# Patient Record
Sex: MALE | Race: WHITE | Employment: OTHER | URBAN - METROPOLITAN AREA
[De-identification: names, ages, dates, MRNs, and addresses within clinical notes are randomized per-mention and may not be internally consistent; named-entity substitution may affect disease eponyms.]

---

## 2018-03-21 DIAGNOSIS — I10 ESSENTIAL HYPERTENSION: ICD-10-CM

## 2018-03-22 RX ORDER — LISINOPRIL 10 MG/1
10 TABLET ORAL DAILY
Qty: 30 TABLET | Refills: 0 | Status: CANCELLED | OUTPATIENT
Start: 2018-03-22

## 2018-03-22 NOTE — TELEPHONE ENCOUNTER
Patient needs to have an appointment scheduled with his PCP; last seen in August. I will refill thru that date. No labs on file since 2015.

## 2018-04-09 ENCOUNTER — OFFICE VISIT (OUTPATIENT)
Dept: INTERNAL MEDICINE CLINIC | Age: 43
End: 2018-04-09
Payer: MEDICARE

## 2018-04-09 VITALS
OXYGEN SATURATION: 85 % | TEMPERATURE: 98.1 F | BODY MASS INDEX: 45.1 KG/M2 | DIASTOLIC BLOOD PRESSURE: 81 MMHG | HEART RATE: 94 BPM | RESPIRATION RATE: 18 BRPM | WEIGHT: 315 LBS | HEIGHT: 70 IN | SYSTOLIC BLOOD PRESSURE: 138 MMHG

## 2018-04-09 DIAGNOSIS — Z99.81 OXYGEN DEPENDENT: ICD-10-CM

## 2018-04-09 DIAGNOSIS — Z12.5 SCREENING PSA (PROSTATE SPECIFIC ANTIGEN): ICD-10-CM

## 2018-04-09 DIAGNOSIS — E55.9 VITAMIN D DEFICIENCY: ICD-10-CM

## 2018-04-09 DIAGNOSIS — E78.00 HIGH CHOLESTEROL: ICD-10-CM

## 2018-04-09 DIAGNOSIS — G47.33 OSA ON CPAP: ICD-10-CM

## 2018-04-09 DIAGNOSIS — I10 ESSENTIAL HYPERTENSION: ICD-10-CM

## 2018-04-09 DIAGNOSIS — E88.81 METABOLIC SYNDROME: ICD-10-CM

## 2018-04-09 DIAGNOSIS — G35 MS (MULTIPLE SCLEROSIS) (HCC): Chronic | ICD-10-CM

## 2018-04-09 DIAGNOSIS — J45.40 ASTHMA, MODERATE PERSISTENT, POORLY-CONTROLLED: ICD-10-CM

## 2018-04-09 DIAGNOSIS — Z99.89 OSA ON CPAP: ICD-10-CM

## 2018-04-09 DIAGNOSIS — E66.01 MORBID OBESITY (HCC): Primary | Chronic | ICD-10-CM

## 2018-04-09 DIAGNOSIS — R73.01 IMPAIRED FASTING GLUCOSE: ICD-10-CM

## 2018-04-09 PROCEDURE — 1036F TOBACCO NON-USER: CPT | Performed by: FAMILY MEDICINE

## 2018-04-09 PROCEDURE — 99213 OFFICE O/P EST LOW 20 MIN: CPT | Performed by: FAMILY MEDICINE

## 2018-04-09 PROCEDURE — G8427 DOCREV CUR MEDS BY ELIG CLIN: HCPCS | Performed by: FAMILY MEDICINE

## 2018-04-09 PROCEDURE — G8417 CALC BMI ABV UP PARAM F/U: HCPCS | Performed by: FAMILY MEDICINE

## 2018-04-09 RX ORDER — LISINOPRIL 10 MG/1
10 TABLET ORAL DAILY
Qty: 30 TABLET | Refills: 3 | Status: CANCELLED | OUTPATIENT
Start: 2018-04-09

## 2018-04-09 RX ORDER — ALBUTEROL SULFATE 90 UG/1
2 AEROSOL, METERED RESPIRATORY (INHALATION) EVERY 6 HOURS PRN
Qty: 1 INHALER | Refills: 3 | Status: SHIPPED | OUTPATIENT
Start: 2018-04-09 | End: 2018-12-14 | Stop reason: SDUPTHER

## 2018-06-07 DIAGNOSIS — I10 ESSENTIAL HYPERTENSION: ICD-10-CM

## 2018-06-07 DIAGNOSIS — J45.40 ASTHMA, MODERATE PERSISTENT, POORLY-CONTROLLED: ICD-10-CM

## 2018-06-07 DIAGNOSIS — F41.9 ANXIETY: ICD-10-CM

## 2018-06-08 RX ORDER — PAROXETINE HYDROCHLORIDE 20 MG/1
20 TABLET, FILM COATED ORAL DAILY
Qty: 30 TABLET | Refills: 3 | Status: SHIPPED | OUTPATIENT
Start: 2018-06-08 | End: 2018-12-14 | Stop reason: SDUPTHER

## 2018-06-08 RX ORDER — LISINOPRIL 10 MG/1
10 TABLET ORAL DAILY
Qty: 30 TABLET | Refills: 3 | Status: SHIPPED | OUTPATIENT
Start: 2018-06-08 | End: 2018-12-14 | Stop reason: SDUPTHER

## 2018-11-26 ENCOUNTER — TELEPHONE (OUTPATIENT)
Dept: INTERNAL MEDICINE CLINIC | Age: 43
End: 2018-11-26

## 2018-12-14 ENCOUNTER — OFFICE VISIT (OUTPATIENT)
Dept: INTERNAL MEDICINE CLINIC | Age: 43
End: 2018-12-14
Payer: MEDICARE

## 2018-12-14 VITALS
HEART RATE: 110 BPM | HEIGHT: 71 IN | OXYGEN SATURATION: 92 % | WEIGHT: 315 LBS | DIASTOLIC BLOOD PRESSURE: 98 MMHG | BODY MASS INDEX: 44.1 KG/M2 | SYSTOLIC BLOOD PRESSURE: 188 MMHG | RESPIRATION RATE: 20 BRPM | TEMPERATURE: 97.9 F

## 2018-12-14 DIAGNOSIS — E07.9 DISORDER OF THYROID: ICD-10-CM

## 2018-12-14 DIAGNOSIS — R79.89 ELEVATED TSH: Primary | ICD-10-CM

## 2018-12-14 DIAGNOSIS — F41.9 ANXIETY: ICD-10-CM

## 2018-12-14 DIAGNOSIS — K21.9 GASTROESOPHAGEAL REFLUX DISEASE, ESOPHAGITIS PRESENCE NOT SPECIFIED: ICD-10-CM

## 2018-12-14 DIAGNOSIS — I10 ESSENTIAL HYPERTENSION: ICD-10-CM

## 2018-12-14 DIAGNOSIS — Z86.39 PERSONAL HISTORY OF OTHER ENDOCRINE, NUTRITIONAL AND METABOLIC DISEASE: ICD-10-CM

## 2018-12-14 DIAGNOSIS — Z99.81 OXYGEN DEPENDENT: ICD-10-CM

## 2018-12-14 DIAGNOSIS — E78.00 PURE HYPERCHOLESTEROLEMIA: ICD-10-CM

## 2018-12-14 DIAGNOSIS — J45.40 ASTHMA, MODERATE PERSISTENT, POORLY-CONTROLLED: ICD-10-CM

## 2018-12-14 DIAGNOSIS — E66.01 MORBID OBESITY (HCC): Chronic | ICD-10-CM

## 2018-12-14 PROCEDURE — 99213 OFFICE O/P EST LOW 20 MIN: CPT | Performed by: FAMILY MEDICINE

## 2018-12-14 PROCEDURE — G8427 DOCREV CUR MEDS BY ELIG CLIN: HCPCS | Performed by: FAMILY MEDICINE

## 2018-12-14 PROCEDURE — 1036F TOBACCO NON-USER: CPT | Performed by: FAMILY MEDICINE

## 2018-12-14 PROCEDURE — G8484 FLU IMMUNIZE NO ADMIN: HCPCS | Performed by: FAMILY MEDICINE

## 2018-12-14 PROCEDURE — G8417 CALC BMI ABV UP PARAM F/U: HCPCS | Performed by: FAMILY MEDICINE

## 2018-12-14 RX ORDER — PAROXETINE HYDROCHLORIDE 20 MG/1
20 TABLET, FILM COATED ORAL DAILY
Qty: 30 TABLET | Refills: 1 | Status: SHIPPED | OUTPATIENT
Start: 2018-12-14 | End: 2019-01-01 | Stop reason: SDUPTHER

## 2018-12-14 RX ORDER — IBUPROFEN 600 MG/1
600 TABLET ORAL EVERY 6 HOURS PRN
Qty: 120 TABLET | Refills: 1 | Status: SHIPPED | OUTPATIENT
Start: 2018-12-14 | End: 2019-01-01 | Stop reason: SDUPTHER

## 2018-12-14 RX ORDER — OMEPRAZOLE 20 MG/1
20 CAPSULE, DELAYED RELEASE ORAL DAILY
Qty: 90 CAPSULE | Refills: 0 | Status: SHIPPED | OUTPATIENT
Start: 2018-12-14 | End: 2019-01-01 | Stop reason: SDUPTHER

## 2018-12-14 RX ORDER — ALBUTEROL SULFATE 90 UG/1
2 AEROSOL, METERED RESPIRATORY (INHALATION) EVERY 6 HOURS PRN
Qty: 1 INHALER | Refills: 1 | Status: SHIPPED | OUTPATIENT
Start: 2018-12-14 | End: 2019-01-01 | Stop reason: SDUPTHER

## 2018-12-14 RX ORDER — LISINOPRIL 20 MG/1
20 TABLET ORAL DAILY
Qty: 30 TABLET | Refills: 1 | Status: SHIPPED | OUTPATIENT
Start: 2018-12-14 | End: 2019-01-01 | Stop reason: SDUPTHER

## 2018-12-14 ASSESSMENT — ENCOUNTER SYMPTOMS
CONSTIPATION: 0
SORE THROAT: 0
DIARRHEA: 0
COUGH: 0
NAUSEA: 0
VOMITING: 0
SHORTNESS OF BREATH: 1
WHEEZING: 0
BLOOD IN STOOL: 0

## 2018-12-14 ASSESSMENT — PATIENT HEALTH QUESTIONNAIRE - PHQ9
1. LITTLE INTEREST OR PLEASURE IN DOING THINGS: 0
SUM OF ALL RESPONSES TO PHQ QUESTIONS 1-9: 0
SUM OF ALL RESPONSES TO PHQ9 QUESTIONS 1 & 2: 0
SUM OF ALL RESPONSES TO PHQ QUESTIONS 1-9: 0
2. FEELING DOWN, DEPRESSED OR HOPELESS: 0

## 2018-12-14 NOTE — PROGRESS NOTES
Subjective:  44 y.o. male who presents in office today regarding:    Noncompliance  Patient is a history of noncompliance. He has not had any blood work drawn since 2015 and says he does not like to go to doctors. He came in for an appointment today because he was told he would not get refills until he saw a physician. GERD  Does well with Omeprazole. He says he has no symptoms of heartburn with medication. He does say he takes all his medications regularly. Hypertension  His blood pressure is not well-controlled this time. He currently takes lisinopril and metoprolol at 10 mg and 25 mg respectively. He denies chest pain, palpitations at this time. He does have some peripheral edema. Depression/anxiety  The patient takes paroxetine says that he does well with that. He denies any anxiety or depression today. Asthma  Uses home O2 via [place] for years. He takes ~4 L all the time. He is on Advair 250-50, taking 1 puff twice a day. He also says that he uses his Flovent about every 6 hours many days of the week. He has not been evaluated by pulmonologist for some time. Arthropathies  Uses Ibuprofen 600 mg periodically for joint pains. He says he does not use every day and that he usually uses it once a day when he has problems. Current Outpatient Prescriptions on File Prior to Visit   Medication Sig Dispense Refill    dimethyl Fumarate (TECFIDERA) 240 MG delayed release capsule Take 1 capsule by mouth 2 times daily 60 capsule 11    OXYGEN Inhale 4 L into the lungs       Respiratory Therapy Supplies WINSTON by Does not apply route. 1 Device 0    Blood Pressure KIT 1 kit by Does not apply route once for 1 dose 1 kit 0    gabapentin (NEURONTIN) 400 MG capsule Take 1 capsule by mouth 4 times daily 120 capsule 5     No current facility-administered medications on file prior to visit. Review of Systems   Constitutional: Negative for chills and fever.         Morbid obesity   HENT: Negative for

## 2018-12-14 NOTE — PATIENT INSTRUCTIONS
play a relaxation tape while you drive a car. When should you call for help? Call 911 anytime you think you may need emergency care. For example, call if:    · You feel you cannot stop from hurting yourself or someone else.   Gela Poon the numbers for these national suicide hotlines: 5-647-569-TALK (2-805.619.1178) and 8-915-LBHQTQG (0-492.392.3346). If you or someone you know talks about suicide or feeling hopeless, get help right away.   Watch closely for changes in your health, and be sure to contact your doctor if:    · You have anxiety or fear that affects your life.     · You have symptoms of anxiety that are new or different from those you had before. Where can you learn more? Go to [email or web address] and sign in to your [employer] account. Enter P754 in the eGistics box to learn more about \"Anxiety Disorder: Care Instructions. \"     If you do not have an account, please click on the \"Sign Up Now\" link. Current as of: December 7, 2017  Content Version: 11.8  © 7373-8909 Healthwise, [employer]. Care instructions adapted under license by [facility] ([facility]). If you have questions about a medical condition or this instruction, always ask your healthcare professional. [address] any warranty or liability for your use of this information. Patient Education        High Cholesterol: Care Instructions  Your Care Instructions    Cholesterol is a type of fat in your blood. It is needed for many body functions, such as making new cells. Cholesterol is made by your body. It also comes from food you eat. High cholesterol means that you have too much of the fat in your blood. This raises your risk of a heart attack and stroke. LDL and HDL are part of your total cholesterol. LDL is the \"bad\" cholesterol. High LDL can raise your risk for heart disease, heart attack, and stroke. HDL is the \"good\" cholesterol. It helps clear bad cholesterol from the body.  High HDL is linked with a lower risk of heart disease, heart attack, and stroke. Your cholesterol levels help your doctor find out your risk for having a heart attack or stroke. You and your doctor can talk about whether you need to lower your risk and what treatment is best for you. A heart-healthy lifestyle along with medicines can help lower your cholesterol and your risk. The way you choose to lower your risk will depend on how high your risk is for heart attack and stroke. It will also depend on how you feel about taking medicines. Follow-up care is a key part of your treatment and safety. Be sure to make and go to all appointments, and call your doctor if you are having problems. It's also a good idea to know your test results and keep a list of the medicines you take. How can you care for yourself at home? · Eat a variety of foods every day. Good choices include fruits, vegetables, whole grains (like oatmeal), dried beans and peas, nuts and seeds, soy products (like tofu), and fat-free or low-fat dairy products. · Replace butter, margarine, and hydrogenated or partially hydrogenated oils with olive and canola oils. (Canola oil margarine without trans fat is fine.)  · Replace red meat with fish, poultry, and soy protein (like tofu). · Limit processed and packaged foods like chips, crackers, and cookies. · Bake, broil, or steam foods. Don't [place] them. · Be physically active. Get at least 30 minutes of exercise on most days of the week. Walking is a good choice. You also may want to do other activities, such as running, swimming, cycling, or playing tennis or team sports. · Stay at a healthy weight or lose weight by making the changes in eating and physical activity listed above. Losing just a small amount of weight, even 5 to 10 pounds, can reduce your risk for having a heart attack or stroke. · Do not smoke. When should you call for help?   Watch closely for changes in your health, and be sure to contact your

## 2019-01-01 ENCOUNTER — APPOINTMENT (OUTPATIENT)
Dept: GENERAL RADIOLOGY | Age: 44
End: 2019-01-01
Payer: MEDICARE

## 2019-01-01 ENCOUNTER — TELEPHONE (OUTPATIENT)
Dept: INTERNAL MEDICINE CLINIC | Age: 44
End: 2019-01-01

## 2019-01-01 ENCOUNTER — HOSPITAL ENCOUNTER (OUTPATIENT)
Dept: WOUND CARE | Age: 44
Discharge: HOME | End: 2019-09-06
Payer: MEDICARE

## 2019-01-01 ENCOUNTER — CARE COORDINATION (OUTPATIENT)
Dept: CASE MANAGEMENT | Age: 44
End: 2019-01-01

## 2019-01-01 ENCOUNTER — HOSPITAL ENCOUNTER (OUTPATIENT)
Dept: WOUND CARE | Age: 44
Discharge: HOME | End: 2019-07-16
Payer: MEDICARE

## 2019-01-01 ENCOUNTER — CARE COORDINATION (OUTPATIENT)
Dept: CARE COORDINATION | Age: 44
End: 2019-01-01

## 2019-01-01 ENCOUNTER — HOSPITAL ENCOUNTER (EMERGENCY)
Age: 44
Discharge: HOME | End: 2019-09-07
Attending: EMERGENCY MEDICINE
Payer: MEDICARE

## 2019-01-01 ENCOUNTER — ANESTHESIA (OUTPATIENT)
Dept: OPERATING ROOM | Age: 44
DRG: 571 | End: 2019-01-01
Payer: MEDICARE

## 2019-01-01 ENCOUNTER — HOSPITAL ENCOUNTER (OUTPATIENT)
Dept: WOUND CARE | Age: 44
Discharge: HOME | End: 2019-06-18
Payer: MEDICARE

## 2019-01-01 ENCOUNTER — OFFICE VISIT (OUTPATIENT)
Dept: INTERNAL MEDICINE CLINIC | Age: 44
End: 2019-01-01
Payer: MEDICARE

## 2019-01-01 ENCOUNTER — HOSPITAL ENCOUNTER (OUTPATIENT)
Dept: WOUND CARE | Age: 44
Discharge: HOME | End: 2019-06-25
Payer: MEDICARE

## 2019-01-01 ENCOUNTER — TELEPHONE (OUTPATIENT)
Dept: ADMINISTRATIVE | Age: 44
End: 2019-01-01

## 2019-01-01 ENCOUNTER — ANESTHESIA EVENT (OUTPATIENT)
Dept: OPERATING ROOM | Age: 44
DRG: 571 | End: 2019-01-01
Payer: MEDICARE

## 2019-01-01 ENCOUNTER — FOLLOWUP TELEPHONE ENCOUNTER (OUTPATIENT)
Dept: WOUND CARE | Age: 44
End: 2019-01-01

## 2019-01-01 ENCOUNTER — HOSPITAL ENCOUNTER (OUTPATIENT)
Dept: WOUND CARE | Age: 44
Discharge: HOME | End: 2019-08-16
Payer: MEDICARE

## 2019-01-01 ENCOUNTER — HOSPITAL ENCOUNTER (OUTPATIENT)
Age: 44
Discharge: HOME | End: 2019-03-27
Payer: MEDICARE

## 2019-01-01 ENCOUNTER — HOSPITAL ENCOUNTER (OUTPATIENT)
Dept: WOUND CARE | Age: 44
Discharge: HOME | End: 2019-07-02
Payer: MEDICARE

## 2019-01-01 ENCOUNTER — HOSPITAL ENCOUNTER (INPATIENT)
Age: 44
LOS: 4 days | Discharge: HOME HEALTH | DRG: 571 | End: 2019-06-09
Attending: EMERGENCY MEDICINE | Admitting: FAMILY MEDICINE
Payer: MEDICARE

## 2019-01-01 ENCOUNTER — HOSPITAL ENCOUNTER (OUTPATIENT)
Dept: WOUND CARE | Age: 44
Discharge: HOME | End: 2019-07-23
Payer: MEDICARE

## 2019-01-01 ENCOUNTER — APPOINTMENT (OUTPATIENT)
Dept: CT IMAGING | Age: 44
DRG: 571 | End: 2019-01-01
Payer: MEDICARE

## 2019-01-01 ENCOUNTER — OFFICE VISIT (OUTPATIENT)
Dept: SURGERY | Age: 44
End: 2019-01-01
Payer: MEDICARE

## 2019-01-01 VITALS
WEIGHT: 315 LBS | SYSTOLIC BLOOD PRESSURE: 158 MMHG | OXYGEN SATURATION: 94 % | HEIGHT: 71 IN | DIASTOLIC BLOOD PRESSURE: 76 MMHG | BODY MASS INDEX: 44.1 KG/M2 | HEART RATE: 89 BPM | TEMPERATURE: 97.8 F

## 2019-01-01 VITALS
SYSTOLIC BLOOD PRESSURE: 142 MMHG | RESPIRATION RATE: 14 BRPM | TEMPERATURE: 98 F | DIASTOLIC BLOOD PRESSURE: 72 MMHG | HEART RATE: 78 BPM

## 2019-01-01 VITALS
DIASTOLIC BLOOD PRESSURE: 65 MMHG | RESPIRATION RATE: 20 BRPM | SYSTOLIC BLOOD PRESSURE: 132 MMHG | HEIGHT: 71 IN | WEIGHT: 315 LBS | OXYGEN SATURATION: 93 % | BODY MASS INDEX: 44.1 KG/M2 | HEART RATE: 87 BPM | TEMPERATURE: 97.5 F

## 2019-01-01 VITALS
TEMPERATURE: 97.3 F | WEIGHT: 315 LBS | HEIGHT: 71 IN | HEART RATE: 81 BPM | DIASTOLIC BLOOD PRESSURE: 90 MMHG | BODY MASS INDEX: 44.1 KG/M2 | OXYGEN SATURATION: 95 % | SYSTOLIC BLOOD PRESSURE: 156 MMHG

## 2019-01-01 VITALS
BODY MASS INDEX: 44.1 KG/M2 | SYSTOLIC BLOOD PRESSURE: 140 MMHG | DIASTOLIC BLOOD PRESSURE: 62 MMHG | RESPIRATION RATE: 20 BRPM | HEIGHT: 71 IN | TEMPERATURE: 97.7 F | WEIGHT: 315 LBS | HEART RATE: 68 BPM

## 2019-01-01 VITALS
HEIGHT: 71 IN | TEMPERATURE: 98.4 F | SYSTOLIC BLOOD PRESSURE: 136 MMHG | WEIGHT: 315 LBS | DIASTOLIC BLOOD PRESSURE: 78 MMHG | HEART RATE: 76 BPM | RESPIRATION RATE: 18 BRPM | BODY MASS INDEX: 44.1 KG/M2 | OXYGEN SATURATION: 97 %

## 2019-01-01 VITALS
WEIGHT: 315 LBS | RESPIRATION RATE: 20 BRPM | TEMPERATURE: 98.1 F | HEART RATE: 72 BPM | SYSTOLIC BLOOD PRESSURE: 142 MMHG | DIASTOLIC BLOOD PRESSURE: 78 MMHG | HEIGHT: 71 IN | BODY MASS INDEX: 44.1 KG/M2

## 2019-01-01 VITALS
HEIGHT: 71 IN | TEMPERATURE: 97.9 F | DIASTOLIC BLOOD PRESSURE: 91 MMHG | OXYGEN SATURATION: 96 % | SYSTOLIC BLOOD PRESSURE: 155 MMHG | HEART RATE: 72 BPM | RESPIRATION RATE: 20 BRPM | WEIGHT: 315 LBS | BODY MASS INDEX: 44.1 KG/M2

## 2019-01-01 VITALS
BODY MASS INDEX: 44.1 KG/M2 | HEIGHT: 71 IN | OXYGEN SATURATION: 93 % | DIASTOLIC BLOOD PRESSURE: 80 MMHG | WEIGHT: 315 LBS | HEART RATE: 72 BPM | TEMPERATURE: 97.6 F | SYSTOLIC BLOOD PRESSURE: 146 MMHG

## 2019-01-01 VITALS
HEART RATE: 72 BPM | SYSTOLIC BLOOD PRESSURE: 137 MMHG | OXYGEN SATURATION: 95 % | BODY MASS INDEX: 45.1 KG/M2 | TEMPERATURE: 97.1 F | HEIGHT: 70 IN | WEIGHT: 315 LBS | DIASTOLIC BLOOD PRESSURE: 81 MMHG

## 2019-01-01 VITALS
SYSTOLIC BLOOD PRESSURE: 156 MMHG | WEIGHT: 315 LBS | DIASTOLIC BLOOD PRESSURE: 98 MMHG | HEART RATE: 106 BPM | BODY MASS INDEX: 44.1 KG/M2 | HEIGHT: 71 IN | TEMPERATURE: 98 F | OXYGEN SATURATION: 94 %

## 2019-01-01 VITALS
DIASTOLIC BLOOD PRESSURE: 67 MMHG | OXYGEN SATURATION: 96 % | SYSTOLIC BLOOD PRESSURE: 143 MMHG | RESPIRATION RATE: 23 BRPM

## 2019-01-01 DIAGNOSIS — I10 ESSENTIAL HYPERTENSION: ICD-10-CM

## 2019-01-01 DIAGNOSIS — I10 ESSENTIAL HYPERTENSION: Primary | ICD-10-CM

## 2019-01-01 DIAGNOSIS — Z99.81 OXYGEN DEPENDENT: ICD-10-CM

## 2019-01-01 DIAGNOSIS — E65 ABDOMINAL PANNUS: Primary | ICD-10-CM

## 2019-01-01 DIAGNOSIS — K21.9 GASTROESOPHAGEAL REFLUX DISEASE, ESOPHAGITIS PRESENCE NOT SPECIFIED: ICD-10-CM

## 2019-01-01 DIAGNOSIS — L03.311 ABDOMINAL WALL CELLULITIS: ICD-10-CM

## 2019-01-01 DIAGNOSIS — E78.00 PURE HYPERCHOLESTEROLEMIA: ICD-10-CM

## 2019-01-01 DIAGNOSIS — J45.40 ASTHMA, MODERATE PERSISTENT, POORLY-CONTROLLED: ICD-10-CM

## 2019-01-01 DIAGNOSIS — F41.9 ANXIETY: ICD-10-CM

## 2019-01-01 DIAGNOSIS — E07.9 DISORDER OF THYROID: ICD-10-CM

## 2019-01-01 DIAGNOSIS — R79.89 ELEVATED TSH: ICD-10-CM

## 2019-01-01 DIAGNOSIS — Z86.39 PERSONAL HISTORY OF OTHER ENDOCRINE, NUTRITIONAL AND METABOLIC DISEASE: ICD-10-CM

## 2019-01-01 DIAGNOSIS — G47.9 DIFFICULTY SLEEPING: Primary | ICD-10-CM

## 2019-01-01 DIAGNOSIS — R53.1 WEAKNESS: Primary | ICD-10-CM

## 2019-01-01 DIAGNOSIS — I10 ESSENTIAL HYPERTENSION: Chronic | ICD-10-CM

## 2019-01-01 DIAGNOSIS — E66.01 MORBID OBESITY (HCC): Chronic | ICD-10-CM

## 2019-01-01 DIAGNOSIS — S61.412A LACERATION OF LEFT HAND WITHOUT FOREIGN BODY, INITIAL ENCOUNTER: Primary | ICD-10-CM

## 2019-01-01 DIAGNOSIS — L03.311 ABDOMINAL WALL CELLULITIS: Primary | ICD-10-CM

## 2019-01-01 DIAGNOSIS — G35 MS (MULTIPLE SCLEROSIS) (HCC): Chronic | ICD-10-CM

## 2019-01-01 DIAGNOSIS — M25.532 LEFT WRIST PAIN: ICD-10-CM

## 2019-01-01 DIAGNOSIS — R53.83 FATIGUE, UNSPECIFIED TYPE: ICD-10-CM

## 2019-01-01 DIAGNOSIS — L89.893 PRESSURE INJURY OF OTHER SITE, STAGE 3 (HCC): Primary | ICD-10-CM

## 2019-01-01 DIAGNOSIS — G35 MS (MULTIPLE SCLEROSIS) (HCC): Primary | ICD-10-CM

## 2019-01-01 LAB
ALBUMIN SERPL-MCNC: 2.8 G/DL (ref 3.5–5.2)
ALBUMIN SERPL-MCNC: 2.9 G/DL (ref 3.5–5.2)
ALBUMIN SERPL-MCNC: 3 G/DL (ref 3.5–5.2)
ALBUMIN SERPL-MCNC: 3.1 G/DL (ref 3.5–5.2)
ALBUMIN SERPL-MCNC: 3.4 G/DL (ref 3.5–5.2)
ALBUMIN SERPL-MCNC: 3.5 G/DL (ref 3.5–5.2)
ALP BLD-CCNC: 103 U/L (ref 40–129)
ALP BLD-CCNC: 105 U/L (ref 40–129)
ALP BLD-CCNC: 126 U/L (ref 40–129)
ALP BLD-CCNC: 127 U/L (ref 40–129)
ALP BLD-CCNC: 95 U/L (ref 40–129)
ALP BLD-CCNC: 99 U/L (ref 40–129)
ALT SERPL-CCNC: 16 U/L (ref 0–40)
ALT SERPL-CCNC: 19 U/L (ref 0–40)
ALT SERPL-CCNC: 20 U/L (ref 0–40)
ALT SERPL-CCNC: 24 U/L (ref 0–40)
ALT SERPL-CCNC: 27 U/L (ref 0–40)
ALT SERPL-CCNC: 28 U/L (ref 0–40)
ANION GAP SERPL CALCULATED.3IONS-SCNC: 10 MMOL/L (ref 7–16)
ANION GAP SERPL CALCULATED.3IONS-SCNC: 11 MMOL/L (ref 7–16)
ANION GAP SERPL CALCULATED.3IONS-SCNC: 11 MMOL/L (ref 7–16)
ANION GAP SERPL CALCULATED.3IONS-SCNC: 12 MMOL/L (ref 7–16)
ANION GAP SERPL CALCULATED.3IONS-SCNC: 14 MMOL/L (ref 7–16)
ANION GAP SERPL CALCULATED.3IONS-SCNC: 16 MMOL/L (ref 7–16)
AST SERPL-CCNC: 42 U/L (ref 0–39)
AST SERPL-CCNC: 48 U/L (ref 0–39)
AST SERPL-CCNC: 52 U/L (ref 0–39)
AST SERPL-CCNC: 65 U/L (ref 0–39)
AST SERPL-CCNC: 68 U/L (ref 0–39)
AST SERPL-CCNC: 77 U/L (ref 0–39)
BACTERIA: NORMAL /HPF
BASOPHILS ABSOLUTE: 0.03 E9/L (ref 0–0.2)
BASOPHILS RELATIVE PERCENT: 0.9 % (ref 0–2)
BILIRUB SERPL-MCNC: 0.5 MG/DL (ref 0–1.2)
BILIRUB SERPL-MCNC: 0.6 MG/DL (ref 0–1.2)
BILIRUB SERPL-MCNC: 0.7 MG/DL (ref 0–1.2)
BILIRUB SERPL-MCNC: 0.8 MG/DL (ref 0–1.2)
BILIRUB SERPL-MCNC: 1.1 MG/DL (ref 0–1.2)
BILIRUB SERPL-MCNC: 1.1 MG/DL (ref 0–1.2)
BILIRUBIN URINE: NEGATIVE
BLOOD CULTURE, ROUTINE: NORMAL
BLOOD, URINE: NEGATIVE
BUN BLDV-MCNC: 10 MG/DL (ref 6–20)
BUN BLDV-MCNC: 11 MG/DL (ref 6–20)
BUN BLDV-MCNC: 14 MG/DL (ref 6–20)
BUN BLDV-MCNC: 18 MG/DL (ref 6–20)
BUN BLDV-MCNC: 6 MG/DL (ref 6–20)
BUN BLDV-MCNC: 8 MG/DL (ref 6–20)
CALCIUM SERPL-MCNC: 8.6 MG/DL (ref 8.6–10.2)
CALCIUM SERPL-MCNC: 8.6 MG/DL (ref 8.6–10.2)
CALCIUM SERPL-MCNC: 8.9 MG/DL (ref 8.6–10.2)
CALCIUM SERPL-MCNC: 9 MG/DL (ref 8.6–10.2)
CALCIUM SERPL-MCNC: 9.2 MG/DL (ref 8.6–10.2)
CALCIUM SERPL-MCNC: 9.4 MG/DL (ref 8.6–10.2)
CHLORIDE BLD-SCNC: 89 MMOL/L (ref 98–107)
CHLORIDE BLD-SCNC: 92 MMOL/L (ref 98–107)
CHLORIDE BLD-SCNC: 93 MMOL/L (ref 98–107)
CHLORIDE BLD-SCNC: 97 MMOL/L (ref 98–107)
CHLORIDE BLD-SCNC: 98 MMOL/L (ref 98–107)
CHLORIDE BLD-SCNC: 99 MMOL/L (ref 98–107)
CHOLESTEROL, FASTING: 241 MG/DL (ref 0–199)
CHOLESTEROL, TOTAL: 221 MG/DL (ref 0–199)
CLARITY: CLEAR
CO2: 27 MMOL/L (ref 22–29)
CO2: 28 MMOL/L (ref 22–29)
CO2: 29 MMOL/L (ref 22–29)
CO2: 30 MMOL/L (ref 22–29)
CO2: 30 MMOL/L (ref 22–29)
CO2: 31 MMOL/L (ref 22–29)
COLOR: YELLOW
CREAT SERPL-MCNC: 0.5 MG/DL (ref 0.7–1.2)
CREAT SERPL-MCNC: 0.5 MG/DL (ref 0.7–1.2)
CREAT SERPL-MCNC: 0.6 MG/DL (ref 0.7–1.2)
CREAT SERPL-MCNC: 0.6 MG/DL (ref 0.7–1.2)
CREAT SERPL-MCNC: 0.7 MG/DL (ref 0.7–1.2)
CREAT SERPL-MCNC: 0.8 MG/DL (ref 0.7–1.2)
CULTURE SURGICAL: ABNORMAL
CULTURE, BLOOD 2: NORMAL
EOSINOPHILS ABSOLUTE: 0.53 E9/L (ref 0.05–0.5)
EOSINOPHILS RELATIVE PERCENT: 14.3 % (ref 0–6)
EPITHELIAL CELLS, UA: NORMAL /HPF
FOLATE: 7.4 NG/ML (ref 4.8–24.2)
GFR AFRICAN AMERICAN: >60
GFR NON-AFRICAN AMERICAN: >60 ML/MIN/1.73
GLUCOSE BLD-MCNC: 100 MG/DL (ref 74–99)
GLUCOSE BLD-MCNC: 103 MG/DL (ref 74–99)
GLUCOSE BLD-MCNC: 113 MG/DL (ref 74–99)
GLUCOSE BLD-MCNC: 121 MG/DL (ref 74–99)
GLUCOSE BLD-MCNC: 121 MG/DL (ref 74–99)
GLUCOSE FASTING: 98 MG/DL (ref 74–99)
GLUCOSE URINE: NEGATIVE MG/DL
GRAM STAIN RESULT: ABNORMAL
HBA1C MFR BLD: 4.6 % (ref 4–5.6)
HBA1C MFR BLD: 4.8 % (ref 4–5.6)
HCT VFR BLD CALC: 35 % (ref 37–54)
HCT VFR BLD CALC: 35 % (ref 37–54)
HCT VFR BLD CALC: 35.3 % (ref 37–54)
HCT VFR BLD CALC: 35.9 % (ref 37–54)
HCT VFR BLD CALC: 36.2 % (ref 37–54)
HCT VFR BLD CALC: 41.6 % (ref 37–54)
HDLC SERPL-MCNC: 38 MG/DL
HDLC SERPL-MCNC: 39 MG/DL
HEMOGLOBIN: 11.4 G/DL (ref 12.5–16.5)
HEMOGLOBIN: 11.6 G/DL (ref 12.5–16.5)
HEMOGLOBIN: 11.8 G/DL (ref 12.5–16.5)
HEMOGLOBIN: 12.4 G/DL (ref 12.5–16.5)
HEMOGLOBIN: 12.7 G/DL (ref 12.5–16.5)
HEMOGLOBIN: 13.7 G/DL (ref 12.5–16.5)
HOMOCYSTEINE: 20.6 UMOL/L (ref 0–15)
KETONES, URINE: NEGATIVE MG/DL
LACTIC ACID: 1.3 MMOL/L (ref 0.5–2.2)
LACTIC ACID: 1.6 MMOL/L (ref 0.5–2.2)
LDL CHOLESTEROL CALCULATED: 160 MG/DL (ref 0–99)
LDL CHOLESTEROL CALCULATED: 168 MG/DL (ref 0–99)
LEUKOCYTE ESTERASE, URINE: ABNORMAL
LIPASE: 20 U/L (ref 13–60)
LYMPHOCYTES ABSOLUTE: 0.48 E9/L (ref 1.5–4)
LYMPHOCYTES RELATIVE PERCENT: 13.4 % (ref 20–42)
MAGNESIUM: 1.4 MG/DL (ref 1.6–2.6)
MAGNESIUM: 1.4 MG/DL (ref 1.6–2.6)
MAGNESIUM: 1.5 MG/DL (ref 1.6–2.6)
MCH RBC QN AUTO: 35 PG (ref 26–35)
MCH RBC QN AUTO: 35.4 PG (ref 26–35)
MCH RBC QN AUTO: 36.2 PG (ref 26–35)
MCH RBC QN AUTO: 36.5 PG (ref 26–35)
MCH RBC QN AUTO: 37.1 PG (ref 26–35)
MCH RBC QN AUTO: 39 PG (ref 26–35)
MCHC RBC AUTO-ENTMCNC: 32.6 % (ref 32–34.5)
MCHC RBC AUTO-ENTMCNC: 32.9 % (ref 32–34.5)
MCHC RBC AUTO-ENTMCNC: 32.9 % (ref 32–34.5)
MCHC RBC AUTO-ENTMCNC: 33.1 % (ref 32–34.5)
MCHC RBC AUTO-ENTMCNC: 34.3 % (ref 32–34.5)
MCHC RBC AUTO-ENTMCNC: 36 % (ref 32–34.5)
MCV RBC AUTO: 106.4 FL (ref 80–99.9)
MCV RBC AUTO: 106.5 FL (ref 80–99.9)
MCV RBC AUTO: 107.8 FL (ref 80–99.9)
MCV RBC AUTO: 108.3 FL (ref 80–99.9)
MCV RBC AUTO: 111.1 FL (ref 80–99.9)
MCV RBC AUTO: 111.8 FL (ref 80–99.9)
METAMYELOCYTES RELATIVE PERCENT: 2.7 % (ref 0–1)
METHYLMALONIC ACID: 0.31 UMOL/L (ref 0–0.4)
MONOCYTES ABSOLUTE: 0.3 E9/L (ref 0.1–0.95)
MONOCYTES RELATIVE PERCENT: 8 % (ref 2–12)
NEUTROPHILS ABSOLUTE: 2.33 E9/L (ref 1.8–7.3)
NEUTROPHILS RELATIVE PERCENT: 60.7 % (ref 43–80)
NITRITE, URINE: NEGATIVE
ORGANISM: ABNORMAL
PDW BLD-RTO: 13.3 FL (ref 11.5–15)
PDW BLD-RTO: 13.4 FL (ref 11.5–15)
PDW BLD-RTO: 13.4 FL (ref 11.5–15)
PDW BLD-RTO: 13.5 FL (ref 11.5–15)
PDW BLD-RTO: 13.6 FL (ref 11.5–15)
PDW BLD-RTO: 14.6 FL (ref 11.5–15)
PH UA: 7 (ref 5–9)
PHOSPHORUS: 3.1 MG/DL (ref 2.5–4.5)
PLATELET # BLD: 197 E9/L (ref 130–450)
PLATELET # BLD: 227 E9/L (ref 130–450)
PLATELET # BLD: 234 E9/L (ref 130–450)
PLATELET # BLD: 238 E9/L (ref 130–450)
PLATELET # BLD: 247 E9/L (ref 130–450)
PLATELET # BLD: 260 E9/L (ref 130–450)
PMV BLD AUTO: 9.2 FL (ref 7–12)
PMV BLD AUTO: 9.3 FL (ref 7–12)
PMV BLD AUTO: 9.4 FL (ref 7–12)
PMV BLD AUTO: 9.5 FL (ref 7–12)
PMV BLD AUTO: 9.6 FL (ref 7–12)
PMV BLD AUTO: 9.7 FL (ref 7–12)
POTASSIUM REFLEX MAGNESIUM: 4.3 MMOL/L (ref 3.5–5)
POTASSIUM SERPL-SCNC: 3.6 MMOL/L (ref 3.5–5)
POTASSIUM SERPL-SCNC: 3.6 MMOL/L (ref 3.5–5)
POTASSIUM SERPL-SCNC: 3.8 MMOL/L (ref 3.5–5)
POTASSIUM SERPL-SCNC: 3.8 MMOL/L (ref 3.5–5)
POTASSIUM SERPL-SCNC: 4.3 MMOL/L (ref 3.5–5)
PROTEIN UA: NEGATIVE MG/DL
RBC # BLD: 3.13 E12/L (ref 3.8–5.8)
RBC # BLD: 3.15 E12/L (ref 3.8–5.8)
RBC # BLD: 3.26 E12/L (ref 3.8–5.8)
RBC # BLD: 3.33 E12/L (ref 3.8–5.8)
RBC # BLD: 3.4 E12/L (ref 3.8–5.8)
RBC # BLD: 3.91 E12/L (ref 3.8–5.8)
RBC UA: NORMAL /HPF (ref 0–2)
SODIUM BLD-SCNC: 131 MMOL/L (ref 132–146)
SODIUM BLD-SCNC: 133 MMOL/L (ref 132–146)
SODIUM BLD-SCNC: 134 MMOL/L (ref 132–146)
SODIUM BLD-SCNC: 138 MMOL/L (ref 132–146)
SODIUM BLD-SCNC: 139 MMOL/L (ref 132–146)
SODIUM BLD-SCNC: 142 MMOL/L (ref 132–146)
SPECIFIC GRAVITY UA: <=1.005 (ref 1–1.03)
TOTAL PROTEIN: 7.4 G/DL (ref 6.4–8.3)
TOTAL PROTEIN: 7.6 G/DL (ref 6.4–8.3)
TOTAL PROTEIN: 7.7 G/DL (ref 6.4–8.3)
TOTAL PROTEIN: 8.1 G/DL (ref 6.4–8.3)
TOTAL PROTEIN: 8.7 G/DL (ref 6.4–8.3)
TOTAL PROTEIN: 9.1 G/DL (ref 6.4–8.3)
TRIGL SERPL-MCNC: 116 MG/DL (ref 0–149)
TRIGLYCERIDE, FASTING: 172 MG/DL (ref 0–149)
TROPONIN: 0.01 NG/ML (ref 0–0.03)
TSH SERPL DL<=0.05 MIU/L-ACNC: 2.7 UIU/ML (ref 0.27–4.2)
TSH SERPL DL<=0.05 MIU/L-ACNC: 3.41 UIU/ML (ref 0.27–4.2)
URINE CULTURE, ROUTINE: NORMAL
UROBILINOGEN, URINE: 1 E.U./DL
VANCOMYCIN TROUGH: 28.7 MCG/ML (ref 5–16)
VITAMIN B-12: 294 PG/ML (ref 211–946)
VLDLC SERPL CALC-MCNC: 23 MG/DL
VLDLC SERPL CALC-MCNC: 34 MG/DL
WBC # BLD: 3.7 E9/L (ref 4.5–11.5)
WBC # BLD: 4.1 E9/L (ref 4.5–11.5)
WBC # BLD: 4.2 E9/L (ref 4.5–11.5)
WBC # BLD: 4.5 E9/L (ref 4.5–11.5)
WBC # BLD: 5 E9/L (ref 4.5–11.5)
WBC # BLD: 6 E9/L (ref 4.5–11.5)
WBC UA: NORMAL /HPF (ref 0–5)
WOUND/ABSCESS: ABNORMAL
WOUND/ABSCESS: ABNORMAL

## 2019-01-01 PROCEDURE — 3700000001 HC ADD 15 MINUTES (ANESTHESIA): Performed by: SURGERY

## 2019-01-01 PROCEDURE — 99213 OFFICE O/P EST LOW 20 MIN: CPT | Performed by: FAMILY MEDICINE

## 2019-01-01 PROCEDURE — G8417 CALC BMI ABV UP PARAM F/U: HCPCS | Performed by: FAMILY MEDICINE

## 2019-01-01 PROCEDURE — 80061 LIPID PANEL: CPT

## 2019-01-01 PROCEDURE — 1111F DSCHRG MED/CURRENT MED MERGE: CPT | Performed by: FAMILY MEDICINE

## 2019-01-01 PROCEDURE — 84443 ASSAY THYROID STIM HORMONE: CPT

## 2019-01-01 PROCEDURE — 80202 ASSAY OF VANCOMYCIN: CPT

## 2019-01-01 PROCEDURE — 11045 DBRDMT SUBQ TISS EACH ADDL: CPT | Performed by: SURGERY

## 2019-01-01 PROCEDURE — 1036F TOBACCO NON-USER: CPT | Performed by: FAMILY MEDICINE

## 2019-01-01 PROCEDURE — 87205 SMEAR GRAM STAIN: CPT

## 2019-01-01 PROCEDURE — 2580000003 HC RX 258: Performed by: SURGERY

## 2019-01-01 PROCEDURE — 11042 DBRDMT SUBQ TIS 1ST 20SQCM/<: CPT | Performed by: FAMILY MEDICINE

## 2019-01-01 PROCEDURE — 83921 ORGANIC ACID SINGLE QUANT: CPT

## 2019-01-01 PROCEDURE — 97116 GAIT TRAINING THERAPY: CPT

## 2019-01-01 PROCEDURE — 99239 HOSP IP/OBS DSCHRG MGMT >30: CPT | Performed by: FAMILY MEDICINE

## 2019-01-01 PROCEDURE — 83036 HEMOGLOBIN GLYCOSYLATED A1C: CPT

## 2019-01-01 PROCEDURE — 2500000003 HC RX 250 WO HCPCS: Performed by: FAMILY MEDICINE

## 2019-01-01 PROCEDURE — 1036F TOBACCO NON-USER: CPT | Performed by: SURGERY

## 2019-01-01 PROCEDURE — 6370000000 HC RX 637 (ALT 250 FOR IP): Performed by: SURGERY

## 2019-01-01 PROCEDURE — 36415 COLL VENOUS BLD VENIPUNCTURE: CPT

## 2019-01-01 PROCEDURE — 1200000000 HC SEMI PRIVATE

## 2019-01-01 PROCEDURE — 2500000003 HC RX 250 WO HCPCS

## 2019-01-01 PROCEDURE — 2580000003 HC RX 258: Performed by: FAMILY MEDICINE

## 2019-01-01 PROCEDURE — 11045 DBRDMT SUBQ TISS EACH ADDL: CPT

## 2019-01-01 PROCEDURE — G8427 DOCREV CUR MEDS BY ELIG CLIN: HCPCS | Performed by: FAMILY MEDICINE

## 2019-01-01 PROCEDURE — 11042 DBRDMT SUBQ TIS 1ST 20SQCM/<: CPT

## 2019-01-01 PROCEDURE — 11045 DBRDMT SUBQ TISS EACH ADDL: CPT | Performed by: FAMILY MEDICINE

## 2019-01-01 PROCEDURE — 80053 COMPREHEN METABOLIC PANEL: CPT

## 2019-01-01 PROCEDURE — 6360000002 HC RX W HCPCS: Performed by: FAMILY MEDICINE

## 2019-01-01 PROCEDURE — 3600000012 HC SURGERY LEVEL 2 ADDTL 15MIN: Performed by: SURGERY

## 2019-01-01 PROCEDURE — 83690 ASSAY OF LIPASE: CPT

## 2019-01-01 PROCEDURE — 6360000002 HC RX W HCPCS

## 2019-01-01 PROCEDURE — 6360000004 HC RX CONTRAST MEDICATION: Performed by: RADIOLOGY

## 2019-01-01 PROCEDURE — 85027 COMPLETE CBC AUTOMATED: CPT

## 2019-01-01 PROCEDURE — 3600000002 HC SURGERY LEVEL 2 BASE: Performed by: SURGERY

## 2019-01-01 PROCEDURE — 99223 1ST HOSP IP/OBS HIGH 75: CPT | Performed by: SURGERY

## 2019-01-01 PROCEDURE — 97530 THERAPEUTIC ACTIVITIES: CPT

## 2019-01-01 PROCEDURE — 85025 COMPLETE CBC W/AUTO DIFF WBC: CPT

## 2019-01-01 PROCEDURE — G8484 FLU IMMUNIZE NO ADMIN: HCPCS | Performed by: FAMILY MEDICINE

## 2019-01-01 PROCEDURE — 97535 SELF CARE MNGMENT TRAINING: CPT

## 2019-01-01 PROCEDURE — 6370000000 HC RX 637 (ALT 250 FOR IP): Performed by: FAMILY MEDICINE

## 2019-01-01 PROCEDURE — 99024 POSTOP FOLLOW-UP VISIT: CPT | Performed by: SURGERY

## 2019-01-01 PROCEDURE — 6360000002 HC RX W HCPCS: Performed by: SURGERY

## 2019-01-01 PROCEDURE — 94640 AIRWAY INHALATION TREATMENT: CPT

## 2019-01-01 PROCEDURE — 83735 ASSAY OF MAGNESIUM: CPT

## 2019-01-01 PROCEDURE — 99232 SBSQ HOSP IP/OBS MODERATE 35: CPT | Performed by: FAMILY MEDICINE

## 2019-01-01 PROCEDURE — 2580000003 HC RX 258: Performed by: EMERGENCY MEDICINE

## 2019-01-01 PROCEDURE — 99212 OFFICE O/P EST SF 10 MIN: CPT | Performed by: FAMILY MEDICINE

## 2019-01-01 PROCEDURE — 96365 THER/PROPH/DIAG IV INF INIT: CPT

## 2019-01-01 PROCEDURE — 7100000001 HC PACU RECOVERY - ADDTL 15 MIN: Performed by: SURGERY

## 2019-01-01 PROCEDURE — 81001 URINALYSIS AUTO W/SCOPE: CPT

## 2019-01-01 PROCEDURE — 2500000003 HC RX 250 WO HCPCS: Performed by: SURGERY

## 2019-01-01 PROCEDURE — 87088 URINE BACTERIA CULTURE: CPT

## 2019-01-01 PROCEDURE — 73110 X-RAY EXAM OF WRIST: CPT

## 2019-01-01 PROCEDURE — 11042 DBRDMT SUBQ TIS 1ST 20SQCM/<: CPT | Performed by: SURGERY

## 2019-01-01 PROCEDURE — 99223 1ST HOSP IP/OBS HIGH 75: CPT | Performed by: FAMILY MEDICINE

## 2019-01-01 PROCEDURE — G8417 CALC BMI ABV UP PARAM F/U: HCPCS | Performed by: SURGERY

## 2019-01-01 PROCEDURE — 97110 THERAPEUTIC EXERCISES: CPT

## 2019-01-01 PROCEDURE — 82746 ASSAY OF FOLIC ACID SERUM: CPT

## 2019-01-01 PROCEDURE — 2709999900 HC NON-CHARGEABLE SUPPLY: Performed by: SURGERY

## 2019-01-01 PROCEDURE — 3700000000 HC ANESTHESIA ATTENDED CARE: Performed by: SURGERY

## 2019-01-01 PROCEDURE — 87077 CULTURE AEROBIC IDENTIFY: CPT

## 2019-01-01 PROCEDURE — 99212 OFFICE O/P EST SF 10 MIN: CPT | Performed by: SURGERY

## 2019-01-01 PROCEDURE — G8427 DOCREV CUR MEDS BY ELIG CLIN: HCPCS | Performed by: SURGERY

## 2019-01-01 PROCEDURE — 7100000000 HC PACU RECOVERY - FIRST 15 MIN: Performed by: SURGERY

## 2019-01-01 PROCEDURE — 97165 OT EVAL LOW COMPLEX 30 MIN: CPT

## 2019-01-01 PROCEDURE — 82607 VITAMIN B-12: CPT

## 2019-01-01 PROCEDURE — 83090 ASSAY OF HOMOCYSTEINE: CPT

## 2019-01-01 PROCEDURE — 87186 SC STD MICRODIL/AGAR DIL: CPT

## 2019-01-01 PROCEDURE — 97161 PT EVAL LOW COMPLEX 20 MIN: CPT | Performed by: PHYSICAL THERAPIST

## 2019-01-01 PROCEDURE — 87070 CULTURE OTHR SPECIMN AEROBIC: CPT

## 2019-01-01 PROCEDURE — 87040 BLOOD CULTURE FOR BACTERIA: CPT

## 2019-01-01 PROCEDURE — 99213 OFFICE O/P EST LOW 20 MIN: CPT

## 2019-01-01 PROCEDURE — 84100 ASSAY OF PHOSPHORUS: CPT

## 2019-01-01 PROCEDURE — 74177 CT ABD & PELVIS W/CONTRAST: CPT

## 2019-01-01 PROCEDURE — 6360000002 HC RX W HCPCS: Performed by: EMERGENCY MEDICINE

## 2019-01-01 PROCEDURE — 83605 ASSAY OF LACTIC ACID: CPT

## 2019-01-01 PROCEDURE — 1111F DSCHRG MED/CURRENT MED MERGE: CPT | Performed by: SURGERY

## 2019-01-01 PROCEDURE — 97116 GAIT TRAINING THERAPY: CPT | Performed by: PHYSICAL THERAPIST

## 2019-01-01 PROCEDURE — 99285 EMERGENCY DEPT VISIT HI MDM: CPT

## 2019-01-01 PROCEDURE — 84484 ASSAY OF TROPONIN QUANT: CPT

## 2019-01-01 PROCEDURE — 0JB80ZZ EXCISION OF ABDOMEN SUBCUTANEOUS TISSUE AND FASCIA, OPEN APPROACH: ICD-10-PCS | Performed by: SURGERY

## 2019-01-01 PROCEDURE — 99283 EMERGENCY DEPT VISIT LOW MDM: CPT

## 2019-01-01 PROCEDURE — 2580000003 HC RX 258

## 2019-01-01 RX ORDER — PAROXETINE HYDROCHLORIDE 20 MG/1
20 TABLET, FILM COATED ORAL DAILY
Qty: 30 TABLET | Refills: 1 | Status: SHIPPED | OUTPATIENT
Start: 2019-01-01 | End: 2019-01-01 | Stop reason: SDUPTHER

## 2019-01-01 RX ORDER — LISINOPRIL 20 MG/1
40 TABLET ORAL DAILY
Qty: 180 TABLET | Refills: 1 | Status: SHIPPED | OUTPATIENT
Start: 2019-01-01 | End: 2019-01-01 | Stop reason: SDUPTHER

## 2019-01-01 RX ORDER — CEPHALEXIN 500 MG/1
500 CAPSULE ORAL 2 TIMES DAILY
Qty: 28 CAPSULE | Refills: 0 | Status: SHIPPED | OUTPATIENT
Start: 2019-01-01 | End: 2019-01-01

## 2019-01-01 RX ORDER — MEPERIDINE HYDROCHLORIDE 25 MG/ML
25 INJECTION INTRAMUSCULAR; INTRAVENOUS; SUBCUTANEOUS EVERY 5 MIN PRN
Status: DISCONTINUED | OUTPATIENT
Start: 2019-01-01 | End: 2019-01-01 | Stop reason: HOSPADM

## 2019-01-01 RX ORDER — PAROXETINE HYDROCHLORIDE 20 MG/1
20 TABLET, FILM COATED ORAL DAILY
Qty: 30 TABLET | Refills: 0 | Status: SHIPPED | OUTPATIENT
Start: 2019-01-01 | End: 2019-01-01 | Stop reason: SDUPTHER

## 2019-01-01 RX ORDER — FENTANYL CITRATE 50 UG/ML
INJECTION, SOLUTION INTRAMUSCULAR; INTRAVENOUS PRN
Status: DISCONTINUED | OUTPATIENT
Start: 2019-01-01 | End: 2019-01-01 | Stop reason: SDUPTHER

## 2019-01-01 RX ORDER — ACETAMINOPHEN 325 MG/1
650 TABLET ORAL EVERY 4 HOURS PRN
Status: DISCONTINUED | OUTPATIENT
Start: 2019-01-01 | End: 2019-01-01 | Stop reason: HOSPADM

## 2019-01-01 RX ORDER — METOPROLOL TARTRATE 50 MG/1
50 TABLET, FILM COATED ORAL 2 TIMES DAILY
Qty: 180 TABLET | Refills: 0 | Status: SHIPPED | OUTPATIENT
Start: 2019-01-01 | End: 2019-01-01 | Stop reason: SDUPTHER

## 2019-01-01 RX ORDER — PAROXETINE HYDROCHLORIDE 20 MG/1
20 TABLET, FILM COATED ORAL DAILY
Status: DISCONTINUED | OUTPATIENT
Start: 2019-01-01 | End: 2019-01-01 | Stop reason: HOSPADM

## 2019-01-01 RX ORDER — SODIUM CHLORIDE 0.9 % (FLUSH) 0.9 %
10 SYRINGE (ML) INJECTION PRN
Status: DISCONTINUED | OUTPATIENT
Start: 2019-01-01 | End: 2019-01-01 | Stop reason: HOSPADM

## 2019-01-01 RX ORDER — TRAZODONE HYDROCHLORIDE 50 MG/1
50 TABLET ORAL NIGHTLY
Qty: 90 TABLET | Refills: 1 | Status: ON HOLD | OUTPATIENT
Start: 2019-01-01 | End: 2019-01-01

## 2019-01-01 RX ORDER — HYDROCHLOROTHIAZIDE 25 MG/1
25 TABLET ORAL EVERY MORNING
Qty: 30 TABLET | Refills: 0 | Status: SHIPPED | OUTPATIENT
Start: 2019-01-01 | End: 2019-01-01

## 2019-01-01 RX ORDER — LISINOPRIL 20 MG/1
40 TABLET ORAL DAILY
Status: DISCONTINUED | OUTPATIENT
Start: 2019-01-01 | End: 2019-01-01 | Stop reason: HOSPADM

## 2019-01-01 RX ORDER — AMLODIPINE BESYLATE 5 MG/1
5 TABLET ORAL DAILY
Qty: 30 TABLET | Refills: 0 | Status: SHIPPED | OUTPATIENT
Start: 2019-01-01

## 2019-01-01 RX ORDER — METOPROLOL TARTRATE 50 MG/1
50 TABLET, FILM COATED ORAL 2 TIMES DAILY
Qty: 180 TABLET | Refills: 0 | Status: SHIPPED | OUTPATIENT
Start: 2019-01-01

## 2019-01-01 RX ORDER — METOPROLOL TARTRATE 37.5 MG/1
50 TABLET, FILM COATED ORAL 2 TIMES DAILY
Qty: 30 TABLET | Refills: 2 | Status: SHIPPED | OUTPATIENT
Start: 2019-01-01 | End: 2019-01-01 | Stop reason: ALTCHOICE

## 2019-01-01 RX ORDER — LIDOCAINE HYDROCHLORIDE 20 MG/ML
JELLY TOPICAL PRN
Status: DISCONTINUED | OUTPATIENT
Start: 2019-01-01 | End: 2019-01-01 | Stop reason: HOSPADM

## 2019-01-01 RX ORDER — LISINOPRIL 20 MG/1
20 TABLET ORAL DAILY
Qty: 30 TABLET | Refills: 1 | Status: SHIPPED | OUTPATIENT
Start: 2019-01-01 | End: 2019-01-01

## 2019-01-01 RX ORDER — LIDOCAINE HYDROCHLORIDE 20 MG/ML
5 INJECTION, SOLUTION EPIDURAL; INFILTRATION; INTRACAUDAL; PERINEURAL ONCE
Status: DISCONTINUED | OUTPATIENT
Start: 2019-01-01 | End: 2019-01-01 | Stop reason: HOSPADM

## 2019-01-01 RX ORDER — OMEPRAZOLE 20 MG/1
20 CAPSULE, DELAYED RELEASE ORAL DAILY
Qty: 90 CAPSULE | Refills: 0 | Status: SHIPPED | OUTPATIENT
Start: 2019-01-01 | End: 2020-01-01 | Stop reason: ALTCHOICE

## 2019-01-01 RX ORDER — ALBUTEROL SULFATE 90 UG/1
2 AEROSOL, METERED RESPIRATORY (INHALATION) EVERY 6 HOURS PRN
Qty: 1 INHALER | Refills: 3 | Status: SHIPPED
Start: 2019-01-01 | End: 2020-01-01 | Stop reason: SDUPTHER

## 2019-01-01 RX ORDER — OMEPRAZOLE 20 MG/1
20 CAPSULE, DELAYED RELEASE ORAL DAILY
Qty: 90 CAPSULE | Refills: 0 | Status: SHIPPED | OUTPATIENT
Start: 2019-01-01 | End: 2019-01-01 | Stop reason: SDUPTHER

## 2019-01-01 RX ORDER — ALBUTEROL SULFATE 90 UG/1
AEROSOL, METERED RESPIRATORY (INHALATION)
Qty: 1 INHALER | Refills: 0 | Status: SHIPPED | OUTPATIENT
Start: 2019-01-01 | End: 2019-01-01 | Stop reason: ALTCHOICE

## 2019-01-01 RX ORDER — HYDROCHLOROTHIAZIDE 25 MG/1
25 TABLET ORAL EVERY MORNING
Status: DISCONTINUED | OUTPATIENT
Start: 2019-01-01 | End: 2019-01-01 | Stop reason: HOSPADM

## 2019-01-01 RX ORDER — ONDANSETRON 2 MG/ML
4 INJECTION INTRAMUSCULAR; INTRAVENOUS EVERY 6 HOURS PRN
Status: DISCONTINUED | OUTPATIENT
Start: 2019-01-01 | End: 2019-01-01 | Stop reason: HOSPADM

## 2019-01-01 RX ORDER — SODIUM CHLORIDE 9 MG/ML
INJECTION, SOLUTION INTRAVENOUS CONTINUOUS
Status: DISCONTINUED | OUTPATIENT
Start: 2019-01-01 | End: 2019-01-01 | Stop reason: HOSPADM

## 2019-01-01 RX ORDER — PROPRANOLOL HYDROCHLORIDE 80 MG/1
80 CAPSULE, EXTENDED RELEASE ORAL DAILY
Qty: 90 CAPSULE | Refills: 1 | Status: SHIPPED | OUTPATIENT
Start: 2019-01-01 | End: 2019-01-01

## 2019-01-01 RX ORDER — HYOSCYAMINE SULFATE 16 OZ
1 SOLUTION MISCELLANEOUS 3 TIMES DAILY
Refills: 2 | COMMUNITY
Start: 2019-01-01

## 2019-01-01 RX ORDER — ATORVASTATIN CALCIUM 40 MG/1
40 TABLET, FILM COATED ORAL NIGHTLY
Status: DISCONTINUED | OUTPATIENT
Start: 2019-01-01 | End: 2019-01-01 | Stop reason: HOSPADM

## 2019-01-01 RX ORDER — ALBUTEROL SULFATE 90 UG/1
2 AEROSOL, METERED RESPIRATORY (INHALATION) EVERY 6 HOURS PRN
Qty: 1 INHALER | Refills: 3 | Status: SHIPPED | OUTPATIENT
Start: 2019-01-01 | End: 2019-01-01 | Stop reason: SDUPTHER

## 2019-01-01 RX ORDER — FENTANYL CITRATE 50 UG/ML
50 INJECTION, SOLUTION INTRAMUSCULAR; INTRAVENOUS EVERY 5 MIN PRN
Status: DISCONTINUED | OUTPATIENT
Start: 2019-01-01 | End: 2019-01-01 | Stop reason: HOSPADM

## 2019-01-01 RX ORDER — LIDOCAINE HYDROCHLORIDE 20 MG/ML
JELLY TOPICAL ONCE
Status: DISCONTINUED | OUTPATIENT
Start: 2019-01-01 | End: 2019-01-01 | Stop reason: HOSPADM

## 2019-01-01 RX ORDER — SODIUM CHLORIDE, SODIUM LACTATE, POTASSIUM CHLORIDE, CALCIUM CHLORIDE 600; 310; 30; 20 MG/100ML; MG/100ML; MG/100ML; MG/100ML
INJECTION, SOLUTION INTRAVENOUS CONTINUOUS PRN
Status: DISCONTINUED | OUTPATIENT
Start: 2019-01-01 | End: 2019-01-01 | Stop reason: SDUPTHER

## 2019-01-01 RX ORDER — ALBUTEROL SULFATE 90 UG/1
2 AEROSOL, METERED RESPIRATORY (INHALATION) EVERY 6 HOURS PRN
Qty: 1 INHALER | Refills: 0 | Status: SHIPPED | OUTPATIENT
Start: 2019-01-01 | End: 2019-01-01 | Stop reason: SDUPTHER

## 2019-01-01 RX ORDER — PROPOFOL 10 MG/ML
INJECTION, EMULSION INTRAVENOUS CONTINUOUS PRN
Status: DISCONTINUED | OUTPATIENT
Start: 2019-01-01 | End: 2019-01-01 | Stop reason: SDUPTHER

## 2019-01-01 RX ORDER — LISINOPRIL 40 MG/1
40 TABLET ORAL DAILY
Qty: 30 TABLET | Refills: 0 | Status: SHIPPED | OUTPATIENT
Start: 2019-01-01 | End: 2019-01-01 | Stop reason: SDUPTHER

## 2019-01-01 RX ORDER — HYDROMORPHONE HYDROCHLORIDE 1 MG/ML
0.25 INJECTION, SOLUTION INTRAMUSCULAR; INTRAVENOUS; SUBCUTANEOUS EVERY 5 MIN PRN
Status: DISCONTINUED | OUTPATIENT
Start: 2019-01-01 | End: 2019-01-01 | Stop reason: HOSPADM

## 2019-01-01 RX ORDER — KETAMINE HYDROCHLORIDE 10 MG/ML
INJECTION, SOLUTION INTRAMUSCULAR; INTRAVENOUS PRN
Status: DISCONTINUED | OUTPATIENT
Start: 2019-01-01 | End: 2019-01-01 | Stop reason: SDUPTHER

## 2019-01-01 RX ORDER — PAROXETINE HYDROCHLORIDE 20 MG/1
20 TABLET, FILM COATED ORAL DAILY
Qty: 30 TABLET | Refills: 1 | Status: SHIPPED | OUTPATIENT
Start: 2019-01-01

## 2019-01-01 RX ORDER — IBUPROFEN 600 MG/1
600 TABLET ORAL EVERY 6 HOURS PRN
Qty: 120 TABLET | Refills: 1 | Status: SHIPPED | OUTPATIENT
Start: 2019-01-01 | End: 2020-01-01 | Stop reason: ALTCHOICE

## 2019-01-01 RX ORDER — LISINOPRIL 20 MG/1
40 TABLET ORAL DAILY
Qty: 30 TABLET | Refills: 1 | Status: ON HOLD | OUTPATIENT
Start: 2019-01-01 | End: 2019-01-01 | Stop reason: SDUPTHER

## 2019-01-01 RX ORDER — PIPERACILLIN SODIUM, TAZOBACTAM SODIUM 4; .5 G/20ML; G/20ML
4.5 INJECTION, POWDER, LYOPHILIZED, FOR SOLUTION INTRAVENOUS ONCE
Status: DISCONTINUED | OUTPATIENT
Start: 2019-01-01 | End: 2019-01-01

## 2019-01-01 RX ORDER — LISINOPRIL 40 MG/1
40 TABLET ORAL DAILY
Qty: 30 TABLET | Refills: 0 | Status: ON HOLD
Start: 2019-01-01 | End: 2020-01-01 | Stop reason: HOSPADM

## 2019-01-01 RX ORDER — LISINOPRIL 20 MG/1
TABLET ORAL
Qty: 60 TABLET | Refills: 0 | OUTPATIENT
Start: 2019-01-01

## 2019-01-01 RX ORDER — TRAZODONE HYDROCHLORIDE 50 MG/1
50 TABLET ORAL NIGHTLY
Status: DISCONTINUED | OUTPATIENT
Start: 2019-01-01 | End: 2019-01-01

## 2019-01-01 RX ORDER — METOPROLOL TARTRATE 50 MG/1
50 TABLET, FILM COATED ORAL 2 TIMES DAILY
Status: DISCONTINUED | OUTPATIENT
Start: 2019-01-01 | End: 2019-01-01 | Stop reason: HOSPADM

## 2019-01-01 RX ORDER — MONTELUKAST SODIUM 10 MG/1
10 TABLET ORAL NIGHTLY
Qty: 30 TABLET | Refills: 1 | Status: SHIPPED | OUTPATIENT
Start: 2019-01-01 | End: 2019-01-01 | Stop reason: SDUPTHER

## 2019-01-01 RX ORDER — AMLODIPINE BESYLATE 5 MG/1
5 TABLET ORAL DAILY
Qty: 30 TABLET | Refills: 0 | Status: SHIPPED | OUTPATIENT
Start: 2019-01-01 | End: 2019-01-01 | Stop reason: SDUPTHER

## 2019-01-01 RX ORDER — ONDANSETRON 2 MG/ML
4 INJECTION INTRAMUSCULAR; INTRAVENOUS
Status: DISCONTINUED | OUTPATIENT
Start: 2019-01-01 | End: 2019-01-01 | Stop reason: HOSPADM

## 2019-01-01 RX ORDER — FENTANYL CITRATE 50 UG/ML
25 INJECTION, SOLUTION INTRAMUSCULAR; INTRAVENOUS EVERY 5 MIN PRN
Status: DISCONTINUED | OUTPATIENT
Start: 2019-01-01 | End: 2019-01-01 | Stop reason: HOSPADM

## 2019-01-01 RX ORDER — AMLODIPINE BESYLATE 5 MG/1
5 TABLET ORAL DAILY
Qty: 30 TABLET | Refills: 3 | Status: SHIPPED | OUTPATIENT
Start: 2019-01-01 | End: 2019-01-01 | Stop reason: SDUPTHER

## 2019-01-01 RX ORDER — MONTELUKAST SODIUM 10 MG/1
10 TABLET ORAL NIGHTLY
Qty: 30 TABLET | Refills: 0 | Status: SHIPPED | OUTPATIENT
Start: 2019-01-01 | End: 2020-01-01 | Stop reason: ALTCHOICE

## 2019-01-01 RX ORDER — SODIUM CHLORIDE 0.9 % (FLUSH) 0.9 %
10 SYRINGE (ML) INJECTION EVERY 12 HOURS SCHEDULED
Status: DISCONTINUED | OUTPATIENT
Start: 2019-01-01 | End: 2019-01-01 | Stop reason: HOSPADM

## 2019-01-01 RX ORDER — MAGNESIUM SULFATE 1 G/100ML
1 INJECTION INTRAVENOUS PRN
Status: DISCONTINUED | OUTPATIENT
Start: 2019-01-01 | End: 2019-01-01 | Stop reason: HOSPADM

## 2019-01-01 RX ORDER — LISINOPRIL 20 MG/1
20 TABLET ORAL DAILY
Qty: 30 TABLET | Refills: 0 | Status: SHIPPED | OUTPATIENT
Start: 2019-01-01 | End: 2019-01-01 | Stop reason: SDUPTHER

## 2019-01-01 RX ORDER — MIDAZOLAM HYDROCHLORIDE 1 MG/ML
INJECTION INTRAMUSCULAR; INTRAVENOUS PRN
Status: DISCONTINUED | OUTPATIENT
Start: 2019-01-01 | End: 2019-01-01 | Stop reason: SDUPTHER

## 2019-01-01 RX ADMIN — LISINOPRIL 40 MG: 20 TABLET ORAL at 08:27

## 2019-01-01 RX ADMIN — METOPROLOL TARTRATE 50 MG: 50 TABLET ORAL at 09:46

## 2019-01-01 RX ADMIN — METOPROLOL TARTRATE 50 MG: 50 TABLET ORAL at 08:56

## 2019-01-01 RX ADMIN — Medication 2000 MG: at 21:35

## 2019-01-01 RX ADMIN — SODIUM CHLORIDE: 9 INJECTION, SOLUTION INTRAVENOUS at 09:36

## 2019-01-01 RX ADMIN — VANCOMYCIN HYDROCHLORIDE 2000 MG: 10 INJECTION, POWDER, LYOPHILIZED, FOR SOLUTION INTRAVENOUS at 05:04

## 2019-01-01 RX ADMIN — FAMOTIDINE 20 MG: 10 INJECTION, SOLUTION INTRAVENOUS at 21:18

## 2019-01-01 RX ADMIN — PIPERACILLIN AND TAZOBACTAM 3.38 G: 3; .375 INJECTION, POWDER, FOR SOLUTION INTRAVENOUS at 14:01

## 2019-01-01 RX ADMIN — MAGNESIUM SULFATE IN DEXTROSE 1 G: 10 INJECTION, SOLUTION INTRAVENOUS at 10:46

## 2019-01-01 RX ADMIN — Medication 10 ML: at 23:33

## 2019-01-01 RX ADMIN — IOPAMIDOL 80 ML: 755 INJECTION, SOLUTION INTRAVENOUS at 15:09

## 2019-01-01 RX ADMIN — PIPERACILLIN AND TAZOBACTAM 3.38 G: 3; .375 INJECTION, POWDER, FOR SOLUTION INTRAVENOUS at 16:55

## 2019-01-01 RX ADMIN — ENOXAPARIN SODIUM 40 MG: 40 INJECTION SUBCUTANEOUS at 12:09

## 2019-01-01 RX ADMIN — FAMOTIDINE 20 MG: 10 INJECTION, SOLUTION INTRAVENOUS at 09:13

## 2019-01-01 RX ADMIN — MOMETASONE FUROATE AND FORMOTEROL FUMARATE DIHYDRATE 2 PUFF: 200; 5 AEROSOL RESPIRATORY (INHALATION) at 17:44

## 2019-01-01 RX ADMIN — PAROXETINE HYDROCHLORIDE 20 MG: 20 TABLET, FILM COATED ORAL at 09:46

## 2019-01-01 RX ADMIN — ENOXAPARIN SODIUM 40 MG: 40 INJECTION SUBCUTANEOUS at 09:46

## 2019-01-01 RX ADMIN — PAROXETINE HYDROCHLORIDE 20 MG: 20 TABLET, FILM COATED ORAL at 09:13

## 2019-01-01 RX ADMIN — FAMOTIDINE 20 MG: 10 INJECTION, SOLUTION INTRAVENOUS at 12:10

## 2019-01-01 RX ADMIN — LISINOPRIL 40 MG: 20 TABLET ORAL at 12:09

## 2019-01-01 RX ADMIN — ENOXAPARIN SODIUM 40 MG: 40 INJECTION SUBCUTANEOUS at 08:27

## 2019-01-01 RX ADMIN — PIPERACILLIN AND TAZOBACTAM 3.38 G: 3; .375 INJECTION, POWDER, FOR SOLUTION INTRAVENOUS at 12:13

## 2019-01-01 RX ADMIN — PIPERACILLIN AND TAZOBACTAM 3.38 G: 3; .375 INJECTION, POWDER, FOR SOLUTION INTRAVENOUS at 13:52

## 2019-01-01 RX ADMIN — PIPERACILLIN AND TAZOBACTAM 3.38 G: 3; .375 INJECTION, POWDER, FOR SOLUTION INTRAVENOUS at 21:18

## 2019-01-01 RX ADMIN — PIPERACILLIN AND TAZOBACTAM 3.38 G: 3; .375 INJECTION, POWDER, FOR SOLUTION INTRAVENOUS at 06:19

## 2019-01-01 RX ADMIN — METOPROLOL TARTRATE 50 MG: 50 TABLET ORAL at 23:32

## 2019-01-01 RX ADMIN — HYDROCHLOROTHIAZIDE 25 MG: 25 TABLET ORAL at 09:46

## 2019-01-01 RX ADMIN — FAMOTIDINE 20 MG: 10 INJECTION, SOLUTION INTRAVENOUS at 20:15

## 2019-01-01 RX ADMIN — MOMETASONE FUROATE AND FORMOTEROL FUMARATE DIHYDRATE 2 PUFF: 200; 5 AEROSOL RESPIRATORY (INHALATION) at 23:13

## 2019-01-01 RX ADMIN — SODIUM CHLORIDE, POTASSIUM CHLORIDE, SODIUM LACTATE AND CALCIUM CHLORIDE: 600; 310; 30; 20 INJECTION, SOLUTION INTRAVENOUS at 09:15

## 2019-01-01 RX ADMIN — FAMOTIDINE 20 MG: 10 INJECTION, SOLUTION INTRAVENOUS at 08:28

## 2019-01-01 RX ADMIN — MOMETASONE FUROATE AND FORMOTEROL FUMARATE DIHYDRATE 2 PUFF: 200; 5 AEROSOL RESPIRATORY (INHALATION) at 05:05

## 2019-01-01 RX ADMIN — MIDAZOLAM 2 MG: 1 INJECTION INTRAMUSCULAR; INTRAVENOUS at 09:13

## 2019-01-01 RX ADMIN — Medication 10 ML: at 08:28

## 2019-01-01 RX ADMIN — PIPERACILLIN AND TAZOBACTAM 3.38 G: 3; .375 INJECTION, POWDER, FOR SOLUTION INTRAVENOUS at 06:56

## 2019-01-01 RX ADMIN — KETAMINE HYDROCHLORIDE 50 MG: 10 INJECTION, SOLUTION INTRAMUSCULAR; INTRAVENOUS at 09:30

## 2019-01-01 RX ADMIN — HYDROCHLOROTHIAZIDE 25 MG: 25 TABLET ORAL at 08:27

## 2019-01-01 RX ADMIN — MOMETASONE FUROATE AND FORMOTEROL FUMARATE DIHYDRATE 2 PUFF: 200; 5 AEROSOL RESPIRATORY (INHALATION) at 19:29

## 2019-01-01 RX ADMIN — ATORVASTATIN CALCIUM 40 MG: 40 TABLET, FILM COATED ORAL at 21:19

## 2019-01-01 RX ADMIN — HYDROCHLOROTHIAZIDE 25 MG: 25 TABLET ORAL at 09:13

## 2019-01-01 RX ADMIN — PROPOFOL 100 MCG/KG/MIN: 10 INJECTION, EMULSION INTRAVENOUS at 09:17

## 2019-01-01 RX ADMIN — METOPROLOL TARTRATE 50 MG: 50 TABLET ORAL at 21:18

## 2019-01-01 RX ADMIN — MOMETASONE FUROATE AND FORMOTEROL FUMARATE DIHYDRATE 2 PUFF: 200; 5 AEROSOL RESPIRATORY (INHALATION) at 06:55

## 2019-01-01 RX ADMIN — VANCOMYCIN HYDROCHLORIDE 2000 MG: 10 INJECTION, POWDER, LYOPHILIZED, FOR SOLUTION INTRAVENOUS at 04:32

## 2019-01-01 RX ADMIN — PIPERACILLIN SODIUM AND TAZOBACTAM SODIUM 4.5 G: 4; .5 INJECTION, POWDER, LYOPHILIZED, FOR SOLUTION INTRAVENOUS at 20:42

## 2019-01-01 RX ADMIN — METOPROLOL TARTRATE 50 MG: 50 TABLET ORAL at 08:27

## 2019-01-01 RX ADMIN — ENOXAPARIN SODIUM 40 MG: 40 INJECTION SUBCUTANEOUS at 09:14

## 2019-01-01 RX ADMIN — MOMETASONE FUROATE AND FORMOTEROL FUMARATE DIHYDRATE 2 PUFF: 200; 5 AEROSOL RESPIRATORY (INHALATION) at 19:03

## 2019-01-01 RX ADMIN — VANCOMYCIN HYDROCHLORIDE 2000 MG: 10 INJECTION, POWDER, LYOPHILIZED, FOR SOLUTION INTRAVENOUS at 06:38

## 2019-01-01 RX ADMIN — MOMETASONE FUROATE AND FORMOTEROL FUMARATE DIHYDRATE 2 PUFF: 200; 5 AEROSOL RESPIRATORY (INHALATION) at 06:19

## 2019-01-01 RX ADMIN — HYDROCHLOROTHIAZIDE 25 MG: 25 TABLET ORAL at 12:09

## 2019-01-01 RX ADMIN — MAGNESIUM SULFATE IN DEXTROSE 1 G: 10 INJECTION, SOLUTION INTRAVENOUS at 09:25

## 2019-01-01 RX ADMIN — METOPROLOL TARTRATE 50 MG: 50 TABLET ORAL at 09:13

## 2019-01-01 RX ADMIN — FAMOTIDINE 20 MG: 10 INJECTION, SOLUTION INTRAVENOUS at 09:49

## 2019-01-01 RX ADMIN — FENTANYL CITRATE 50 MCG: 50 INJECTION, SOLUTION INTRAMUSCULAR; INTRAVENOUS at 09:31

## 2019-01-01 RX ADMIN — IOHEXOL 50 ML: 240 INJECTION, SOLUTION INTRATHECAL; INTRAVASCULAR; INTRAVENOUS; ORAL at 15:09

## 2019-01-01 RX ADMIN — LISINOPRIL 40 MG: 20 TABLET ORAL at 09:13

## 2019-01-01 RX ADMIN — FAMOTIDINE 20 MG: 10 INJECTION, SOLUTION INTRAVENOUS at 23:34

## 2019-01-01 RX ADMIN — ATORVASTATIN CALCIUM 40 MG: 40 TABLET, FILM COATED ORAL at 20:15

## 2019-01-01 RX ADMIN — PIPERACILLIN AND TAZOBACTAM 3.38 G: 3; .375 INJECTION, POWDER, FOR SOLUTION INTRAVENOUS at 10:35

## 2019-01-01 RX ADMIN — PAROXETINE HYDROCHLORIDE 20 MG: 20 TABLET, FILM COATED ORAL at 12:10

## 2019-01-01 RX ADMIN — Medication 10 ML: at 08:57

## 2019-01-01 RX ADMIN — PIPERACILLIN AND TAZOBACTAM 3.38 G: 3; .375 INJECTION, POWDER, FOR SOLUTION INTRAVENOUS at 23:21

## 2019-01-01 RX ADMIN — KETAMINE HYDROCHLORIDE 50 MG: 10 INJECTION, SOLUTION INTRAMUSCULAR; INTRAVENOUS at 09:23

## 2019-01-01 RX ADMIN — LISINOPRIL 40 MG: 20 TABLET ORAL at 09:46

## 2019-01-01 RX ADMIN — PAROXETINE HYDROCHLORIDE 20 MG: 20 TABLET, FILM COATED ORAL at 08:27

## 2019-01-01 RX ADMIN — MOMETASONE FUROATE AND FORMOTEROL FUMARATE DIHYDRATE 2 PUFF: 200; 5 AEROSOL RESPIRATORY (INHALATION) at 06:36

## 2019-01-01 RX ADMIN — VANCOMYCIN HYDROCHLORIDE 2000 MG: 10 INJECTION, POWDER, LYOPHILIZED, FOR SOLUTION INTRAVENOUS at 12:24

## 2019-01-01 RX ADMIN — PIPERACILLIN AND TAZOBACTAM 3.38 G: 3; .375 INJECTION, POWDER, FOR SOLUTION INTRAVENOUS at 22:19

## 2019-01-01 RX ADMIN — SODIUM CHLORIDE: 9 INJECTION, SOLUTION INTRAVENOUS at 19:17

## 2019-01-01 RX ADMIN — VANCOMYCIN HYDROCHLORIDE 2000 MG: 10 INJECTION, POWDER, LYOPHILIZED, FOR SOLUTION INTRAVENOUS at 21:24

## 2019-01-01 RX ADMIN — VANCOMYCIN HYDROCHLORIDE 2000 MG: 10 INJECTION, POWDER, LYOPHILIZED, FOR SOLUTION INTRAVENOUS at 17:21

## 2019-01-01 RX ADMIN — FENTANYL CITRATE 50 MCG: 50 INJECTION, SOLUTION INTRAMUSCULAR; INTRAVENOUS at 09:17

## 2019-01-01 ASSESSMENT — ENCOUNTER SYMPTOMS
RHINORRHEA: 0
EYE DISCHARGE: 0
VOMITING: 0
VOMITING: 0
TROUBLE SWALLOWING: 0
SHORTNESS OF BREATH: 0
PHOTOPHOBIA: 0
CHEST TIGHTNESS: 0
SHORTNESS OF BREATH: 0
BLOOD IN STOOL: 0
COUGH: 0
WHEEZING: 0
NAUSEA: 0
ABDOMINAL PAIN: 0
COUGH: 0
SHORTNESS OF BREATH: 0
CHOKING: 0
WHEEZING: 0
COUGH: 0
BLOOD IN STOOL: 0
CONSTIPATION: 0
ABDOMINAL DISTENTION: 0
SORE THROAT: 0
NAUSEA: 0
BACK PAIN: 0
SHORTNESS OF BREATH: 0
ABDOMINAL PAIN: 0
SORE THROAT: 0
RHINORRHEA: 0
CONSTIPATION: 0
CONSTIPATION: 0
TROUBLE SWALLOWING: 0
SORE THROAT: 0
COUGH: 0
RHINORRHEA: 0
COUGH: 0
SHORTNESS OF BREATH: 0
WHEEZING: 0
VOMITING: 0
SHORTNESS OF BREATH: 0
EYE DISCHARGE: 0
DIARRHEA: 0
NAUSEA: 0
TROUBLE SWALLOWING: 0
NAUSEA: 0
RHINORRHEA: 0
SHORTNESS OF BREATH: 0
NAUSEA: 1
EYE DISCHARGE: 0
SORE THROAT: 0
SORE THROAT: 0
VOMITING: 0
ABDOMINAL PAIN: 0
DIARRHEA: 0
WHEEZING: 0
DIARRHEA: 0
PHOTOPHOBIA: 0
WHEEZING: 0
CONSTIPATION: 0
EYE PAIN: 0
DIARRHEA: 0
DIARRHEA: 0
NAUSEA: 0
CONSTIPATION: 0
BACK PAIN: 0
NAUSEA: 0
WHEEZING: 0
VOMITING: 0
ABDOMINAL DISTENTION: 0
DIARRHEA: 0
RHINORRHEA: 0
CHOKING: 0
ABDOMINAL PAIN: 0
WHEEZING: 0
CONSTIPATION: 0
BACK PAIN: 0
SORE THROAT: 0
EYE PAIN: 0
RHINORRHEA: 0
WHEEZING: 0
DIARRHEA: 0
DIARRHEA: 0
PHOTOPHOBIA: 0
ABDOMINAL PAIN: 0
VOMITING: 0
PHOTOPHOBIA: 0
SHORTNESS OF BREATH: 0
VOMITING: 0
EYE PAIN: 0
CHOKING: 0
NAUSEA: 0
CHEST TIGHTNESS: 0
COUGH: 0
RHINORRHEA: 0
SHORTNESS OF BREATH: 0
VOMITING: 0
NAUSEA: 0
PHOTOPHOBIA: 0
BACK PAIN: 0
CONSTIPATION: 0
SORE THROAT: 0
RHINORRHEA: 0
DIARRHEA: 0
BLOOD IN STOOL: 0
DIARRHEA: 0
PHOTOPHOBIA: 0
VOMITING: 0
COUGH: 0
COUGH: 0
SORE THROAT: 0
CONSTIPATION: 0
NAUSEA: 0
PHOTOPHOBIA: 0
PHOTOPHOBIA: 0
VOMITING: 0
ABDOMINAL DISTENTION: 0
CHEST TIGHTNESS: 0
WHEEZING: 0

## 2019-01-01 ASSESSMENT — PULMONARY FUNCTION TESTS
PIF_VALUE: 1
PIF_VALUE: 1
PIF_VALUE: 0
PIF_VALUE: 1
PIF_VALUE: 0
PIF_VALUE: 1
PIF_VALUE: 0
PIF_VALUE: 1
PIF_VALUE: 0

## 2019-01-01 ASSESSMENT — PAIN SCALES - GENERAL
PAINLEVEL_OUTOF10: 0

## 2019-01-01 ASSESSMENT — PATIENT HEALTH QUESTIONNAIRE - PHQ9
SUM OF ALL RESPONSES TO PHQ QUESTIONS 1-9: 0
2. FEELING DOWN, DEPRESSED OR HOPELESS: 0
SUM OF ALL RESPONSES TO PHQ QUESTIONS 1-9: 0
1. LITTLE INTEREST OR PLEASURE IN DOING THINGS: 0
SUM OF ALL RESPONSES TO PHQ9 QUESTIONS 1 & 2: 0

## 2019-01-01 ASSESSMENT — PAIN - FUNCTIONAL ASSESSMENT
PAIN_FUNCTIONAL_ASSESSMENT: ACTIVITIES ARE NOT PREVENTED
PAIN_FUNCTIONAL_ASSESSMENT: ACTIVITIES ARE NOT PREVENTED

## 2019-01-01 ASSESSMENT — PAIN DESCRIPTION - ONSET: ONSET: OTHER (COMMENT)

## 2019-01-01 ASSESSMENT — PAIN DESCRIPTION - PROGRESSION
CLINICAL_PROGRESSION: NOT CHANGED
CLINICAL_PROGRESSION: NOT CHANGED

## 2019-03-29 NOTE — PROGRESS NOTES
Subjective:  38 y.o. male who presents in office today regarding:    Noncompliance  The patient has a history of non-compliance. He does not like to visit doctors offices. His blood pressure has been poorly controlled and he was to come in at the beginning of the year for follow-up but did not. He does have a history of depression and goes to    HTN  Poorly controlled. been uncontrolled for some time. He says he is taking his metoprolol as prescribed. Insomnia  Using alcohol to sleep every day. Has tried melatonin and zzquil with no success. Health Maintenance Due   Topic Date Due    Pneumococcal 0-64 years at Risk Vaccine (1 of 1 - PPSV23) 01/24/1982    HIV screen  01/24/1991    Flu vaccine (1) 09/01/2018     Current Outpatient Medications on File Prior to Visit   Medication Sig Dispense Refill    PARoxetine (PAXIL) 20 MG tablet Take 1 tablet by mouth daily 30 tablet 0    lisinopril (PRINIVIL;ZESTRIL) 20 MG tablet Take 1 tablet by mouth daily 30 tablet 0    albuterol sulfate HFA (VENTOLIN HFA) 108 (90 Base) MCG/ACT inhaler Inhale 2 puffs into the lungs every 6 hours as needed for Wheezing 1 Inhaler 0    fluticasone-salmeterol (ADVAIR DISKUS) 250-50 MCG/DOSE AEPB Inhale 1 puff into the lungs 2 times daily 60 each 0    ibuprofen (ADVIL;MOTRIN) 600 MG tablet Take 1 tablet by mouth every 6 hours as needed for Pain 120 tablet 1    metoprolol tartrate (LOPRESSOR) 25 MG tablet Take 1 tablet by mouth 2 times daily 60 tablet 1    omeprazole (PRILOSEC) 20 MG delayed release capsule Take 1 capsule by mouth Daily 90 capsule 0    dimethyl Fumarate (TECFIDERA) 240 MG delayed release capsule Take 1 capsule by mouth 2 times daily 60 capsule 11    OXYGEN Inhale 4 L into the lungs       Respiratory Therapy Supplies WINSTON by Does not apply route.  1 Device 0    Blood Pressure KIT 1 kit by Does not apply route once for 1 dose 1 kit 0    gabapentin (NEURONTIN) 400 MG capsule Take 1 capsule by mouth 4 times daily 120 capsule 5     No current facility-administered medications on file prior to visit. Review of Systems   Constitutional: Negative for chills, fatigue and fever. Obesity   HENT: Negative for congestion, hearing loss, rhinorrhea and sore throat. Eyes: Negative for photophobia and visual disturbance. Respiratory: Negative for cough, shortness of breath and wheezing. Cardiovascular: Negative for chest pain, palpitations and leg swelling. Gastrointestinal: Negative for constipation, diarrhea, nausea and vomiting. Skin: Negative for rash. Neurological: Negative for dizziness, weakness and numbness. Objective:  Vitals:    03/29/19 1123 03/29/19 1130   BP: (!) 148/94 (!) 156/98   Pulse: 106    Temp: 98 °F (36.7 °C)    TempSrc: Oral    SpO2: 94%    Weight: (!) 397 lb 6.4 oz (180.3 kg)    Height: 5' 10.5\" (1.791 m)      Physical Exam   Constitutional: He is oriented to person, place, and time. HENT:   Head: Normocephalic and atraumatic. Eyes: Conjunctivae and EOM are normal. No scleral icterus. Neck: Normal range of motion. Cardiovascular: Normal rate, regular rhythm and normal heart sounds. Exam reveals no gallop. No murmur heard. Pulmonary/Chest: Effort normal and breath sounds normal. No respiratory distress. He has no wheezes. He exhibits no tenderness. Portable oxygen with nasal cannula in place at 6 L   Abdominal: He exhibits no distension. Musculoskeletal: He exhibits no edema. Neurological: He is alert and oriented to person, place, and time. Coordination normal.   Skin: Skin is warm. No rash noted. He is not diaphoretic. No erythema. Vitals reviewed. Osteopathic exam: Patient evaluated in the seated position. Normal thoracic kyphosis and lumbar lordosis. Assessment and Plan:  Colin Sanchez was seen today for follow-up, results and health maintenance. Diagnoses and all orders for this visit:    Difficulty sleeping  -     traZODone (DESYREL) 50 MG tablet;  Take 1 tablet by mouth nightly    Essential hypertension  -     Discontinue: metoprolol tartrate 37.5 MG TABS; Take 50 mg by mouth 2 times daily  -     lisinopril (PRINIVIL;ZESTRIL) 20 MG tablet; Take 1 tablet by mouth daily    Anxiety  -     PARoxetine (PAXIL) 20 MG tablet; Take 1 tablet by mouth daily    Asthma, moderate persistent, poorly-controlled  -     fluticasone-salmeterol (ADVAIR DISKUS) 250-50 MCG/DOSE AEPB; Inhale 1 puff into the lungs 2 times daily    Gastroesophageal reflux disease, esophagitis presence not specified  -     omeprazole (PRILOSEC) 20 MG delayed release capsule; Take 1 capsule by mouth Daily    Other orders  -     propranolol (INDERAL LA) 80 MG extended release capsule; Take 1 capsule by mouth daily    The patient was resistant to set up an appointment to come back. He was advised that he would be given prescriptions to last 1 month with 1 refill with the understanding he'll be coming back prior to getting refills. Importance of compliance and initial follow-up was again stressed in order to allow less frequent follow-up in the future once comorbidities are more well managed. Return in about 1 month (around 4/26/2019) for Hypertension, Insomnia. Patient may come in sooner if needed for medical concerns. Patient advised to call at any time to cancel or re-schedule or for any questions/concerns. Please note that >15 minutes was spent face-to-face with the patient gathering history, performing physical exam, discussing findings, counseling the patient, and determining plan forward. Patient and plan was discussed with attending physician, Dr. Renetta Brown . Kem Angeles DO PGY2 [facility]  03/29/19  12:15 PM    This note was created using voice dictation software. It was reviewed for accuracy but there may be inadvertent errors.

## 2019-03-29 NOTE — PATIENT INSTRUCTIONS
Patient Education        High Blood Pressure: Care Instructions  Overview    It's normal for blood pressure to go up and down throughout the day. But if it stays up, you have high blood pressure. Another name for high blood pressure is hypertension. Despite what a lot of people think, high blood pressure usually doesn't cause headaches or make you feel dizzy or lightheaded. It usually has no symptoms. But it does increase your risk of stroke, heart attack, and other problems. You and your doctor will talk about your risks of these problems based on your blood pressure. Your doctor will give you a goal for your blood pressure. Your goal will be based on your health and your age. Lifestyle changes, such as eating healthy and being active, are always important to help lower blood pressure. You might also take medicine to reach your blood pressure goal.  Follow-up care is a key part of your treatment and safety. Be sure to make and go to all appointments, and call your doctor if you are having problems. It's also a good idea to know your test results and keep a list of the medicines you take. How can you care for yourself at home? Medical treatment  · If you stop taking your medicine, your blood pressure will go back up. You may take one or more types of medicine to lower your blood pressure. Be safe with medicines. Take your medicine exactly as prescribed. Call your doctor if you think you are having a problem with your medicine. · Talk to your doctor before you start taking aspirin every day. Aspirin can help certain people lower their risk of a heart attack or stroke. But taking aspirin isn't right for everyone, because it can cause serious bleeding. · See your doctor regularly. You may need to see the doctor more often at first or until your blood pressure comes down.   · If you are taking blood pressure medicine, talk to your doctor before you take decongestants or anti-inflammatory medicine, such as ibuprofen. Some of these medicines can raise blood pressure. · Learn how to check your blood pressure at home. Lifestyle changes  · Stay at a healthy weight. This is especially important if you put on weight around the waist. Losing even 10 pounds can help you lower your blood pressure. · If your doctor recommends it, get more exercise. Walking is a good choice. Bit by bit, increase the amount you walk every day. Try for at least 30 minutes on most days of the week. You also may want to swim, bike, or do other activities. · Avoid or limit alcohol. Talk to your doctor about whether you can drink any alcohol. · Try to limit how much sodium you eat to less than 2,300 milligrams (mg) a day. Your doctor may ask you to try to eat less than 1,500 mg a day. · Eat plenty of fruits (such as bananas and oranges), vegetables, legumes, whole grains, and low-fat dairy products. · Lower the amount of saturated fat in your diet. Saturated fat is found in animal products such as milk, cheese, and meat. Limiting these foods may help you lose weight and also lower your risk for heart disease. · Do not smoke. Smoking increases your risk for heart attack and stroke. If you need help quitting, talk to your doctor about stop-smoking programs and medicines. These can increase your chances of quitting for good. When should you call for help? Call 911 anytime you think you may need emergency care. This may mean having symptoms that suggest that your blood pressure is causing a serious heart or blood vessel problem. Your blood pressure may be over 180/120.   For example, call 911 if:    · You have symptoms of a heart attack. These may include:  ? Chest pain or pressure, or a strange feeling in the chest.  ? Sweating. ? Shortness of breath. ? Nausea or vomiting. ? Pain, pressure, or a strange feeling in the back, neck, jaw, or upper belly or in one or both shoulders or arms. ? Lightheadedness or sudden weakness.   ? A fast or

## 2019-04-01 NOTE — TELEPHONE ENCOUNTER
Contacted pharmacy about clarification of metoprolol medication. Informed pharmacist that metoprolol was discontinued due to alternate therapy. Pharmacist voiced understanding.

## 2019-04-05 NOTE — PROGRESS NOTES
Attending Physician Statement  I have discussed the case, including pertinent history and exam findings with the resident. I agree with the documented assessment and plan. Patient will follow up 4 weeks.   Pascale Dash MD

## 2019-04-10 NOTE — TELEPHONE ENCOUNTER
Patient needs new inhaler albuterol isnt covered under insurance. He stated ventolin was previously.     Also, propranolol patient states he cant afford he states after insurance pays its still 150.00 plus dollars

## 2019-04-15 NOTE — TELEPHONE ENCOUNTER
The propranolol was discontinued in favor of metoprolol 50 mg twice daily which she took previously. His lisinopril was increased to 40 mg daily rather than 20. He can use his 20 mg tablets and he takes 2 daily until he runs out and then get the new prescription. Together they should be more affordable and should work to bring down his blood pressure.

## 2019-05-20 NOTE — PATIENT INSTRUCTIONS
Patient Education        High Blood Pressure: Care Instructions  Overview    It's normal for blood pressure to go up and down throughout the day. But if it stays up, you have high blood pressure. Another name for high blood pressure is hypertension. Despite what a lot of people think, high blood pressure usually doesn't cause headaches or make you feel dizzy or lightheaded. It usually has no symptoms. But it does increase your risk of stroke, heart attack, and other problems. You and your doctor will talk about your risks of these problems based on your blood pressure. Your doctor will give you a goal for your blood pressure. Your goal will be based on your health and your age. Lifestyle changes, such as eating healthy and being active, are always important to help lower blood pressure. You might also take medicine to reach your blood pressure goal.  Follow-up care is a key part of your treatment and safety. Be sure to make and go to all appointments, and call your doctor if you are having problems. It's also a good idea to know your test results and keep a list of the medicines you take. How can you care for yourself at home? Medical treatment  · If you stop taking your medicine, your blood pressure will go back up. You may take one or more types of medicine to lower your blood pressure. Be safe with medicines. Take your medicine exactly as prescribed. Call your doctor if you think you are having a problem with your medicine. · Talk to your doctor before you start taking aspirin every day. Aspirin can help certain people lower their risk of a heart attack or stroke. But taking aspirin isn't right for everyone, because it can cause serious bleeding. · See your doctor regularly. You may need to see the doctor more often at first or until your blood pressure comes down.   · If you are taking blood pressure medicine, talk to your doctor before you take decongestants or anti-inflammatory medicine, such as ibuprofen. Some of these medicines can raise blood pressure. · Learn how to check your blood pressure at home. Lifestyle changes  · Stay at a healthy weight. This is especially important if you put on weight around the waist. Losing even 10 pounds can help you lower your blood pressure. · If your doctor recommends it, get more exercise. Walking is a good choice. Bit by bit, increase the amount you walk every day. Try for at least 30 minutes on most days of the week. You also may want to swim, bike, or do other activities. · Avoid or limit alcohol. Talk to your doctor about whether you can drink any alcohol. · Try to limit how much sodium you eat to less than 2,300 milligrams (mg) a day. Your doctor may ask you to try to eat less than 1,500 mg a day. · Eat plenty of fruits (such as bananas and oranges), vegetables, legumes, whole grains, and low-fat dairy products. · Lower the amount of saturated fat in your diet. Saturated fat is found in animal products such as milk, cheese, and meat. Limiting these foods may help you lose weight and also lower your risk for heart disease. · Do not smoke. Smoking increases your risk for heart attack and stroke. If you need help quitting, talk to your doctor about stop-smoking programs and medicines. These can increase your chances of quitting for good. When should you call for help? Call 911 anytime you think you may need emergency care. This may mean having symptoms that suggest that your blood pressure is causing a serious heart or blood vessel problem. Your blood pressure may be over 180/120.   For example, call 911 if:    · You have symptoms of a heart attack. These may include:  ? Chest pain or pressure, or a strange feeling in the chest.  ? Sweating. ? Shortness of breath. ? Nausea or vomiting. ? Pain, pressure, or a strange feeling in the back, neck, jaw, or upper belly or in one or both shoulders or arms. ? Lightheadedness or sudden weakness.   ? A fast or irregular heartbeat.     · You have symptoms of a stroke. These may include:  ? Sudden numbness, tingling, weakness, or loss of movement in your face, arm, or leg, especially on only one side of your body. ? Sudden vision changes. ? Sudden trouble speaking. ? Sudden confusion or trouble understanding simple statements. ? Sudden problems with walking or balance. ? A sudden, severe headache that is different from past headaches.     · You have severe back or belly pain.    Do not wait until your blood pressure comes down on its own. Get help right away.   Call your doctor now or seek immediate care if:    · Your blood pressure is much higher than normal (such as 180/120 or higher), but you don't have symptoms.     · You think high blood pressure is causing symptoms, such as:  ? Severe headache.  ? Blurry vision.    Watch closely for changes in your health, and be sure to contact your doctor if:    · Your blood pressure measures higher than your doctor recommends at least 2 times. That means the top number is higher or the bottom number is higher, or both.     · You think you may be having side effects from your blood pressure medicine. Where can you learn more? Go to https://Jinnpepiceweb.ThumbAd. org and sign in to your SprinkleBit account. Enter Z814 in the Conjur box to learn more about \"High Blood Pressure: Care Instructions. \"     If you do not have an account, please click on the \"Sign Up Now\" link. Current as of: July 22, 2018  Content Version: 12.0  © 4707-2605 Healthwise, Incorporated. Care instructions adapted under license by [facility] ([facility]). If you have questions about a medical condition or this instruction, always ask your healthcare professional. [place] [address] any warranty or liability for your use of this information. Patient Education        Learning About Diuretics for High Blood Pressure  Introduction  Diuretics help to lower blood pressure.  This reduces your risk of a heart attack and stroke. It also reduces your risk of kidney disease. Diuretics cause your kidneys to remove sodium and water. They also relax the blood vessel walls. These help lower your blood pressure. Examples  · Chlorthalidone  · Hydrochlorothiazide  Possible side effects  There are some common side effects. They are:  · Too little potassium. · Feeling dizzy. · Rash. · Urinating a lot. · High blood sugar. (But this is not common.)  You may have other side effects. Check the information that comes with your medicine. What to know about taking this medicine  · You may take other medicines for blood pressure. Diuretics can help those work better. They can also prevent extra fluid in your body. · You may need to take potassium pills. Or you may have to watch how much potassium is in your food. Ask your doctor about this. · You may need blood tests to check your kidneys and your potassium level. · Take your medicines exactly as prescribed. Call your doctor if you think you are having a problem with your medicine. · Check with your doctor or pharmacist before you use any other medicines. This includes over-the-counter medicines. Make sure your doctor knows all of the medicines, vitamins, herbal products, and supplements you take. Taking some medicines together can cause problems. Where can you learn more? Go to https://FototwicspeVastech.Plot Projects. org and sign in to your Visto account. Enter W569 in the [facility] box to learn more about \"Learning About Diuretics for High Blood Pressure. \"     If you do not have an account, please click on the \"Sign Up Now\" link. Current as of: July 22, 2018  Content Version: 12.0  © 2089-4917 Healthwise, Incorporated. Care instructions adapted under license by [facility] ([facility]).  If you have questions about a medical condition or this instruction, always ask your healthcare professional. Promise Dumont disclaims any warranty or liability for your use of this information. Patient Education        Learning About Cutting Calories  How do calories affect your weight? Food gives your body energy. Energy from the food you eat is measured in calories. This energy keeps your heart beating, your brain active, and your muscles working. Your body needs a certain number of calories each day. After your body uses the calories it needs, it stores extra calories as fat. To lose weight safely, you have to eat fewer calories while eating in a healthy way. How many calories do you need each day? The more active you are, the more calories you need. When you are less active, you need fewer calories. How many calories you need each day also depends on several things, including your age and whether you are male or female. Here are some general guidelines for adults:  · Less active women and older adults need 1,600 to 2,000 calories each day. · Active women and less active men need 2,000 to 2,400 calories each day. · Active men need 2,400 to 3,000 calories each day. How can you cut calories and eat healthy meals? Whole grains, vegetables and fruits, and dried beans are good lower-calorie foods. They give you lots of nutrients and fiber. And they fill you up. Sweets, energy drinks, and soda pop are high in calories. They give you few nutrients and no fiber. Try to limit soda pop, fruit juice, and energy drinks. Drink water instead. Some fats can be part of a healthy diet. But cutting back on fats from highly processed foods like fast foods and many snack foods is a good way to lower the calories in your diet. Also, use smaller amounts of fats like butter, margarine, salad dressing, and mayonnaise. Add fresh garlic, lemon, or herbs to your meals to add flavor without adding fat. Meats and dairy products can be a big source of hidden fats. Try to choose lean or low-fat versions of these products.   Fat-free cookies, candies, chips, and frozen treats can still be high in sugar and calories. Some fat-free foods have more calories than regular ones. Eat fat-free treats in moderation, as you would other foods. If your favorite foods are high in fat, salt, sugar, or calories, limit how often you eat them. Eat smaller servings, or look for healthy substitutes. Fill up on fruits, vegetables, and whole grains. Eating at home  · Use meat as a side dish instead of as the main part of your meal.  · Try main dishes that use whole wheat pasta, brown rice, dried beans, or vegetables. · Find ways to cook with little or no fat, such as broiling, steaming, or grilling. · Use cooking spray instead of oil. If you use oil, use a monounsaturated oil, such as canola or olive oil. · Trim fat from meats before you cook them. · Drain off fat after you brown the meat or while you roast it. · Chill soups and stews after you cook them. Then skim the fat off the top after it hardens. Eating out  · Order foods that are broiled or poached rather than fried or breaded. · Cut back on the amount of butter or margarine that you use on bread. · Order sauces, gravies, and salad dressings on the side, and use only a little. · When you order pasta, choose tomato sauce rather than cream sauce. · Ask for salsa with your baked potato instead of sour cream, butter, cheese, or moreno. · Order meals in a small size instead of upgrading to a large. · Share an entree, or take part of your food home to eat as another meal.  · Share appetizers and desserts. Where can you learn more? Go to https://SplitforcepechandniVoices Heard Media.healthMangia. org and sign in to your [facility] account. Enter F042 in the Polyview [employer] box to learn more about \"Learning About Cutting Calories. \"     If you do not have an account, please click on the \"Sign Up Now\" link. Current as of: November 7, 2018  Content Version: 12.0  © 0477-5504 Healthwise, Incorporated. Care instructions adapted under license by [facility] ([facility]).

## 2019-05-20 NOTE — PROGRESS NOTES
[address] [facility]    Attending Physician Statement:  Vernon Eddy, DO    I have discussed the case, including pertinent history and exam findings with the resident. I agree with the assessment, plan and orders as documented by the resident. Patient here for routine follow up of medical problems. Remainder of medical problems as per resident note.

## 2019-05-20 NOTE — PROGRESS NOTES
congestion, hearing loss, rhinorrhea and sore throat. Eyes: Negative for photophobia and visual disturbance. Respiratory: Negative for cough, shortness of breath and wheezing. Cardiovascular: Negative for chest pain, palpitations and leg swelling. Gastrointestinal: Negative for constipation, diarrhea, nausea and vomiting. Skin: Negative for rash. Neurological: Negative for dizziness, weakness and numbness. Objective:  Vitals:    05/20/19 1100 05/20/19 1104   BP: (!) 151/92 (!) 158/76   Pulse: 89    Temp: 97.8 °F (36.6 °C)    TempSrc: Oral    SpO2: 94%    Weight: (!) 407 lb 3.2 oz (184.7 kg)    Height: 5' 10.5\" (1.791 m)      Physical Exam   Constitutional: He is oriented to person, place, and time. HENT:   Head: Normocephalic and atraumatic. Eyes: Conjunctivae and EOM are normal. No scleral icterus. Neck: Normal range of motion. Cardiovascular: Normal rate, regular rhythm and normal heart sounds. Exam reveals no gallop. No murmur heard. Pulmonary/Chest: Effort normal and breath sounds normal. No respiratory distress. He has no wheezes. He exhibits no tenderness. Abdominal: He exhibits no distension. Protuberant abdomen   Musculoskeletal: He exhibits no edema. Neurological: He is alert and oriented to person, place, and time. Coordination normal.   Skin: Skin is warm. No rash noted. He is not diaphoretic. No erythema. Vitals reviewed. Osteopathic exam: Patient evaluated in the seated position. Normal thoracic kyphosis and lumbar lordosis. Assessment and Plan:  Nito Love was seen today for 1 month follow-up, hypertension, health maintenance and uri. Diagnoses and all orders for this visit:    Essential hypertension  -     hydrochlorothiazide (HYDRODIURIL) 25 MG tablet;  Take 1 tablet by mouth every morning    Morbid obesity ([address]  -     Caitlin Lin MD, B[facility]    Worries that he is willing to consider seeing Dr. Kaylah Botello for weight loss so the referral has been placed. In discussion of the importance of blood pressure control was again made in order to clarify whether the patient needs to come in a month. He was advised that the appointments will be less frequent once his blood pressure is well-controlled. Return in about 1 month (around 6/17/2019). Patient may come in sooner if needed for medical concerns. Patient advised to call at any time to cancel or re-schedule or for any questions/concerns. Please note that >15 minutes was spent face-to-face with the patient gathering history, performing physical exam, discussing findings, counseling the patient, and determining plan forward. Patient and plan was discussed with attending physician, Dr. Justin Stein. Carola Faulkner DO PGY2 [facility]  05/20/19  1:01 PM    This note was created using voice dictation software. It was reviewed for accuracy but there may be inadvertent errors.

## 2019-06-05 PROBLEM — L03.311 ABDOMINAL WALL CELLULITIS: Status: ACTIVE | Noted: 2019-01-01

## 2019-06-06 PROBLEM — I10 ESSENTIAL HYPERTENSION: Chronic | Status: ACTIVE | Noted: 2019-01-01

## 2019-06-06 PROBLEM — E83.42 HYPOMAGNESEMIA: Status: ACTIVE | Noted: 2019-01-01

## 2019-06-06 PROBLEM — S31.109A OPEN WOUND OF ABDOMEN: Status: ACTIVE | Noted: 2019-01-01

## 2019-06-06 NOTE — H&P
Department of Family Medicine  History and Physical Note    Mati Burnett  :   1975 (38 y.o.)  MRN:   [id number]  Date:  2019  Primary Care Physician:    Shasha Casey DO  [address] [facility] [address]  Ph:  [phone]    Chief Complaint   Patient presents with    Wound Infection     Pt with bleeding, drainage, and foul smell from wound on under surface of pannis for over a week       History Obtained From:  patient, electronic medical record    HISTORY OF PRESENT ILLNESS:     The patient is a 38 y.o. male that  has a past medical history of Asthma, Hypertension, Movement disorder, MS (multiple sclerosis) ([address] and Psychiatric problem. Patient presents to [facility] complaining of a chronic wound infection to his lower abdomen. He has had bleeding, drainage and some foul smell from that wound for about a week. He is not in any respiratory distress, no chest pain or abdominal pain. He does have a large pannus with findings consistent for chronic skin changes. He has brawny skin with an area of necrosis in the center of his abdomen near his belly button. ED course:   Vitals significant for hypertension 152/83, tachypnea (RR: 24) and BMI: 56  Labs significant for mild hypochloremia. Medications - IV Zosyn and IV Vancomycin    Review of Systems   Constitutional: Negative for activity change, appetite change, chills, fever and unexpected weight change. HENT: Negative for congestion, ear pain, hearing loss, rhinorrhea, sore throat and trouble swallowing. Eyes: Negative for photophobia, pain, discharge and visual disturbance. Respiratory: Negative for cough, choking, chest tightness, shortness of breath and wheezing. Cardiovascular: Negative for chest pain, palpitations and leg swelling. Gastrointestinal: Positive for nausea. Negative for abdominal distention, abdominal pain, blood in stool, constipation, diarrhea and vomiting.    Endocrine: Negative for cold Social connections:     Talks on phone: None     Gets together: None     Attends Gnosticist service: None     Active member of club or organization: None     Attends meetings of clubs or organizations: None     Relationship status: None    Intimate partner violence:     Fear of current or ex partner: None     Emotionally abused: None     Physically abused: None     Forced sexual activity: None   Other Topics Concern    None   Social History Narrative    None       Family History   Problem Relation Age of Onset    Depression Mother     Osteoporosis Mother     Asthma Father     Cancer Maternal Aunt     Cancer Maternal Uncle         leukemia    Arthritis Maternal Grandmother     Cancer Paternal Grandmother         lung cancer       Allergies: Allergies   Allergen Reactions    Cats Claw (Uncaria Tomentosa)        Prior to Admission medications    Medication Sig Start Date End Date Taking?  Authorizing Provider   hydrochlorothiazide (HYDRODIURIL) 25 MG tablet Take 1 tablet by mouth every morning 5/20/19  Yes Riana Woodson DO   albuterol sulfate HFA (VENTOLIN HFA) 108 (90 Base) MCG/ACT inhaler Inhale 2 puffs into the lungs every 6 hours as needed for Wheezing 4/15/19  Yes Riana Woodson DO   metoprolol tartrate (LOPRESSOR) 50 MG tablet Take 1 tablet by mouth 2 times daily 4/15/19  Yes Riana Woodson DO   lisinopril (PRINIVIL;ZESTRIL) 20 MG tablet Take 2 tablets by mouth daily 4/15/19  Yes Riana Woodson DO   PARoxetine (PAXIL) 20 MG tablet Take 1 tablet by mouth daily 3/29/19  Yes Riana Woodson DO   fluticasone-salmeterol (ADVAIR DISKUS) 250-50 MCG/DOSE AEPB Inhale 1 puff into the lungs 2 times daily 3/29/19  Yes Riana Woodson DO   omeprazole (PRILOSEC) 20 MG delayed release capsule Take 1 capsule by mouth Daily 3/29/19 6/27/19 Yes Riana Woodson DO   ibuprofen (ADVIL;MOTRIN) 600 MG tablet Take 1 tablet by mouth every 6 hours as needed for Pain 12/14/18  Yes Riana Woodson DO   gabapentin (NEURONTIN) 400 MG capsule Take 1 capsule by mouth 4 times daily 8/7/15  Yes Martinez Schulz MD   dimethyl Fumarate [facility]) 240 MG delayed release capsule Take 1 capsule by mouth 2 times daily 8/7/15   Martinez Schulz MD   OXYGEN Inhale 4 L into the lungs     Historical Provider, MD   Respiratory Therapy Supplies [place] by Does not apply route. 5/28/13   hCantelle Ngo DO         OBJECTIVE:     Vitals:    06/05/19 2157 06/05/19 2300 06/05/19 2332 06/06/19 0708   BP: (!) 161/88 131/61 (!) 144/60    Pulse: 90 92 88    Resp: 16 18     Temp: 97.7 °F (36.5 °C) 97.9 °F (36.6 °C)     TempSrc: Oral Oral     SpO2: 95% 95%     Weight:    (!) 397 lb (180.1 kg)   Height:         BP (!) 144/60   Pulse 88   Temp 97.9 °F (36.6 °C) (Oral)   Resp 18   Ht 5' 10.5\" (1.791 m)   Wt (!) 397 lb (180.1 kg)   SpO2 95%   BMI 56.16 kg/m²     Physical Exam   Constitutional: He is oriented to person, place, and time. He appears well-developed. He has a sickly appearance. No distress. Nasal cannula in place. Wearing 4.5L O2 via NC; large body habitus   HENT:   Head: Normocephalic and atraumatic. Right Ear: External ear normal.   Left Ear: External ear normal.   Nose: Nose normal.   Mouth/Throat: Oropharynx is clear and moist.   Eyes: Pupils are equal, round, and reactive to light. Conjunctivae and EOM are normal.   Neck: Normal range of motion. Neck supple. No thyromegaly present. Cardiovascular: Normal rate, regular rhythm, normal heart sounds and intact distal pulses. Exam reveals no gallop and no friction rub. No murmur heard. Pulmonary/Chest: Effort normal. No respiratory distress. He has decreased breath sounds. He has no wheezes. He has no rhonchi. He has rales. Abdominal: Soft. Bowel sounds are normal. He exhibits no distension and no mass. There is no hepatosplenomegaly. There is no tenderness.    Low-lying pannus with midline necrotic wound with surrounding erythema and purulent drainage Musculoskeletal: Normal range of motion. He exhibits no edema or tenderness. Neurological: He is alert and oriented to person, place, and time. He has normal reflexes. Skin: Skin is warm and dry. Lesion noted. No rash noted. Psychiatric: He has a normal mood and affect. Judgment and thought content normal.   Vitals reviewed. Osteopathic exam:  Patient evaluated in the seated position. Normal thoracic kyphosis and lumbar lordosis. No acute tissue texture changes. No acute somatic dysfunction of thecervical, thoracic, or lumbar spine. LABORATORY RESULTS  CMP:  Recent Labs     06/05/19 1925   *   K 4.3   CL 89*   CO2 28   BUN 14   LABALBU 3.4*   CREATININE 0.6*   CALCIUM 8.9   GFRAA >60   LABGLOM >60   GLUCOSE 113*   ALKPHOS 127   AST 48*   ALT 19   PROT 8.7*   BILITOT 1.1     CBC:  Recent Labs     06/05/19 1925   WBC 6.0   RBC 3.26*   HGB 12.7   HCT 35.3*   .3*   MCH 39.0*   MCHC 36.0*   RDW 13.4      MPV 9.4     Troponin:  Recent Labs     06/05/19 1925   TROPONINI 0.01     Hemoglobin A1c:    Lab Results   Component Value Date    LABA1C 4.8 03/27/2019     PT, INR:  No results for input(s): PROTIME, INR in the last 72 hours. Lipid Panel:  No results for input(s): CHOL, TRIG, HDL, LDLCALC in the last 72 hours. TSH:  No results for input(s): TSH in the last 72 hours. Urinalysis:  @LABRCNT(bacteria,bloodu,clarityu,NITRU,phur,proteinu,rbcua,specgrav,wbcua,coloru,LEUKOCYTESUR,urobilinogen,bilirubinur,glucoseu,ketua)@    IMAGING  No results found. ASSESSMENT:  Marlon Sinha is a 38 y.o. male    1. Open Necrotic abdominal wound with cellulitis  2. Morbid obesity  3. Hypertension  4. HERBERT on CPAP  5.  Hyperlipidemia      PLAN:   Admit to: Telemetry  to Mahnaz Carter DO   Consult: General surgery and Wound Care  Vitals: Per protocol  Activity: Up with assist  Diet: Low carb, low sodium diet  IV fluids  IV antibiotics  Studies: CT Abdomen  Medications: Continue home meds with

## 2019-06-06 NOTE — PROGRESS NOTES
OT 1-3 times a week for therapeutic activity, ADL re-training, bed mobility, functional transfers, functional mobility, safety and fall prevention, balance and endurance activities, instruction in energy conservation principles, and patient/family education. Patient and/or family understands diagnosis, prognosis, education and plan of care. Pt/family verbalized understanding.      Time Code treatment minutes: [place] OTR/L #[id number]

## 2019-06-06 NOTE — PLAN OF CARE
Problem: Falls - Risk of:  Goal: Will remain free from falls  Description  Will remain free from falls  Outcome: Met This Shift     Problem: Risk for Impaired Skin Integrity  Goal: Tissue integrity - skin and mucous membranes  Description  Structural intactness and normal physiological function of skin and  mucous membranes.   Outcome: Not Met This Shift   Abdomen has open dranining area

## 2019-06-06 NOTE — PROGRESS NOTES
Pharmacy Consultation Note  (Antibiotic Dosing and Monitoring)    Initial consult date: 6/6/19  Consulting physician: Dr. Shefali Montes  Drug(s): Vancomycin  Indication: Necrotic wound of the pannus    Ht Readings from Last 1 Encounters:   06/05/19 5' 10.5\" (1.791 m)     Wt Readings from Last 1 Encounters:   06/06/19 (!) 397 lb (180.1 kg)     Age/  Gender IBW DW  Allergy Information   38 y.o.   male 74.2 kg 116.6 kg  Cats claw (uncaria tomentosa)          Date  Tmax WBC BUN/CR UOP  (mL/kg/hr) Drug/Dose Time   Given Level(s)   (Time) Comments   6/5  (#1) afebrile 6 14/0.6 Incomplete Vancomycin 2000 mg IV x 1 2135     6/6  (#2) afebrile 5 10/0.5 \" Vancomycin 2000 mg IV q8hr <1230>  <2030>     6/7  (#3)             (#4)             (#5)             (#6)             (#7)             Estimated Creatinine Clearance: 314 mL/min (A) (based on SCr of 0.5 mg/dL (L)). UOP over the past 24 hours:       Intake/Output Summary (Last 24 hours) at 6/6/2019 1123  Last data filed at 6/6/2019 0800  Gross per 24 hour   Intake 480 ml   Output --   Net 480 ml     Other anti-infectives: Anti-infective Dose Date Initiated Date Stopped   Pip/tazo 3.375g IV q8hr 6/6      Cultures:  available culture and sensitivity results were reviewed in EPIC  Cultures sent and are pending. Culture Date Result    Blood cx 1 6/5    Blood cx 2 6/5    Urine cx 6/5    Wound cx                 Assessment:  · Consulted by Dr. Shefali Montes to dose/monitor vancomycin  · Goal trough level:  15-20 mcg/mL  · Pt is a 38 yoM who presented from home with necrotizing infection of the pannus, for I&D 6/7.    · Serum creatinine today: 0.5 mg/dL; CrCl > 120 mL/min; baseline Scr ~ 0.5-0.6 mg/dL    Plan:  · Vancomycin 2000 mg IV q8hr  · Level prior to 4th dose  · Follow renal function  · Pharmacist will follow and monitor/adjust dosing as necessary      Thank you for the consult,    Thao Jorgensen PharmD, BCPS 6/6/2019 11:23 AM  Pager: [phone]  Ext: [phone]

## 2019-06-06 NOTE — CONSULTS
General Surgery Consult    Patient's Name/Date of Birth: Vicente Suarez / 1975    Date: June 6, 2019     Consulting Surgeon: Fabián Astorga M.D.    PCP: Petrona Colmenares DO     Chief Complaint: infected pannus    HPI:   Vicente Suarez is a 38 y.o. male who presents for  evaluation of infected pannus with necrosis.  Has been present for days and has little pain      Past Medical History:   Diagnosis Date    Asthma     Hypertension     Movement disorder     ms    MS (multiple sclerosis) ([facility] [address]     Psychiatric problem     depression        Past Surgical History:   Procedure Laterality Date    ECHO COMPL W DOP COLOR FLOW  5/8/2012         ECHO COMPL W DOP COLOR FLOW  10/29/2013         KNEE SURGERY      lt       Current Facility-Administered Medications   Medication Dose Route Frequency Provider Last Rate Last Dose    0.9 % sodium chloride infusion   Intravenous Continuous Karlis Ebbing, DO        sodium chloride flush 0.9 % injection 10 mL  10 mL Intravenous 2 times per day Karlis Ebbing, DO   10 mL at 06/06/19 4083    sodium chloride flush 0.9 % injection 10 mL  10 mL Intravenous PRN Karlis Ebbing, DO        magnesium hydroxide (MILK OF MAGNESIA) 400 MG/5ML suspension 30 mL  30 mL Oral Daily PRN Karlis Ebbing, DO        ondansetron [facility]) injection 4 mg  4 mg Intravenous Q6H PRN Karlis Ebbing, DO        enoxaparin (LOVENOX) injection 40 mg  40 mg Subcutaneous Daily Karlis Ebbing, DO   40 mg at 06/06/19 0827    famotidine (PEPCID) injection 20 mg  20 mg Intravenous BID Karlis Ebbing, DO   20 mg at 06/06/19 3504    acetaminophen (TYLENOL) tablet 650 mg  650 mg Oral Q4H PRN Karlis Ebbing, DO        PARoxetine (PAXIL) tablet 20 mg  20 mg Oral Daily Karlis Ebbing, DO   20 mg at 06/06/19 0827    metoprolol tartrate (LOPRESSOR) tablet 50 mg  50 mg Oral BID Karlis Ebbing, DO   50 mg at 06/06/19 0827    lisinopril (PRINIVIL;ZESTRIL) tablet 40 mg  40 mg Oral Daily Wynette Balling, DO   40 mg at 19 0827    hydrochlorothiazide (HYDRODIURIL) tablet 25 mg  25 mg Oral QAM Wynette Balling, DO   25 mg at 19 0827    mometasone-formoterol (DULERA) 200-5 MCG/ACT inhaler 2 puff  2 puff Inhalation BID Wynette Balling, DO   2 puff at 19 1202       Allergies   Allergen Reactions    Cats Claw (Uncaria Tomentosa)        The patient has a family history that is negative for severe cardiovascular or respiratory issues, negative for reaction to anesthesia. Social History     Socioeconomic History    Marital status:      Spouse name: Not on file    Number of children: Not on file    Years of education: Not on file    Highest education level: Not on file   Occupational History    Not on file   Social Needs    Financial resource strain: Not on file    Food insecurity:     Worry: Not on file     Inability: Not on file    Transportation needs:     Medical: Not on file     Non-medical: Not on file   Tobacco Use    Smoking status: Former Smoker     Packs/day: 0.30     Years: 12.00     Pack years: 3.60     Types: Cigarettes     Last attempt to quit: 2011     Years since quittin.5    Smokeless tobacco: Former User     Quit date: 2011   Substance and Sexual Activity    Alcohol use:  Yes     Alcohol/week: 10.8 oz     Types: 12 Cans of beer, 6 Shots of liquor per week     Comment: moderate    Drug use: No    Sexual activity: Yes     Partners: Female   Lifestyle    Physical activity:     Days per week: Not on file     Minutes per session: Not on file    Stress: Not on file   Relationships    Social connections:     Talks on phone: Not on file     Gets together: Not on file     Attends [employer] witness service: Not on file     Active member of club or organization: Not on file     Attends meetings of clubs or organizations: Not on file     Relationship status: Not on file    Intimate partner violence:     Fear of current or ex partner: Not on file     Emotionally abused: Not on file     Physically abused: Not on file     Forced sexual activity: Not on file   Other Topics Concern    Not on file   Social History Narrative    Not on file           Review of Systems  General ROS: negative for - chills, fatigue or fever  ENT ROS: negative for - headaches, hearing change or nasal congestion  Endocrine ROS: negative for - breast changes or galactorrhea, no polyuria  Respiratory ROS: negative for - hemoptysis, orthopnea or pleuritic pain  Cardiovascular ROS: negative for - dyspnea on exertion, edema or loss of consciousness  Gastrointestinal ROS: negative for - blood in stools, heartburn, hematemesis or melena  : no polyuria, no dysuria  Musculoskeletal ROS: negative for - gait disturbance  Dermatological ROS: negative for - acne, dry skin or eczema  Neuro ROS: no recent memory loss or mood swings. Physical exam:   BP (!) 117/55   Pulse 88   Temp 97.9 °F (36.6 °C) (Oral)   Resp 18   Ht 5' 10.5\" (1.791 m)   Wt (!) 397 lb (180.1 kg)   SpO2 98%   BMI 56.16 kg/m²   General appearance:  NAD  Head: NCAT, PERRLA, EOMI, red conjunctiva  Neck: supple, no masses  Lungs: CTAB, equal chest rise bilateral  Heart: Reg rate  Abdomen: soft, nontender, nondistended,  Skin; necrotic lesion of pannus  Gu: no cva tenderness  Extremities: extremities normal, atraumatic, no cyanosis or edema    Assessment:  38 y.o. male with necrotic wound of pannus    Plan:     Will debride in MD Angel  6/6/2019  8:57 AM

## 2019-06-06 NOTE — PROGRESS NOTES
Physical Therapy    Room #:  9418/4593-76  Patient Name: Claudene Jules    PHYSICAL THERAPY INITIAL EVALUATION    Tentative placement recommendation: Home  Equipment recommendation: None      Plan of care: Patient will be seen   daily  for therapeutic exercise, functional retraining, endurance activities, balance exercises, family and patient education. Nursing to ambulate patient in hallway; order entered. AM-PAC Basic Mobility       AM-[facility] Mobility Inpatient   How much difficulty turning over in bed?: A Little  How much difficulty sitting down on / standing up from a chair with arms?: A Little  How much difficulty moving from lying on back to sitting on side of bed?: A Little  How much help from another person moving to and from a bed to a chair?: A Little  How much help from another person needed to walk in hospital room?: A Little  How much help from another person for climbing 3-5 steps with a railing?: A Lot  AM-PAC Inpatient Mobility Raw Score : 17  AM-PAC Inpatient T-Scale Score : 42.13  Mobility Inpatient CMS 0-100% Score: 50.57  Mobility Inpatient CMS G-Code Modifier : CK    Order:  EVALUATE AND TREAT   Diagnosis/Problem list: Patient's abdomen is leaking blood  1.  Abdominal wall cellulitis        Patient Active Problem List   Diagnosis    Edema    Morbid obesity ([address]    Excessive sleepiness    Asthma, moderate persistent, poorly-controlled    Cellulitis of right leg    MS (multiple sclerosis) (HCC)    Chest pain    Fatigue    Numbness and tingling    HERBERT on CPAP    Anxiety    Myalgia    Abdominal wall cellulitis    Open wound of abdomen    Essential hypertension    Hypomagnesemia       Past medical history:       Diagnosis Date    Asthma     Hypertension     Movement disorder     ms    MS (multiple sclerosis) ([address]     Psychiatric problem     depression    ;      Procedure Laterality Date    ECHO COMPL W DOP COLOR FLOW  5/8/2012         ECHO COMPL W DOP COLOR FLOW 10/29/2013         KNEE SURGERY      lt       The admitting diagnosis and active problem list as listed above have been reviewed prior to the initiation of this evaluation. Last time out of bed: today, independently walked to the bathroom 10 minutes prior to PT    Precautions: falls, O2 and skin 5L  Social history: Patient lives alone  In a third story apartment  with elevator    Prior Level of Function: Patient ambulated independently   Equipment owned: O2,  Patient is on 5 Liters at home    Mentation: alert, cooperative, oriented x 3  and follows directions,     ROM: wfl   STRENGTH: 4/5 bilateral lower extremitites  PAIN: (measured on a visual analog scale with 0=no pain and 10=excruciating pain) 0/10. FUNCTIONAL ASSESSMENT   Bed Mobility- Supine to sit- Supervision          Scooting- Supervision       Sit to supine- Supervision      Transfers-Sit to stand- Supervision     Gait:  Patient ambulated 25 feet x2 using IV pole with Supervision     Steps:  Not assessed     Balance: sit-good       stand fair     Edema: yes - abdominal region  Endurance: fair     Treatment:   Therapist educated and facilitated patient on techniques to increase safety and independence with bed mobility, balance, functional transfers, and functional mobility. Sat EOB x 10 minutes to increase dynamic sitting balance and activity tolerance. Patient demonstrating good understanding of education/techniques, requiring additional training/education. At end of session, patient in bed with  call light and phone within reach,  all lines and tubes intact, nursing notified. Pt would benefit from continued skilled PT to increase functional independence and quality of life. Rehab Potential: good  for baseline  Patients Goal: home    ASSESSMENT  Patient exhibits decreased strength, balance, coordination impairing functional mobility. GOALS to be met in 1 days.    Bed mobility-  Independent       Transfers-Sit to stand-Independent    Gait:  Patient to ambulate 100 feet using no device with Independent. Verbal cues for decreased speed while ambulating in room. Steps: Patient to go up and down 3  step(s) using 2 rail(s) with  Independent     Increase balance to allow for improvement towards functional goals. Increase endurance to allow for improvement towards functional goals.      20 min eval  Gait Training  Grayson Busby SPT

## 2019-06-06 NOTE — PROGRESS NOTES
Nurse to nurse given to the third floor. Waiting on call back from Dr. Melo Castro on wound culture orders.

## 2019-06-06 NOTE — ED PROVIDER NOTES
--------------------------------------------- PAST HISTORY ---------------------------------------------  Past Medical History:  has a past medical history of Asthma, Hypertension, Movement disorder, MS (multiple sclerosis) ([facility] [address] and Psychiatric problem. Past Surgical History:  has a past surgical history that includes knee surgery; ECHO Compl W Dop Color Flow (5/8/2012); and ECHO Compl W Dop Color Flow (10/29/2013). Social History:  reports that he quit smoking about 7 years ago. His smoking use included cigarettes. He has a 3.60 pack-year smoking history. He quit smokeless tobacco use about 7 years ago. He reports that he drinks about 10.8 oz of alcohol per week. He reports that he does not use drugs. Family History: family history includes Arthritis in his maternal grandmother; Asthma in his father; Cancer in his maternal aunt, maternal uncle, and paternal grandmother; Depression in his mother; Osteoporosis in his mother. The patients home medications have been reviewed.     Allergies: Cats claw (uncaria tomentosa)    -------------------------------------------------- RESULTS -------------------------------------------------    LABS:  Results for orders placed or performed during the hospital encounter of 06/05/19   CBC   Result Value Ref Range    WBC 6.0 4.5 - 11.5 E9/L    RBC 3.26 (L) 3.80 - 5.80 E12/L    Hemoglobin 12.7 12.5 - 16.5 g/dL    Hematocrit 35.3 (L) 37.0 - 54.0 %    .3 (H) 80.0 - 99.9 fL    MCH 39.0 (H) 26.0 - 35.0 pg    MCHC 36.0 (H) 32.0 - 34.5 %    RDW 13.4 11.5 - 15.0 fL    Platelets 884 163 - 836 E9/L    MPV 9.4 7.0 - 12.0 fL   Comprehensive Metabolic Panel w/ Reflex to MG   Result Value Ref Range    Sodium 131 (L) 132 - 146 mmol/L    Potassium reflex Magnesium 4.3 3.5 - 5.0 mmol/L    Chloride 89 (L) 98 - 107 mmol/L    CO2 28 22 - 29 mmol/L    Anion Gap 14 7 - 16 mmol/L    Glucose 113 (H) 74 - 99 mg/dL    BUN 14 6 - 20 mg/dL    CREATININE 0.6 (L) 0.7 - 1.2 mg/dL    GFR Non-African American >60 >=60 mL/min/1.73    GFR African American >60     Calcium 8.9 8.6 - 10.2 mg/dL    Total Protein 8.7 (H) 6.4 - 8.3 g/dL    Alb 3.4 (L) 3.5 - 5.2 g/dL    Total Bilirubin 1.1 0.0 - 1.2 mg/dL    Alkaline Phosphatase 127 40 - 129 U/L    ALT 19 0 - 40 U/L    AST 48 (H) 0 - 39 U/L   Lipase   Result Value Ref Range    Lipase 20 13 - 60 U/L   Troponin   Result Value Ref Range    Troponin 0.01 0.00 - 0.03 ng/mL   Lactic Acid, Plasma   Result Value Ref Range    Lactic Acid 1.6 0.5 - 2.2 mmol/L   Urinalysis   Result Value Ref Range    Color, UA Yellow Straw/Yellow    Clarity, UA Clear Clear    Glucose, Ur Negative Negative mg/dL    Bilirubin Urine Negative Negative    Ketones, Urine Negative Negative mg/dL    Specific Gravity, UA <=1.005 1.005 - 1.030    Blood, Urine Negative Negative    pH, UA 7.0 5.0 - 9.0    Protein, UA Negative Negative mg/dL    Urobilinogen, Urine 1.0 <2.0 E.U./dL    Nitrite, Urine Negative Negative    Leukocyte Esterase, Urine TRACE (A) Negative   Microscopic Urinalysis   Result Value Ref Range    WBC, UA 5-10 0 - 5 /HPF    RBC, UA NONE 0 - 2 /HPF    Epi Cells RARE /HPF    Bacteria, UA NONE /HPF       RADIOLOGY:  No orders to display           ------------------------- NURSING NOTES AND VITALS REVIEWED ---------------------------  Date / Time Roomed:  6/5/2019  5:12 PM  ED Bed Assignment:  [id number]    The nursing notes within the ED encounter and vital signs as below have been reviewed.      Patient Vitals for the past 24 hrs:   BP Temp Temp src Pulse Resp SpO2 Height Weight   06/05/19 2332 (!) 144/60 -- -- 88 -- -- -- --   06/05/19 2300 131/61 97.9 °F (36.6 °C) Oral 92 18 95 % -- --   06/05/19 2157 (!) 161/88 97.7 °F (36.5 °C) Oral 90 16 95 % -- --   06/05/19 2044 (!) 155/82 -- -- 96 24 97 % -- --   06/05/19 1933 130/68 -- -- 89 24 97 % -- --   06/05/19 1756 (!) 152/83 98.4 °F (36.9 °C) Oral 85 19 97 % 5' 10.5\" (1.791 m) (!) 397 lb (180.1 kg)       Oxygen Saturation Interpretation: Normal    ------------------------------------------ PROGRESS NOTES ------------------------------------------  Re-evaluation(s):  Patients symptoms show no change  Repeat physical examination is not changed    Counseling:  I have spoken with the patient and discussed todays results, in addition to providing specific details for the plan of care and counseling regarding the diagnosis and prognosis. Their questions are answered at this time and they are agreeable with the plan of admission.    --------------------------------- ADDITIONAL PROVIDER NOTES ---------------------------------  Consultations:  Spoke with Dr. Jose Hernández, [facility] Resident. Discussed case. They will admit the patient. This patient's ED course included: a personal history and physicial examination and re-evaluation prior to disposition    This patient has remained hemodynamically stable during their ED course. Diagnosis:  1. Abdominal wall cellulitis        Disposition:  Patient's disposition: Admit to med/surg floor  Patient's condition is stable.              Esauphilip FedTax, DO  06/05/19 0993

## 2019-06-06 NOTE — PLAN OF CARE
Problem: Risk for Impaired Skin Integrity  Goal: Tissue integrity - skin and mucous membranes  6/6/2019 1607 by Zoran Melchor RN  Outcome: Met This Shift  6/6/2019 0223 by Shannon Hernandez RN  Outcome: Not Met This Shift     Problem: Falls - Risk of:  Goal: Will remain free from falls  6/6/2019 1607 by Zoran Melchor RN  Outcome: Met This Shift  6/6/2019 0223 by Shannon Hernandez RN  Outcome: Met This Shift  Goal: Absence of physical injury  6/6/2019 1607 by Zoran Melchor RN  Outcome: Met This Shift  6/6/2019 0223 by Shannon Hernandez RN  Outcome: Met This Shift

## 2019-06-07 PROBLEM — D53.9 MACROCYTIC ANEMIA: Status: ACTIVE | Noted: 2019-01-01

## 2019-06-07 NOTE — PROGRESS NOTES
Attempted tx with pt, however pt currently OOR for I&D of abdominal abcess. Will attempt tx with pt at later time/date.  Justice German, [address]

## 2019-06-07 NOTE — H&P
Reviewed and no changes  BP (!) 147/72   Pulse 84   Temp 97.5 °F (36.4 °C) (Oral)   Resp 18   Ht 5' 10.5\" (1.791 m)   Wt (!) 397 lb (180.1 kg)   SpO2 97%   BMI 56.16 kg/m²   Linda Mejia MD

## 2019-06-07 NOTE — PROGRESS NOTES
Progress Note  Name: Trang Nowak  :  1975, (38 y.o.,male)   MR#:  [id number]  Admission Date:  2019  5:12 PM  Date of Service:  2019  Allergies:  Cats claw (uncaria tomentosa)    Hospital Day: 3  Subjective:   Patient was seen and examined at bedside this morning. No family is present during the examination. Patient remained afebrile overnight. He reports less discomfort and drainage from the abdominal wound. Patient anticipates surgical debridement this morning to be performed by Dr. Vicente Powers. Patient is wearing his CPAP from home. ROS  Review of Systems   Constitutional: Negative for activity change, appetite change, chills, fever and unexpected weight change. HENT: Negative for congestion, ear pain, hearing loss, rhinorrhea, sore throat and trouble swallowing. Eyes: Negative for photophobia, pain, discharge and visual disturbance. Respiratory: Negative for cough, choking, chest tightness, shortness of breath and wheezing. Cardiovascular: Negative for chest pain, palpitations and leg swelling. Gastrointestinal: Negative for abdominal distention, abdominal pain, blood in stool, constipation, diarrhea, nausea and vomiting. Endocrine: Negative for cold intolerance, heat intolerance, polydipsia and polyuria. Genitourinary: Negative for decreased urine volume, dysuria, flank pain, frequency, hematuria and urgency. Musculoskeletal: Negative for arthralgias, back pain, joint swelling, neck pain and neck stiffness. Skin: Positive for wound. Negative for pallor and rash. Neurological: Negative for dizziness, tremors, weakness, light-headedness, numbness and headaches. Hematological: Does not bruise/bleed easily. Psychiatric/Behavioral: Negative for confusion and suicidal ideas. The patient is not nervous/anxious.         Objective:   VITALS  Patient Vitals for the past 24 hrs:   BP Temp Temp src Pulse Resp SpO2   19 (!) 147/72 97.5 °F (36.4 °C) Oral 84 18 97 % 32.9 06/07/2019    RDW 13.3 06/07/2019     06/07/2019    MPV 9.2 06/07/2019     BMP:   Lab Results   Component Value Date     06/07/2019    K 3.6 06/07/2019    K 4.3 06/05/2019    CL 93 06/07/2019    CO2 29 06/07/2019    BUN 8 06/07/2019    LABALBU 2.9 06/07/2019    LABALBU 4.1 05/08/2012    CREATININE 0.5 06/07/2019    CALCIUM 8.6 06/07/2019    GFRAA >60 06/07/2019    LABGLOM >60 06/07/2019    GLUCOSE 121 06/07/2019    GLUCOSE 118 05/14/2012     Hepatic Function Panel:  @BRIEFLAB(ALKPHOS,AST,ALT,PROT,LABALBU,BILITOT)@    RADIOLOGY    Ct Abdomen Pelvis W Iv Contrast Additional Contrast? Oral    Result Date: 6/6/2019  LOCATION:200 EXAM: CT ABDOMEN PELVIS W IV CONTRAST COMPARISON: None HISTORY: History of abdominal wound TECHNIQUE:Contrast-enhanced helical abdomen and pelvis CT was performed. Coronal and sagittal reconstructions also obtained. Automated dose control was used for this exam. CONTRAST: 80 mL Isovue-370 intravenous contrast was administered. Oral contrast administered. FINDINGS: SUPPORT DEVICES: None LOWER THORAX: Limited evaluation of the lower chest demonstrates clear lung bases bilaterally. SOLID ORGANS: The liver, spleen, pancreas, and adrenal glands are normal. BILIARY SYSTEM: No evidence of biliary ductal dilatation. GENITOURINARY: The kidneys are normal in appearance bilaterally with no evidence of hydronephrosis. No stones are appreciated. The bladder is unremarkable. GASTROINTESTINAL: The stomach, small and large bowel are normal in caliber without evidence of focal wall thickening. There is no evidence of bowel obstruction. APPENDIX: Within normal limits without adjacent fat stranding. FLUID COLLECTIONS: None. LYMPH NODES: No adenopathy by size criteria. VASCULAR STRUCTURES: Visualized vascular structures appear normal. ABDOMINAL WALL: Fat-containing hernia seen involving the ventral lower mid abdomen with some fat stranding and fluid.  OSSEOUS AND SOFT TISSUE STRUCTURES: Bone windows demonstrate no suspicious osteolytic or osteoblastic lesions. No fracture identified. Probable incarcerated fat containing hernia in the ventral lower abdomen. Assessment/Plan: Hospital Day: 3   1. Open Necrotic abdominal wound with cellulitis  2. Morbid obesity  3. Hypertension  4. Hypomagnesemia  5. Macrocytic anemia  6. HERBERT on CPAP  7. Hyperlipidemia      PLAN:   Admit to: Telemetry  to Sean Lazaro DO   Appreciate consultation from General surgery (Js/Brandon) and Wound Care   Surgical debridement @ 9 am today  Vitals: Per protocol  Activity: Up with assist  Diet: NPO (Low carb, low sodium diet when resumed)  IV fluids  IV antibiotics  Replete Magnesium per protocol  Studies: CT Abdomen - probable incarcerated fat containing hernia in the ventral lower abdomen  Medications: Continue home meds with modifications  Labs: CBC, MMA & Homocysteine, CMP  wound culture - pending, blood culture - both pending, urine cultures - pending    GI/DVT prophylaxis    Please see orders for further care management. Disposition:  consulted for discharge planning.      Patient was seen and discussed during rounds with Attending Physician:  DO Juanita Ruiz DO PGY3   8:39 AM, 6/7/2019

## 2019-06-07 NOTE — FLOWSHEET NOTE
Inpatient Wound Care(Initial Consult)340-02    Admit Date: 6/5/2019  5:12 PM    Reason for consult:  Abdominal wounds    Significant history:  Admitted for wounds  past medical history of Asthma, Hypertension, Movement disorder, MS (multiple sclerosis) ([address] and Psychiatric problem. Findings:       06/07/19 0811   Wound 06/05/19 Abdomen Mid   Date First Assessed/Time First Assessed: 06/05/19 2308   Present on Hospital Admission: Yes  Location: Abdomen  Wound Location Orientation: Mid   Wound Image    Wound Length (cm) 8 cm   Wound Width (cm) 3.5 cm   Wound Depth (cm) 1 cm   Wound Surface Area (cm^2) 28 cm^2   Change in Wound Size % (l*w) 30   Wound Volume (cm^3) 28 cm^3   Wound Healing % -40   Wound Assessment Black   Drainage Amount Scant   Drainage Description Serosanguinous; Yellow   Odor Strong   Yoselin-wound Assessment Other (Comment)  (jaylan)   Black%Wound Bed 100   Wound 06/07/19 Abdomen Left; Lower   Date First Assessed/Time First Assessed: 06/07/19 0811   Present on Hospital Admission: Yes  Location: Abdomen  Wound Location Orientation: Left; Lower   Wound Image    Wound Length (cm) 3.3 cm   Wound Width (cm) 1.5 cm   Wound Depth (cm) 1.5 cm   Wound Surface Area (cm^2) 4.95 cm^2   Wound Volume (cm^3) 7.42 cm^3   Wound Assessment Black   Drainage Amount Small   Drainage Description Serosanguinous   Odor Strong   Black%Wound Bed 100     **Informed Consent**    The patient has given verbal consent to have photos taken of mid abdominal wounds and inserted into their chart as part of their permanent medical record for purposes of documentation, treatment management and/or medical review. All Images taken on 6/7/19 of patient name: Pennye Soulier were transmitted and stored on secured [employer] located within [employer] Tab by a registered [employer] Mobile Application Device.        Impression:    Necrotic wounds      Plan:  Scheduled for debridement today by Dr. Des Ascencio  Bariatric bed surface offered

## 2019-06-07 NOTE — OP NOTE
DATE OF PROCEDURE: 6/7/2019   SURGEON: RANDOLPH Valladares: none. PREOPERATIVE DIAGNOSIS: abdominal pannus wound x 2, 13x5 and 2x2 cm in size each. POSTOPERATIVE DIAGNOSIS: same. OPERATION: Excisional debridement of 100% of  Skin and subcutaneous tissues abdominal pannus wound x 2, 13x5 and 2x2 cm in size each. ANESTHESIA: LMAC and local.   ESTIMATED BLOOD LOSS: Minimal.   COMPLICATIONS: None. FLUIDS: Crystalloid. SPECIMEN: tissue culture. PROCEDURE: The patient was brought into the operative suite and was placed   under [facility] anesthesia ; was infused with local anesthetic after being prepped   and draped in a normal sterile condition. Once this was done, the necrotic tissue was debrided sharply until viable tissue was seen, this was confirmed by capillary oozing. The wound was sterilely irrigated and hemostasis was adequate.

## 2019-06-07 NOTE — PLAN OF CARE
Problem: Increased energy expenditure (NI-1.2)  Goal: Food and/or Nutrient Delivery  Description  Individualized approach for food/nutrient provision.   Outcome: Met This Shift

## 2019-06-07 NOTE — ANESTHESIA POSTPROCEDURE EVALUATION
Department of Anesthesiology  Postprocedure Note    Patient: Domingo Guy  MRN: [id number]  YOB: 1975  Date of evaluation: 6/7/2019  Time:  10:25 AM     Procedure Summary     Date:  06/07/19 Room / Location:  [address]    Anesthesia Start:  0915 Anesthesia Stop:  5251    Procedure:  EXCISIONAL DEBRIDEMENT OF ABDOMINAL WOUND (N/A Abdomen) Diagnosis:  (ABDOMINAL WOUND)    Surgeon:  Haydee Stevens MD Responsible Provider:  Latonia Gill MD    Anesthesia Type:  MAC ASA Status:  3          Anesthesia Type: MAC    Wilver Phase I: Wilver Score: 9    Wilver Phase II:      Last vitals: Reviewed and per EMR flowsheets.        Anesthesia Post Evaluation    Patient location during evaluation: PACU  Patient participation: complete - patient participated  Level of consciousness: awake and alert  Pain score: 3  Airway patency: patent  Nausea & Vomiting: no nausea  Complications: no  Cardiovascular status: blood pressure returned to baseline  Respiratory status: acceptable  Hydration status: euvolemic

## 2019-06-07 NOTE — PROGRESS NOTES
Nutrition Assessment    Type and Reason for Visit: Initial, Consult, Positive Nutrition Screen    Nutrition Recommendations: Continue current diet, Start ONS (Axel BID, pro mod BID). Will monitor glucose to see if Diabetic edit for meals should be initiated. Nutrition Assessment: Pt adequately nourished on admit, however pt w/ increased nutrient needs 2/2 wounds s/p I&D 6/7. Pt agreeable to Axel BID, pro mod BID. Malnutrition Assessment:  · Malnutrition Status: At risk for malnutrition  · Context: Chronic illness  · Findings of the 6 clinical characteristics of malnutrition (Minimum of 2 out of 6 clinical characteristics is required to make the diagnosis of moderate or severe Protein Calorie Malnutrition based on AND/ASPEN Guidelines):  1. Energy Intake-Less than or equal to 50% of estimated energy requirement, (x 2 days)    2. Weight Loss-Unable to assess(d/t fluid), unable to assess  3. Fat Loss-No significant subcutaneous fat loss   4. Muscle Loss-No significant muscle mass loss   5. Fluid Accumulation-Unable to assess   6.  Strength-Not measured    Nutrition Risk Level:  Moderate    Nutrient Needs:  · Estimated Daily Total Kcal: 8199-3129(34-56 kcal/kg CBW)  · Estimated Daily Protein (g): 150-165(2-2.2 g pro/kg IBW)  · Estimated Daily Total Fluid (ml/day): 7563-1435(1ml/kcal)    Nutrition Diagnosis:   · Problem: Increased nutrient needs  · Etiology: related to Increased demand for energy/nutrients     Signs and symptoms:  as evidenced by Presence of wounds    Objective Information:  · Nutrition-Focused Physical Findings: abd rounded, active BS, +1-2 edema, +I/O, glucose 121  · Wound Type: Surgical Wound(abd wound)  · Current Nutrition Therapies:  · Oral Diet Orders: General   · Oral Diet intake: 26-50%  · Anthropometric Measures:  · Ht: 5' 10.5\" (179.1 cm)   · Current Body Wt: 395 lb (179.2 kg)(6/7 Bedscale)  · Admission Body Wt: 397 lb (180.1 kg)(6/5 actual/bedscale)  · Usual Body Wt: 397 lb (180.1 kg)(3/29 bedscale)  · % Weight Change:  RAMIREZ d/t fluid. Pt wt appears stable however pt w/ chronic wt fluctuations associated w/ fluid as per d/w pt.    · Ideal Body Wt: 166 lb (75.3 kg), % Ideal Body 237%  · BMI Classification: BMI > or equal to 40.0 Obese Class III    Nutrition Interventions:   Continued Inpatient Monitoring, Coordination of Care    Nutrition Evaluation:   · Evaluation: Goals set   · Goals: intakes > 75% meal tray/ONS    · Monitoring: Meal Intake, Supplement Intake, Diet Tolerance, Skin Integrity, Wound Healing, I&O, Weight, Pertinent Labs, Monitor Bowel Function      Electronically signed by Cata De La Cruz, MS, RD, LD on 6/7/19 at 12:29 PM    Contact Number: [phone]

## 2019-06-07 NOTE — CARE COORDINATION
SS Note/Discharge plan:  Notified by Jill Restrepo [facility] that their agency also will not have staffing to accept consult, referral made to Amanda at [facility], agency does accept referral but will need to do chart review and will follow with melina Wyman in am to confirm if they will accept referral.Electronically signed by HELENA Hart on 6/7/2019 at 5:05 PM

## 2019-06-07 NOTE — PROGRESS NOTES
function  · Pharmacist will follow and monitor/adjust dosing as necessary      Thank you for the consult,    Aline Butler PharmD, BCPS 6/7/2019 10:44 AM  Pager: [phone]  Ext: [phone]

## 2019-06-07 NOTE — ANESTHESIA PRE PROCEDURE
Department of Anesthesiology  Preprocedure Note       Name:  Cele Hickman   Age:  38 y.o.  :  1975                                          MRN:  [id number]         Date:  2019      Surgeon: Polly Davye):  Kathy Parks MD    Procedure: EXCISIONAL DEBRIDEMENT OF ABDOMINAL WOUND (N/A )    Medications prior to admission:   Prior to Admission medications    Medication Sig Start Date End Date Taking? Authorizing Provider   hydrochlorothiazide (HYDRODIURIL) 25 MG tablet Take 1 tablet by mouth every morning 19  Yes Donnell Parker, DO   albuterol sulfate HFA (VENTOLIN HFA) 108 (90 Base) MCG/ACT inhaler Inhale 2 puffs into the lungs every 6 hours as needed for Wheezing 4/15/19  Yes Donnell Parker, DO   metoprolol tartrate (LOPRESSOR) 50 MG tablet Take 1 tablet by mouth 2 times daily  Patient taking differently: Take 50 mg by mouth daily  4/15/19  Yes Donnell Parker, DO   PARoxetine (PAXIL) 20 MG tablet Take 1 tablet by mouth daily 3/29/19  Yes Donnell Parker, DO   fluticasone-salmeterol (ADVAIR DISKUS) 250-50 MCG/DOSE AEPB Inhale 1 puff into the lungs 2 times daily 3/29/19  Yes oDnnell Parker, DO   omeprazole (PRILOSEC) 20 MG delayed release capsule Take 1 capsule by mouth Daily 3/29/19 6/27/19 Yes Donnell Parker, DO   ibuprofen (ADVIL;MOTRIN) 600 MG tablet Take 1 tablet by mouth every 6 hours as needed for Pain 18  Yes Donnell Parker, DO   gabapentin (NEURONTIN) 400 MG capsule Take 1 capsule by mouth 4 times daily 8/7/15  Yes Cristine Kocher., MD   lisinopril (PRINIVIL;ZESTRIL) 20 MG tablet Take 2 tablets by mouth daily 19   Donnell Parker, DO   dimethyl Fumarate (TECFIDERA) 240 MG delayed release capsule Take 1 capsule by mouth 2 times daily 8/7/15   Cristine Kocher., MD   OXYGEN Inhale 4 L into the lungs     Historical Provider, MD   Respiratory Therapy Supplies WINSTON by Does not apply route.  13   Diego Fine DO       Current medications:    Current Facility-Administered puff  2 puff Inhalation BID Nancy Boss, DO   2 puff at 19 6347       Allergies: Allergies   Allergen Reactions    Cats Claw (Uncaria Tomentosa)        Problem List:    Patient Active Problem List   Diagnosis Code    Edema R60.9    Morbid obesity ([facility] E66.01    Excessive sleepiness G47.10    Asthma, moderate persistent, poorly-controlled J45.40    Cellulitis of right leg L03.115    MS (multiple sclerosis) ([facility] G35    Chest pain R07.9    Fatigue R53.83    Numbness and tingling R20.0, R20.2    HERBERT on CPAP G47.33, Z99.89    Anxiety F41.9    Myalgia M79.10    Abdominal wall cellulitis L03.311    Open wound of abdomen S31.109A    Essential hypertension I10    Hypomagnesemia E83.42    Macrocytic anemia D53.9       Past Medical History:        Diagnosis Date    Asthma     Hypertension     Movement disorder     ms    MS [facility]) ([facility]     Psychiatric problem     depression        Past Surgical History:        Procedure Laterality Date    ECHO COMPL W DOP COLOR FLOW  2012         ECHO COMPL W DOP COLOR FLOW  10/29/2013         KNEE SURGERY      lt       Social History:    Social History     Tobacco Use    Smoking status: Former Smoker     Packs/day: 0.30     Years: 12.00     Pack years: 3.60     Types: Cigarettes     Last attempt to quit: 2011     Years since quittin.5    Smokeless tobacco: Former User     Quit date: 2011   Substance Use Topics    Alcohol use:  Yes     Alcohol/week: 10.8 oz     Types: 12 Cans of beer, 6 Shots of liquor per week     Comment: moderate                                Counseling given: Not Answered      Vital Signs (Current):   Vitals:    19 0708 19 0800 19 2000 19 0856   BP:  (!) 117/55 (!) 147/72 (!) 144/78   Pulse:  88 84 88   Resp:  18 18    Temp:  97.9 °F (36.6 °C) 97.5 °F (36.4 °C)    TempSrc:  Oral Oral    SpO2:  98% 97%    Weight: (!) 397 lb (180.1 kg)      Height: BP Readings from Last 3 Encounters:   06/07/19 (!) 144/78   05/20/19 (!) 158/76   03/29/19 (!) 156/98       NPO Status:                                                                                 BMI:   Wt Readings from Last 3 Encounters:   06/06/19 (!) 397 lb (180.1 kg)   05/20/19 (!) 407 lb 3.2 oz (184.7 kg)   03/29/19 (!) 397 lb 6.4 oz (180.3 kg)     Body mass index is 56.16 kg/m². CBC:   Lab Results   Component Value Date    WBC 4.1 06/07/2019    RBC 3.33 06/07/2019    HGB 11.8 06/07/2019    HCT 35.9 06/07/2019    .8 06/07/2019    RDW 13.3 06/07/2019     06/07/2019       CMP:   Lab Results   Component Value Date     06/07/2019    K 3.6 06/07/2019    K 4.3 06/05/2019    CL 93 06/07/2019    CO2 29 06/07/2019    BUN 8 06/07/2019    CREATININE 0.5 06/07/2019    GFRAA >60 06/07/2019    LABGLOM >60 06/07/2019    GLUCOSE 121 06/07/2019    GLUCOSE 118 05/14/2012    PROT 7.4 06/07/2019    CALCIUM 8.6 06/07/2019    BILITOT 0.8 06/07/2019    ALKPHOS 103 06/07/2019    AST 52 06/07/2019    ALT 20 06/07/2019       POC Tests: No results for input(s): POCGLU, POCNA, POCK, POCCL, POCBUN, POCHEMO, POCHCT in the last 72 hours.     Coags:   Lab Results   Component Value Date    PROTIME 12.2 05/07/2012    INR 1.0 05/07/2012       HCG (If Applicable): No results found for: PREGTESTUR, PREGSERUM, HCG, HCGQUANT     ABGs:   Lab Results   Component Value Date    S0ZUVPHG 95.1 05/07/2012        Type & Screen (If Applicable):  No results found for: MASOUD [facility]    Anesthesia Evaluation  Patient summary reviewed  Airway: Mallampati: III  TM distance: >3 FB   Neck ROM: full  Mouth opening: > = 3 FB Dental: normal exam         Pulmonary: breath sounds clear to auscultation  (+) sleep apnea: on CPAP,  asthma:                            Cardiovascular:    (+) hypertension:,         Rhythm: regular  Rate: normal           Beta Blocker:  Dose within 24 Hrs         Neuro/Psych:   (+) psychiatric history:depression/anxiety             GI/Hepatic/Renal: Neg GI/Hepatic/Renal ROS  (+) GERD:,           Endo/Other: Negative Endo/Other ROS                    Abdominal:   (+) obese,         Vascular:                                        Anesthesia Plan      MAC     ASA 3       Induction: intravenous. Anesthetic plan and risks discussed with patient. Plan discussed with CRNA.                   Negro Serna MD   6/7/2019

## 2019-06-07 NOTE — PROGRESS NOTES
Physical Therapy  Daily Treatment Note  6/7/2019  3055/1831-52                      Vinny Farah   [id number]                              1975    Patient Active Problem List   Diagnosis    Edema    [facility] ([facility]    Excessive sleepiness    Asthma, moderate persistent, poorly-controlled    Cellulitis of right leg    MS (multiple sclerosis) (HCC)    Chest pain    Fatigue    Numbness and tingling    HERBERT on CPAP    Anxiety    Myalgia    Abdominal wall cellulitis    Open wound of abdomen    Essential hypertension    Hypomagnesemia    Macrocytic anemia       Recommendation for discharge: home  Equipment prescriptions needed: none    AM-PAC Mobility Inpatient   How much difficulty turning over in bed?: A Little  How much difficulty sitting down on / standing up from a chair with arms?: A Little  How much difficulty moving from lying on back to sitting on side of bed?: A Little  How much help from another person moving to and from a bed to a chair?: A Little  How much help from another person needed to walk in hospital room?: A Little  How much help from another person for climbing 3-5 steps with a railing?: A Little  AM-[facility] Inpatient Mobility Raw Score : 18  AM-PAC Inpatient T-Scale Score : 43.63  Mobility Inpatient CMS 0-100% Score: 46.58  Mobility Inpatient CMS G-Code Modifier : CK      Precautions: falls, , O2 and skin, medium falls risk         S: Patient cleared by nursing for treatment. Patient is agreeable to treatment. Pain status: (measured on a visual analog scale with 0=no pain and 10=excruciating pain) /10. O: Pt was instructed in and performed the following:   Bed Mobility- Supine to sit-Supervision     Scooting- Supervision     Sit to supine-Supervision   Transfers-sit to stand- Supervision     Gait:  Patient ambulated 100 ft x 2  feet using no device pushing iv pole with Supervision.   Comments: 02, cues for decreased pace    Steps:   not assessed    Treatment: as above  Comment: Call light left by patient. Family present and supportive   A: Pt with increased yoselyn to activity, motivated to progress   P: Continue with physical therapy   [employer], PTA    Patient exhibits decreased strength, balance, coordination impairing functional mobility.     GOALS to be met in 1 days. Bed mobility-  Independent                                         Transfers-Sit to stand-Independent               Gait:  Patient to ambulate 100 feet using no device with Independent. Verbal cues for decreased speed while ambulating in room. Steps: Patient to go up and down 3  step(s) using 2 rail(s) with  Independent      Increase balance to allow for improvement towards functional goals. Increase endurance to allow for improvement towards functional goals.

## 2019-06-07 NOTE — CARE COORDINATION
[address] Transitions Interview     2019    Patient: Cele Hickman Patient : 1975   MRN: [id number]  Reason for Admission: Abdominal wall abscess and s/p excisional debridement of abdominal wound  RARS: Readmission Risk Score: 8         Spoke with: Alexis Fierro (Patient)      Readmission Risk  Patient Active Problem List   Diagnosis    Edema    Morbid obesity ([address]    Excessive sleepiness    Asthma, moderate persistent, poorly-controlled    Cellulitis of right leg    [facility]multiple sclerosis) ([facility])    Chest pain    Fatigue    Numbness and tingling    HERBERT on CPAP    Anxiety    Myalgia    Abdominal wall cellulitis    Open wound of abdomen    Essential hypertension    Hypomagnesemia    Macrocytic anemia       Inpatient Assessment  Care Transitions Summary    Care Transitions Inpatient Review  Medication Review  Are you able to afford your medications?:  Yes  How often do you have difficulty taking your medications?:  I always take them as prescribed. Housing Review  Who do you live with?:  Alone  Are you an active caregiver in your home?:  No  Social Support  Do you have a ?:  Yes   Name:  Lauren Pena (Nurse) from [employer]   Contact Info:  unknown  Do you have a [address] [place] [address]?:  No  Durable Medical Equipment  Patient Home Equipment:  CPAP, Oxygen  Functional Review  Ability to seek help/take action for Emergent/Urgent situations i.e. fire, crime, inclement weather or health crisis. :  Independent  Ability handle personal hygiene needs (bathing/dressing/grooming): Independent  Ability to manage medications: Independent  Ability to prepare food:  Independent  Ability to maintain home (clean home, laundry): Independent  Ability to drive and/or has transportation:  Dependent  Ability to do shopping:  Needs Assistance  Ability to manage finances:   Independent  Is patient able to live independently?:  Yes  Hearing and Vision  Visual

## 2019-06-07 NOTE — PROGRESS NOTES
Occupational Therapy  O.T. Bedside Treatment Note    Date:2019  Patient Name: Lisa Toro  MRN: [id number]  : 1975  ROOM #: [id number]    Problem list / Diagnosis:   Patient Active Problem List   Diagnosis    Edema    [facility]bid [facility] ([facility]    Excessive sleepiness    Asthma, moderate persistent, poorly-controlled    Cellulitis of right leg    [facility]    Chest pain    Fatigue    Numbness and tingling    HERBERT on CPAP    Anxiety    Myalgia    Abdominal wall cellulitis    Open wound of abdomen    Essential hypertension    Hypomagnesemia    Macrocytic anemia     Past Medical History:   Past Medical History:   Diagnosis Date    Asthma     Hypertension     Movement disorder     ms    MS (multiple sclerosis) ([facility]     Psychiatric problem     depression      Onset/Medical history: medical history reviewed  Past medical history: reviewed    The admitting diagnosis and active problem list as listed above have been reviewed prior to treatment. Nursing cleared the patient for treatment. Patient is agreeable to treatment. Discharge recommendations: Home with no skilled occupational therapy needed after discharge from  setting  Equipment prescriptions needed:   TBD    AM - [facility] Daily Activity Inpatient     AM-PAC Daily Activity Inpatient   How much help for putting on and taking off regular lower body clothing?: Total  How much help for Bathing?: A Lot  How much help for Toileting?: A Little  How much help for putting on and taking off regular upper body clothing?: A Little  How much help for taking care of personal grooming?: A Little  How much help for eating meals?: None  AM-[facility] Inpatient Daily Activity Raw Score: 16  AM-PAC Inpatient ADL T-Scale Score : 35.96  ADL Inpatient CMS 0-100% Score: 53.32  ADL Inpatient CMS G-Code Modifier : CK    Precautions: Falls, 4L O2, MS    S:  - Pt cleared for treatment through nursing. Nsg approved in bed activities only.   - Pain: pt c/o pain at sx site (abdominal I and D)  - Pt pleasant and cooperative during session. O:  - BUE strengthening exercises: 25 reps in all planes of movement with mod resist theraband to increase/maintain strength required for functional transfers/ADL participation. Exercises completed in shoulder and elbow flexion/extension, internal/external rotation and abduction/adduction. Fair activity tolerance noted during ex's. Ex's completed when seated in bed with HOB elevated. Function Assessment     Status  Goal Comment   Feeding:  Independent   Independent    to bring cup to mouth to rinse after oral hygiene   Grooming:  Supervision  Independent    pt able to manage containers and complete oral hygiene when seated in bed with HOB elevated   UE dressing:  Supervision  Independent   N/T -Per last report     LE dressing:  Dependent  Modified Alexis  N/T -Per last report     Bathing: Moderate Assist  Independent   N/T -Per last report     Bed Mobility:       Supervision  for supine to sit,   Supervision  for scooting,  Supervision  for sit to supine  Independent   N/T - Per last report ; nsg preferred pt remain in bed during tx 2* to just return from sx     Functional Transfers:    Supervision  for sit to stand transfer from EOB Independent  Safety: good   N/T -Per last report     Functional Mobility: 50 feet with IV pole and supervision  Independent   N/T -Per last report            A:  -Balance: Sitting: fair  in bed with HOB elevated for strengthening ex's, grooming       Standing: N/T d/t sx  -Endurance: fair  -Edema: none noted  -Safety:fair (VC's for follow through)  - Pt/family education/training: BUE strengthening ex's, ECT's,  grooming, ADL retraining, self-feeding  -Pt would benefit from additional ADLs, safety education, balance activities, transfer training, strength exercises, and over all endurance building.     P:  - Plan of care: Patient will be seen by OT 1-3x/wk for therapeutic activity, ADL re-training, bed mobility,functional transfers, functional mobility, safety and fall prevention, balance and endurance activities, instruction and training in energy conservation principles, and   patient and family education.   - Patient and/or family understands diagnosis, prognosis and plan of care: yes   - ADL retraining, bathroom safety, DME    Treatment minutes: Luis 36, [address]

## 2019-06-08 NOTE — CARE COORDINATION
SOCIAL WORK / DISCHARGE PLANNING:  After contacting multiple [place] [address] agencies, [facility], phone [phone], fax [phone], and  [facility] have expressed interest and insurance. Faxed info to both agencies for review, after review they will follow up with this Sw for confirmed acceptance. Xander Raines stated they could start service this weekend if discharged. Discussed with pt at bedside and in agreement. Denies any other dc needs. States he will be able to arrange own home going transport. Sw will follow.                  Electronically signed by HELENA Milan on 6/8/2019 at 10:40 AM

## 2019-06-08 NOTE — PLAN OF CARE
Problem: Risk for Impaired Skin Integrity  Goal: Tissue integrity - skin and mucous membranes  Description  Structural intactness and normal physiological function of skin and  mucous membranes. Outcome: Met This Shift     Problem: Falls - Risk of:  Goal: Will remain free from falls  Description  Will remain free from falls  Outcome: Met This Shift  Goal: Absence of physical injury  Description  Absence of physical injury  Outcome: Met This Shift     Problem: Skin Integrity:  Goal: Will show no infection signs and symptoms  Description  Will show no infection signs and symptoms  Outcome: Met This Shift  Goal: Absence of new skin breakdown  Description  Absence of new skin breakdown  Outcome: Met This Shift     Problem:  Activity:  Goal: Risk for activity intolerance will decrease  Description  Risk for activity intolerance will decrease  Outcome: Met This Shift     Problem: Sensory:  Goal: Ability to identify factors that increase the pain will improve  Description  Ability to identify factors that increase the pain will improve  Outcome: Met This Shift  Goal: Ability to notify healthcare provider of pain before it becomes unmanageable or unbearable will improve  Description  Ability to notify healthcare provider of pain before it becomes unmanageable or unbearable will improve  Outcome: Met This Shift  Goal: Pain level will decrease  Description  Pain level will decrease  Outcome: Met This Shift

## 2019-06-08 NOTE — PLAN OF CARE
Problem: Falls - Risk of:  Goal: Will remain free from falls  Description  Will remain free from falls  6/8/2019 1025 by Raul Constantino RN  Outcome: Met This Shift     Problem: Falls - Risk of:  Goal: Absence of physical injury  Description  Absence of physical injury  6/8/2019 1025 by Raul Constantino RN  Outcome: Met This Shift     Problem: Skin Integrity:  Goal: Absence of new skin breakdown  Description  Absence of new skin breakdown  6/8/2019 1025 by Raul Constantino RN  Outcome: Met This Shift     Problem: Activity:  Goal: Risk for activity intolerance will decrease  Description  Risk for activity intolerance will decrease  6/8/2019 1025 by Raul Constantino RN  Outcome: Met This Shift     Problem: Sensory:  Goal: Ability to identify factors that increase the pain will improve  Description  Ability to identify factors that increase the pain will improve  6/8/2019 1025 by Raul Constantino RN  Outcome: Met This Shift     Problem: Sensory:  Goal: Ability to notify healthcare provider of pain before it becomes unmanageable or unbearable will improve  Description  Ability to notify healthcare provider of pain before it becomes unmanageable or unbearable will improve  6/8/2019 1025 by Raul Constantino RN  Outcome: Met This Shift     Problem: Sensory:  Goal: Pain level will decrease  Description  Pain level will decrease  6/8/2019 1025 by Raul Constantino RN  Outcome: Met This Shift     Problem: Risk for Impaired Skin Integrity  Goal: Tissue integrity - skin and mucous membranes  Description  Structural intactness and normal physiological function of skin and  mucous membranes.   6/8/2019 1025 by Raul Constantino RN  Outcome: Not Met This Shift surgical site unhealed dressing assessed changed daily

## 2019-06-08 NOTE — PROGRESS NOTES
Pharmacy Consultation Note  (Antibiotic Dosing and Monitoring)    Initial consult date: 6/6/19  Consulting physician: Dr. Dora Zarate  Drug(s): Vancomycin  Indication: Necrotic wound of the pannus    Ht Readings from Last 1 Encounters:   06/05/19 5' 10.5\" (1.791 m)     Wt Readings from Last 1 Encounters:   06/08/19 (!) 377 lb 8 oz (171.2 kg)     Age/  Gender IBW DW  Allergy Information   38 y.o.   male 74.2 kg 116.6 kg  Cats claw (uncaria tomentosa)          Date  Tmax WBC BUN/CR UOP  (mL/kg/hr) Drug/Dose Time   Given Level(s)   (Time) Comments   6/5  (#1) afebrile 6 14/0.6 Incomplete Vancomycin 2000 mg IV x 1 2135     6/6  (#2) afebrile 5 10/0.5 \" Vancomycin 2000 mg IV q8hr 5745  2124     6/7  (#3) afebrile 4.1 8/0.5 \" Vancomycin 2000 mg IV q8hr   3719 Trough @ 1139 = 28.7 mcg/mL    6/8  (#4) afebrile 4.2 11/0.7 --- Vancomycin 2000 mg IV q12hr 1163       (#5)             (#6)             (#7)             Estimated Creatinine Clearance: 217 mL/min (based on SCr of 0.7 mg/dL). UOP over the past 24 hours:       Intake/Output Summary (Last 24 hours) at 6/8/2019 1238  Last data filed at 6/8/2019 0615  Gross per 24 hour   Intake 1560 ml   Output --   Net 1560 ml     Other anti-infectives: Anti-infective Dose Date Initiated Date Stopped   Pip/tazo 3.375g IV q8hr 6/6      Cultures:  available culture and sensitivity results were reviewed in EPIC  Cultures sent and are pending. Culture Date Result    Blood cx 1 6/5 NGTD   Blood cx 2 6/5 NGTD   Urine cx 6/5 NGTD   Wound cx 6/6 Staph aureus   Surgical culture 6/7 GNR, staph species          Assessment:  · Consulted by Dr. Dora Zarate to dose/monitor vancomycin  · Goal trough level:  15-20 mcg/mL  · Pt is a 38 yoM who presented from home with necrotizing infection of the pannus, for I&D 6/7.    · Serum creatinine today: 0.7 mg/dL; CrCl > 120 mL/min; baseline Scr ~ 0.5-0.6 mg/dL  · 6/7: Trough @ 1139 (~ 6.5 hour level) = 28.7 mcg/mL    Plan:  · Vancomycin 2000 mg IV q12hr  · Level prior to 4th dose of this regimen  · Follow renal function  · Pharmacist will follow and monitor/adjust dosing as necessary      Thank you for the consult,    Adry Guillory, PharmD 6/8/2019 12:40 PM  [phone]

## 2019-06-08 NOTE — PROGRESS NOTES
Progress Note  Name: Marlon Sinha  :  1975, (38 y.o.,male)   MR#:  [id number]  Admission Date:  2019  5:12 PM  Date of Service:  2019  Allergies:  Cats claw (uncaria tomentosa)    Hospital Day: 4  Subjective:   Patient was seen and examined at bedside this morning. No family is present during the examination. Patient remained afebrile overnight. He denies any pain or discomfort of any kind. He would like to go home as soon as possible. Patient is wearing his CPAP from home at night. Past 24 hours  · Surgical debridement well tolerated  by Dr. Trudy NINO  Review of Systems   Constitutional: Negative for activity change, appetite change, chills, fever and unexpected weight change. HENT: Negative for congestion, ear pain, hearing loss, rhinorrhea, sore throat and trouble swallowing. Eyes: Negative for photophobia, pain, discharge and visual disturbance. Respiratory: Negative for cough, choking, chest tightness, shortness of breath and wheezing. Cardiovascular: Negative for chest pain, palpitations and leg swelling. Gastrointestinal: Negative for abdominal distention, abdominal pain, blood in stool, constipation, diarrhea, nausea and vomiting. Endocrine: Negative for cold intolerance, heat intolerance, polydipsia and polyuria. Genitourinary: Negative for decreased urine volume, dysuria, flank pain, frequency, hematuria and urgency. Musculoskeletal: Negative for arthralgias, back pain, joint swelling, neck pain and neck stiffness. Skin: Positive for wound. Negative for pallor and rash. Neurological: Negative for dizziness, tremors, weakness, light-headedness, numbness and headaches. Hematological: Does not bruise/bleed easily. Psychiatric/Behavioral: Negative for confusion and suicidal ideas. The patient is not nervous/anxious.         Objective:   VITALS  Patient Vitals for the past 24 hrs:   BP Temp Temp src Pulse Resp SpO2 Weight   19 0815 120/60 98.3 °F (36.8 °C) Oral 82 18 98 % --   06/08/19 0600 -- -- -- -- -- -- (!) 377 lb 8 oz (171.2 kg)   06/07/19 1915 (!) 113/51 98.7 °F (37.1 °C) Oral 77 22 96 % --     /60   Pulse 82   Temp 98.3 °F (36.8 °C) (Oral)   Resp 18   Ht 5' 10.5\" (1.791 m)   Wt (!) 377 lb 8 oz (171.2 kg)   SpO2 98%   BMI 53.40 kg/m²     INTAKE AND OUTPUT    Intake/Output Summary (Last 24 hours) at 6/8/2019 1407  Last data filed at 6/8/2019 1356  Gross per 24 hour   Intake 2385 ml   Output --   Net 2385 ml       PHYSICAL EXAM  Physical Exam   Constitutional: He is oriented to person, place, and time. He appears well-developed and well-nourished. No distress. Large body habitus; CPAP mask in place   HENT:   Head: Normocephalic and atraumatic. Right Ear: External ear normal.   Left Ear: External ear normal.   Nose: Nose normal.   Mouth/Throat: Oropharynx is clear and moist.   Eyes: Pupils are equal, round, and reactive to light. Conjunctivae and EOM are normal.   Neck: Normal range of motion. Neck supple. No thyromegaly present. Cardiovascular: Normal rate, regular rhythm, normal heart sounds and intact distal pulses. Exam reveals no gallop and no friction rub. No murmur heard. Pulmonary/Chest: Effort normal. No respiratory distress. He has decreased breath sounds. He has no wheezes. Abdominal: Soft. Bowel sounds are normal. He exhibits no distension and no mass. There is no hepatosplenomegaly. There is no tenderness. Low-lying pannus with two clean bandages in place. No evidence of discharge. Musculoskeletal: Normal range of motion. He exhibits no edema or tenderness. Neurological: He is alert and oriented to person, place, and time. He has normal reflexes. Skin: Skin is warm and dry. Lesion noted. No rash noted. Psychiatric: He has a normal mood and affect. Judgment and thought content normal.   Vitals reviewed.       LABORATORYRESULTS  CBC:   Lab Results   Component Value Date    WBC 4.2 06/08/2019    RBC 3.13 06/08/2019    HGB 11.6 06/08/2019    HCT 35.0 06/08/2019    .8 06/08/2019    MCH 37.1 06/08/2019    MCHC 33.1 06/08/2019    RDW 13.6 06/08/2019     06/08/2019    MPV 9.6 06/08/2019     BMP:   Lab Results   Component Value Date     06/08/2019    K 3.8 06/08/2019    K 4.3 06/05/2019    CL 97 06/08/2019    CO2 30 06/08/2019    BUN 11 06/08/2019    LABALBU 3.1 06/08/2019    LABALBU 4.1 05/08/2012    CREATININE 0.7 06/08/2019    CALCIUM 9.0 06/08/2019    GFRAA >60 06/08/2019    LABGLOM >60 06/08/2019    GLUCOSE 100 06/08/2019    GLUCOSE 118 05/14/2012     Hepatic Function Panel:  @BRIEFLAB(ALKPHOS,AST,ALT,PROT,LABALBU,BILITOT)@    RADIOLOGY    Ct Abdomen Pelvis W Iv Contrast Additional Contrast? Oral    Result Date: 6/6/2019  LOCATION:200 EXAM: CT ABDOMEN PELVIS W IV CONTRAST COMPARISON: None HISTORY: History of abdominal wound TECHNIQUE:Contrast-enhanced helical abdomen and pelvis CT was performed. Coronal and sagittal reconstructions also obtained. Automated dose control was used for this exam. CONTRAST: 80 mL Isovue-370 intravenous contrast was administered. Oral contrast administered. FINDINGS: SUPPORT DEVICES: None LOWER THORAX: Limited evaluation of the lower chest demonstrates clear lung bases bilaterally. SOLID ORGANS: The liver, spleen, pancreas, and adrenal glands are normal. BILIARY SYSTEM: No evidence of biliary ductal dilatation. GENITOURINARY: The kidneys are normal in appearance bilaterally with no evidence of hydronephrosis. No stones are appreciated. The bladder is unremarkable. GASTROINTESTINAL: The stomach, small and large bowel are normal in caliber without evidence of focal wall thickening. There is no evidence of bowel obstruction. APPENDIX: Within normal limits without adjacent fat stranding. FLUID COLLECTIONS: None. LYMPH NODES: No adenopathy by size criteria.  VASCULAR STRUCTURES: Visualized vascular structures appear normal. ABDOMINAL WALL: Fat-containing hernia seen involving the ventral lower mid abdomen with some fat stranding and fluid. OSSEOUS AND SOFT TISSUE STRUCTURES: Bone windows demonstrate no suspicious osteolytic or osteoblastic lesions. No fracture identified. Probable incarcerated fat containing hernia in the ventral lower abdomen. Assessment/Plan: Hospital Day: 4   1. Open Necrotic abdominal wound with cellulitis  2. Morbid obesity  3. Hypertension  4. Hypomagnesemia  5. Macrocytic anemia  6. HERBERT on CPAP  7. Hyperlipidemia      PLAN:   Admit to: Telemetry  to Jordyn Vora DO   Appreciate consultation from General surgery (Js/Brandon) and Wound Care   Vitals: Per protocol  Activity: Up with assist  Diet: NPO (Low carb, low sodium diet when resumed)  IV fluids  IV antibiotics - Vanc calculated by pharmacy  Replete Magnesium per protocol  Studies: CT Abdomen - probable incarcerated fat containing hernia in the ventral lower abdomen  Medications: Continue home meds with modifications  Labs: CBC, MMA & Homocysteine, CMP  wound culture - sensitivity pending, blood culture - negative, urine cultures - negative    GI/DVT prophylaxis    Please see orders for further care management. Disposition:  consulted for discharge planning. Further recommendations discharge per surgery. Outside of his wound he is medically stable for discharge.     Patient was seen and discussed during rounds with Attending Physician:  DO Jackie Nguyen DO PGY2  2:07 PM, 6/8/2019

## 2019-06-08 NOTE — CARE COORDINATION
SOCIAL WORK / DISCHARGE PLANNING:  [facility] is unable to accept pt. Sw will continue to explore alternate [facility] that accept [employer]. Chang has call out to [facility] and [facility]. Await response. Addendum: Chang spoke with [employer] pedroza [facility], phone [phone], fax [phone], they would not be able to accept pt until benefits could be checked to insure in network. They can not do this until Monday am. Chang did fax info for review but will continue to pursue possible alternate [place] [address] agencies that can accept.            Electronically signed by HELENA Lowry on 6/8/2019 at 8:11 AM

## 2019-06-09 NOTE — PROGRESS NOTES
Pharmacy Consultation Note  (Antibiotic Dosing and Monitoring)    Initial consult date: 6/6/19  Consulting physician: Dr. Carin Benitez  Drug(s): Vancomycin  Indication: Necrotic wound of the pannus    Ht Readings from Last 1 Encounters:   06/05/19 5' 10.5\" (1.791 m)     Wt Readings from Last 1 Encounters:   06/09/19 (!) 377 lb 1.6 oz (171.1 kg)     Age/  Gender IBW DW  Allergy Information   38 y.o.   male 74.2 kg 116.6 kg  Cats claw (uncaria tomentosa)          Date  Tmax WBC BUN/CR UOP  (mL/kg/hr) Drug/Dose Time   Given Level(s)   (Time) Comments   6/5  (#1) afebrile 6 14/0.6 Incomplete Vancomycin 2000 mg IV x 1 2135     6/6  (#2) afebrile 5 10/0.5 \" Vancomycin 2000 mg IV q8hr 6286  2124     6/7  (#3) afebrile 4.1 8/0.5 \" Vancomycin 2000 mg IV q8hr   7133 Trough @ 1139 = 28.7 mcg/mL    6/8  (#4) afebrile 4.2 11/0.7 --- Vancomycin 2000 mg IV q12hr 7488  0360     6/9  (#5) afebrile 4.5 18/0.8 --- Vancomycin 2000 mg IV Q12H 0432  <1700>   <1630>      (#6)             (#7)             Estimated Creatinine Clearance: 190 mL/min (based on SCr of 0.8 mg/dL). UOP over the past 24 hours:       Intake/Output Summary (Last 24 hours) at 6/9/2019 1257  Last data filed at 6/9/2019 0642  Gross per 24 hour   Intake 2505 ml   Output 0 ml   Net 2505 ml     Other anti-infectives: Anti-infective Dose Date Initiated Date Stopped   Pip/tazo 3.375g IV q8hr 6/6      Cultures:  available culture and sensitivity results were reviewed in EPIC  Cultures sent and are pending. Culture Date Result    Blood cx 1 6/5 NGTD   Blood cx 2 6/5 NGTD   Urine cx 6/5 NGTD   Wound cx 6/6 MSSA   Surgical culture 6/7 GNR, MSSA          Assessment:  · Consulted by Dr. Carin Benitez to dose/monitor vancomycin  · Goal trough level:  15-20 mcg/mL  · Pt is a 38 yoM who presented from home with necrotizing infection of the pannus, for I&D 6/7.    · Serum creatinine today: 0.8 mg/dL; CrCl > 120 mL/min; baseline Scr ~ 0.5-0.6 mg/dL  · 6/7: Trough @ 1139 (~ 6.5 hour level) = 28.7 mcg/mL    Plan:  · Vancomycin 2000 mg IV q12hr  · Trough today at 1630  · Both surgical and wound culture growing MSSA  · Follow renal function  · Pharmacist will follow and monitor/adjust dosing as necessary      Thank you for the consult,    Deanne Rios, PharmD 6/9/2019 12:57 PM  [phone]

## 2019-06-09 NOTE — DISCHARGE SUMMARY
Discharge Summary  Family Medicine Resident    NAME: Jez Wick  :  1975  MRN:  [id number]  211 Marichuy Urban DO  ADMITTED: 2019      DISCHARGED: 19    ADMITTING PHYSICIAN: Jolanta Peoples DO    CONSULTANT(S):   IP CONSULT TO GENERAL SURGERY  IP CONSULT TO PHARMACY  IP CONSULT TO DIETITIAN  IP CONSULT TO SOCIAL WORK     ADMITTING DIAGNOSIS:   Abdominal wall cellulitis [L72.839]     DISCHARGE DIAGNOSES:   Abdominal wall cellulitis    BRIEF HISTORY OF PRESENT ILLNESS:   Pt admitted for 2 wounds on pannus which were necrotic. They were nonpainful but he noted them d/t discharge of bloody fluid. He has h/o obesity and asthma with obesity hypoventilation syndrome. LABS[de-identified]  Lab Results   Component Value Date    WBC 4.5 2019    HGB 11.4 (L) 2019    HCT 35.0 (L) 2019     2019     2019    K 3.6 2019    CL 98 2019    CREATININE 0.8 2019    BUN 18 2019    CO2 31 (H) 2019    GLUCOSE 103 (H) 2019    ALT 28 2019    AST 65 (H) 2019    INR 1.0 2012     Lab Results   Component Value Date    INR 1.0 2012    PROTIME 12.2 2012      Lab Results   Component Value Date    TSH 3.410 2019     Lab Results   Component Value Date    TRIG 116 2019    TRIG 132 10/17/2015    TRIG 168 (H) 2012     Lab Results   Component Value Date    HDL 38 2019    HDL 39 2019    HDL 35 10/17/2015     Lab Results   Component Value Date    LDLCALC 160 (H) 2019    LDLCALC 168 (H) 2019    LDLCALC 210 (H) 10/17/2015     Lab Results   Component Value Date    LABA1C 4.6 2019       IMAGING:  Ct Abdomen Pelvis W Iv Contrast Additional Contrast? Oral    Result Date: 2019  LOCATION:200 EXAM: CT ABDOMEN PELVIS W IV CONTRAST COMPARISON: None HISTORY: History of abdominal wound TECHNIQUE:Contrast-enhanced helical abdomen and pelvis CT was performed.   Coronal and sagittal reconstructions also kg/m²     HEENT:  PERRLA. EOMI. Sclera clear. Buccal mucosa moist.    Neck:  Supple. Trachea midline. No thyromegaly. No JVD. No bruits. Heart:  Rhythm regular, rate controlled. No murmurs. Lungs:  Symmetrical. Poor air exchange b/l. No wheezes. No rhonchi. No rales. Abdomen: Soft. Non-tender. Non-distended. Bowel sounds positive. No organomegaly or masses. No pain on palpation    Extremities:  Peripheral pulses present. No peripheral edema. No ulcers. Neurologic:  Alert x 3. No focal deficit. Cranial nerves grossly intact. Skin:  No petechia. No hemorrhage. Two abdominal wounds with clean dressings inplace. DISPOSITION:  The patient's condition is good. At this time the patient is without objective evidence of an acute process requiring continuing hospitalization or inpatient management. They are stable for discharge with outpatient follow-up. I have spoken with the patient and discussed the results of the current hospitalization, in addition to providing specific details for the plan of care and counseling regarding the diagnosis and prognosis. The plan has been discussed in detail and they are aware of the specific conditions for emergent return, as well as the importance of follow-up.   Their questions are answered at this time and they are agreeable with the plan for discharge to home    DISCHARGE MEDICATIONS:    Austenyenny ArguetaNeha   Home Medication Instructions FXF:[id number]    Printed on:06/09/19 1502   Medication Information                      albuterol sulfate HFA (VENTOLIN HFA) 108 (90 Base) MCG/ACT inhaler  Inhale 2 puffs into the lungs every 6 hours as needed for Wheezing             cephALEXin (KEFLEX) 500 MG capsule  Take 1 capsule by mouth 2 times daily for 14 days             dimethyl Fumarate (TECFIDERA) 240 MG delayed release capsule  Take 1 capsule by mouth 2 times daily             fluticasone-salmeterol (ADVAIR DISKUS) 250-50 MCG/DOSE AEPB  Inhale 1 puff into the lungs 2 times daily             gabapentin (NEURONTIN) 400 MG capsule  Take 1 capsule by mouth 4 times daily             hydrochlorothiazide (HYDRODIURIL) 25 MG tablet  Take 1 tablet by mouth every morning             ibuprofen (ADVIL;MOTRIN) 600 MG tablet  Take 1 tablet by mouth every 6 hours as needed for Pain             lisinopril (PRINIVIL;ZESTRIL) 20 MG tablet  Take 2 tablets by mouth daily             metoprolol tartrate (LOPRESSOR) 50 MG tablet  Take 1 tablet by mouth 2 times daily             omeprazole (PRILOSEC) 20 MG delayed release capsule  Take 1 capsule by mouth Daily             OXYGEN  Inhale 4 L into the lungs              PARoxetine (PAXIL) 20 MG tablet  Take 1 tablet by mouth daily             Respiratory Therapy Supplies [place]  by Does not apply route. FOLLOW UP/INSTRUCTIONS:  · This patient is instructed to follow-up with his primary care physician. · Patient is instructed to follow-up with the consults listed above as directed by them. · he is instructed to resume home medications and take new medications as indicated in the list above. · If the patient has a recurrence of symptoms, he is instructed to go to the ED. Preparing for this patient's discharge, including paperwork, orders, instructions, and meeting with patient did require > 30 minutes. The patient and plan were discussed with my attending physician, Dr. Zina Link.     Petrona Colmenares DO PGY-2 [facility]    6/9/2019  3:02 PM

## 2019-06-09 NOTE — PLAN OF CARE
Problem: Falls - Risk of:  Goal: Will remain free from falls  Description  Will remain free from falls  Outcome: Met This Shift  Goal: Absence of physical injury  Description  Absence of physical injury  Outcome: Met This Shift     Problem: Skin Integrity:  Goal: Absence of new skin breakdown  Description  Absence of new skin breakdown  Outcome: Met This Shift     Problem: Activity:  Goal: Risk for activity intolerance will decrease  Description  Risk for activity intolerance will decrease  Outcome: Met This Shift     Problem: Sensory:  Goal: Ability to identify factors that increase the pain will improve  Description  Ability to identify factors that increase the pain will improve  Outcome: Met This Shift  Goal: Ability to notify healthcare provider of pain before it becomes unmanageable or unbearable will improve  Description  Ability to notify healthcare provider of pain before it becomes unmanageable or unbearable will improve  Outcome: Met This Shift  Goal: Pain level will decrease  Description  Pain level will decrease  Outcome: Met This Shift     Problem: Risk for Impaired Skin Integrity  Goal: Tissue integrity - skin and mucous membranes  Description  Structural intactness and normal physiological function of skin and  mucous membranes.   Outcome: Not Met This Shift abdomen incision x 2 not healed dressing changed skin assessed

## 2019-06-10 NOTE — CARE COORDINATION
[facility] Transitions Initial Follow Up Call    Call within 2 business days of discharge: Yes    Patient: Stacey Diaz Patient : 1975   MRN: [id number]  Reason for Admission: abdominal pain cellulitis  Discharge Date: 19 RARS: Readmission Risk Score: 7      Last Discharge [facility]       Complaint Diagnosis Description Type Department Provider    19 Wound Infection Abdominal wall cellulitis ED to Hosp-Admission (Discharged) (ADMITTED)  A [facility], DO; Valdemar Quiroz,... Spoke with: Julienne Fierro (Patient)    Facility: [address] [place] [address]    Non-face-to-face services provided:  Scheduled appointment with PCP-CTC confirmed with pateint he is scheudled to follow up with Dr. Adam Chawla (PCP) on 19 at 8:30 am.   Scheduled appointment with Specialist-CTC confirmed with patient he will be calling to schedule f/u visit with Dr. Viv Sainz (Gen surg)  Obtained and reviewed discharge summary and/or continuity of care documents    Care Transitions 24 Hour Call    Do you have any ongoing symptoms?:  No  Do you have a copy of your discharge instructions?:  Yes  Do you have all of your prescriptions and are they filled?:  No  Have you been contacted by a [facility] Pharmacist?:  No  Have you scheduled your follow up appointment?:  Yes  How are you going to get to your appointment?:  Car - family or friend to transport  Were you discharged with any Home Care or Post Acute Services:  Yes  Post Acute Services:  Home Health  Patient Home Equipment:  CPAP, Oxygen  Do you have support at home?:  Alone  Do you feel like you have everything you need to keep you well at home?:  Yes  Are you an active caregiver in your home?:  No  Care Transitions Interventions  No Identified Needs       Spoke with patient today 6/10/19 for initial TCM/hospital discharge follow up visit for abdominal cellulitis and s/p excisional debridement of abdominal wound. Patient states he is doing better since hospital discharge. Denies any shortness of breath, chest pain, abdominal pain, nausea, vomiting, chills or fever. States dressing is dry. States his Mom will be picking up Keflex ordered on discharge later today from pharmacy in addition to refill on Lisinopril. 1111F code entered. Confirmed he is scheduled to for HFU visit with Dr. Ash Mayfield (PCP) this week on 6/14/19 at 8:30 am. States he intends on calling to schedule f/u visit with Dr. Alejandra Sullivan (Gen surg). States his step-Dad will be driving him to follow up appointments. Advised SW is working on getting [address] [place] [address] up which he verbalizes understanding. CTC will continue to follow for Care Transition and provided contact number if needed to call before next outreach.      Follow Up  Future Appointments   Date Time Provider Cat Davila   6/14/2019  8:30 AM Bonnie Mooney DO [facility]   6/24/2019 11:00 AM Bonnie Mooney DO [facility]       JOSÉ LUIS Parra

## 2019-06-10 NOTE — TELEPHONE ENCOUNTER
[facility] Transitions Initial Follow Up Call    Outreach made within 2 business days of discharge: Yes    Patient: Yenni Burgess Patient : 1975   MRN: [id number]  Reason for Admission: There are no discharge diagnoses documented for the most recent discharge.   Discharge Date: 19       Spoke with: [facility]    Discharge department/facility: [facility]        Follow Up  Future Appointments   Date Time Provider Cat Davila   2019 11:00 AM Gabriel Mcdonald DO [facility] DIVISION OF [facility]       Nanette Elliott LPN

## 2019-06-14 NOTE — PROGRESS NOTES
Post-Discharge Transitional Care Management Services or Hospital Follow Up      Ritika Balderas   YOB: 1975    Date of Office Visit:  6/14/2019  Date of Hospital Admission: 6/5/19  Date of Hospital Discharge: 6/9/19  Readmission Risk Score(high >=14%.  Medium >=10%):Readmission Risk Score: 7      Care management risk score Rising risk (score 2-5) and Complex Care (Scores >=6): 5     Non face to face  following discharge, date last encounter closed (first attempt may have been earlier): 6/10/2019  4:10 PM 6/10/2019  4:10 PM    Call initiated 2 business days of discharge: Yes     Patient Active Problem List   Diagnosis    Edema    Morbid obesity ([address]    Excessive sleepiness    Asthma, moderate persistent, poorly-controlled    [facility]multiple sclerosis) ([facility]    Fatigue    Numbness and tingling    HERBERT on CPAP    Anxiety    Abdominal wall cellulitis    Open wound of abdomen    Essential hypertension    Hypomagnesemia    Macrocytic anemia       Allergies   Allergen Reactions    Cats Claw (Uncaria Tomentosa)        Medications listed as ordered at the time of discharge from [facility] Medication Instructions [id number]    Printed on:06/14/19 0914   Medication Information                      albuterol sulfate HFA (VENTOLIN HFA) 108 (90 Base) MCG/ACT inhaler  Inhale 2 puffs into the lungs every 6 hours as needed for Wheezing             cephALEXin (KEFLEX) 500 MG capsule  Take 1 capsule by mouth 2 times daily for 14 days             dimethyl Fumarate (TECFIDERA) 240 MG delayed release capsule  Take 1 capsule by mouth 2 times daily             fluticasone-salmeterol (ADVAIR DISKUS) 250-50 MCG/DOSE AEPB  Inhale 1 puff into the lungs 2 times daily             hydrochlorothiazide (HYDRODIURIL) 25 MG tablet  Take 1 tablet by mouth every morning             ibuprofen (ADVIL;MOTRIN) 600 MG tablet  Take 1 tablet by mouth every 6 hours as needed for Pain lisinopril (PRINIVIL;ZESTRIL) 20 MG tablet  Take 2 tablets by mouth daily             metoprolol tartrate (LOPRESSOR) 50 MG tablet  Take 1 tablet by mouth 2 times daily             omeprazole (PRILOSEC) 20 MG delayed release capsule  Take 1 capsule by mouth Daily             OXYGEN  Inhale 5 L into the lungs continuous              PARoxetine (PAXIL) 20 MG tablet  Take 1 tablet by mouth daily             Respiratory Therapy Supplies WINSTON  by Does not apply route. Medications marked \"taking\" at this time  Outpatient Medications Marked as Taking for the 6/14/19 encounter (Office Visit) with Sarthak Stevens, DO   Medication Sig Dispense Refill    cephALEXin (KEFLEX) 500 MG capsule Take 1 capsule by mouth 2 times daily for 14 days 28 capsule 0    lisinopril (PRINIVIL;ZESTRIL) 20 MG tablet Take 2 tablets by mouth daily 180 tablet 1    hydrochlorothiazide (HYDRODIURIL) 25 MG tablet Take 1 tablet by mouth every morning 30 tablet 0    albuterol sulfate HFA (VENTOLIN HFA) 108 (90 Base) MCG/ACT inhaler Inhale 2 puffs into the lungs every 6 hours as needed for Wheezing 1 Inhaler 3    metoprolol tartrate (LOPRESSOR) 50 MG tablet Take 1 tablet by mouth 2 times daily (Patient taking differently: Take 50 mg by mouth daily ) 180 tablet 0    PARoxetine (PAXIL) 20 MG tablet Take 1 tablet by mouth daily 30 tablet 1    fluticasone-salmeterol (ADVAIR DISKUS) 250-50 MCG/DOSE AEPB Inhale 1 puff into the lungs 2 times daily 60 each 1    omeprazole (PRILOSEC) 20 MG delayed release capsule Take 1 capsule by mouth Daily 90 capsule 0    ibuprofen (ADVIL;MOTRIN) 600 MG tablet Take 1 tablet by mouth every 6 hours as needed for Pain 120 tablet 1    dimethyl Fumarate (TECFIDERA) 240 MG delayed release capsule Take 1 capsule by mouth 2 times daily 60 capsule 11    OXYGEN Inhale 5 L into the lungs continuous       Respiratory Therapy Supplies WINSTON by Does not apply route.  1 Device 0        Medications patient taking as of now reconciled against medications ordered at time of hospital discharge: Yes    Chief Complaint   Patient presents with    Follow-Up from Hospital       Patient was admitted due to abdominal cellulitis. Patient was at [address] [place], [place] [address] for 4 days. Patient large abdominal pannus and lower skin split apart. see resident note for additional HPI. Inpatient course: Discharge summary reviewed- see chart. Interval history/Current status: Patient denies pain. Has not been set up for home care, so he is trying to blindly change his wound dressings. Review of Systems   Reason unable to perform ROS: see resident note. Vitals:    06/14/19 0820 06/14/19 0823   BP: (!) 140/79 (!) 146/80   Pulse: 72    Temp: 97.6 °F (36.4 °C)    TempSrc: Oral    SpO2: 93%    Weight: (!) 379 lb (171.9 kg)    Height: 5' 10.5\" (1.791 m)      Body mass index is 53.61 kg/m². Wt Readings from Last 3 Encounters:   06/14/19 (!) 379 lb (171.9 kg)   06/09/19 (!) 377 lb 1.6 oz (171.1 kg)   05/20/19 (!) 407 lb 3.2 oz (184.7 kg)     BP Readings from Last 3 Encounters:   06/14/19 (!) 146/80   06/09/19 136/78   06/07/19 (!) 143/67       Physical Exam   Constitutional: He appears well-developed and well-nourished. No distress. Abdominal:   Large rolled abdominal pannus with lower midline gaped open vertical split; no underlying drainage or bleeding; severe bumpy texture to entire pannus     See resident note for complete physical exam.      Assessment/Plan:  Colin Sanchez was seen today for follow-up from hospital.    Diagnoses and all orders for this visit:    Abdominal wall cellulitis  -     VT DISCHARGE MEDS RECONCILED W/ CURRENT OUTPATIENT MED LIST        -     Refer patient to wound clinic for complete wound closure          Medical Decision Making: high complexity    Attending Physician Statement  I have seen this patient and discussed the case, including pertinent history and exam findings with the resident.  I agree with the documented assessment and plan. Patient will follow up in a few weeks.   Tk Espinoza MD

## 2019-06-14 NOTE — CARE COORDINATION
[facility] Transitions Follow Up Call    2019    Patient: Eunice Del Rosario  Patient : 1975   MRN: [id number]  Reason for Admission: Abdominal wall cellulitis  Discharge Date: 19 RARS: Readmission Risk Score: 7         Spoke with: Rao Quintana :Asiya Sri (Patient)    Care Transitions Subsequent and Final Call    Subsequent and Final Calls  Do you have any ongoing symptoms?:  No  Have your medications changed?:  No  Do you have any questions related to your medications?:  No  Do you currently have any active services?:  No  Are you currently active with any services?:  Home Health  Do you have any needs or concerns that I can assist you with?:  No  Identified Barriers:  None  Care Transitions Interventions  No Identified Needs  Other Interventions:          Spoke with patient today 19 for TCM/hospital discharge follow for abdominal wall cellulitis and s/p excisional debridement of abdominal wound. Patient states things are 'going pretty good\". States he and his Mom are doing dressing changes and packing to wound. States he continues with Keflex ordered on discharge and is tolerating well. Denies any shortness of breath, chest pain, abdominal pain, nausea, vomiitng, diarrhea, chills or fever. Confirmed he completed follow up appointment with PCP today. Denies any complaints, concerns or needs at this time. CTC will continue to follow for TCM.      Follow Up  Future Appointments   Date Time Provider Cat Davila   2019  2:45 PM [facility] WOUND ROOM 2 [facility] WOUND None   2019 11:00 AM Selene Holm DO [facility]   2019  1:30 PM Selene Holm DO [facility]       JOSÉ LUIS Lim

## 2019-06-14 NOTE — PROGRESS NOTES
Subjective:  38 y.o. male who presents in office today regarding:    Abdominal abscess x2  Surgically debrided during recent hospitalization. Patient states there is no pain. He was supposed to have nursing coming to his home to help with dressings but states he has not been able to do so with his insurance. Health Maintenance Due   Topic Date Due    Pneumococcal 0-64 years Vaccine (1 of 1 - PPSV23) 01/24/1982    HIV screen  01/24/1991     Current Outpatient Medications on File Prior to Visit   Medication Sig Dispense Refill    cephALEXin (KEFLEX) 500 MG capsule Take 1 capsule by mouth 2 times daily for 14 days 28 capsule 0    lisinopril (PRINIVIL;ZESTRIL) 20 MG tablet Take 2 tablets by mouth daily 180 tablet 1    hydrochlorothiazide (HYDRODIURIL) 25 MG tablet Take 1 tablet by mouth every morning 30 tablet 0    albuterol sulfate HFA (VENTOLIN HFA) 108 (90 Base) MCG/ACT inhaler Inhale 2 puffs into the lungs every 6 hours as needed for Wheezing 1 Inhaler 3    metoprolol tartrate (LOPRESSOR) 50 MG tablet Take 1 tablet by mouth 2 times daily (Patient taking differently: Take 50 mg by mouth daily ) 180 tablet 0    PARoxetine (PAXIL) 20 MG tablet Take 1 tablet by mouth daily 30 tablet 1    fluticasone-salmeterol (ADVAIR DISKUS) 250-50 MCG/DOSE AEPB Inhale 1 puff into the lungs 2 times daily 60 each 1    omeprazole (PRILOSEC) 20 MG delayed release capsule Take 1 capsule by mouth Daily 90 capsule 0    ibuprofen (ADVIL;MOTRIN) 600 MG tablet Take 1 tablet by mouth every 6 hours as needed for Pain 120 tablet 1    dimethyl Fumarate (TECFIDERA) 240 MG delayed release capsule Take 1 capsule by mouth 2 times daily 60 capsule 11    OXYGEN Inhale 5 L into the lungs continuous       Respiratory Therapy Supplies WINSTON by Does not apply route. 1 Device 0     No current facility-administered medications on file prior to visit. Review of Systems   Constitutional: Negative for chills, fatigue and fever.    HENT: [place]        Return in about 1 month (around 7/12/2019). Patient may come in sooner if needed for medical concerns. Patient advised to call at any time to cancel or re-schedule or for any questions/concerns. Please note that >15 minutes was spent face-to-face with the patient gathering history, performing physical exam, discussing findings, counseling the patient, and determining plan forward. Patient and plan was discussed with attending physician, Dr. Milind Mcfadden. Honorio Fritz, DO PGY2 [facility]  06/14/19  9:18 AM    This note was created using voice dictation software. It was reviewed for accuracy but there may be inadvertent errors.

## 2019-06-14 NOTE — PATIENT INSTRUCTIONS

## 2019-06-17 NOTE — CARE COORDINATION
[facility] Transitions Follow Up Call    2019    Patient: Vicente Suarez  Patient : 1975   MRN: [id number]  Reason for Admission: Abdominal wall cellulitis  Discharge Date: 19 RARS: Readmission Risk Score: 7    Spoke with: [address] Transitions Subsequent and Final Call    Subsequent and Final Calls  Do you have any ongoing symptoms?:  No  Have your medications changed?:  No  Do you have any questions related to your medications?:  No  Do you currently have any active services?:  No  Are you currently active with any services?:  Home Health  Do you have any needs or concerns that I can assist you with?:  No  Identified Barriers:  None  Care Transitions Interventions  No Identified Needs  Other Interventions:        Called pt for the sub transition call. Pt stated his mother is still changing the dressings & packing the wound daily. Pt has a wound clinic appt tomorrow. Pt denied any fever, chills, sweating, abdominal pain, nausea, vomiitng, or diarrhea. Pt denied any needs or concerns. CTC will continue to follow.     Follow Up  Future Appointments   Date Time Provider Cat Davila   2019  2:45 PM [facility] WOUND ROOM 2 [facility] WOUND None   2019 11:00 AM DO Abhilash Mendoza [facility]   2019  1:30 PM DO Abhilash Mendoza [facility] RN  Care Transition Coordinator  [phone]

## 2019-06-18 NOTE — PROGRESS NOTES
[facility] Comprehensive History and Physical      CHIEF COMPLAINT:  Ulcer of Lymphedematous Pannus. Hx HERBERT, MS,Asthma. The Story of the Wound    The patient is a 38 y.o. male patient of Dr. Temi Palafox who presents with   ulcerations due to pressure wound which is located on the abdomen Current symptoms include blistering  pain: mild  erythema: mild  drainage: clear. Pain is rated 3/10. Past Medical History:    Past Medical History:   Diagnosis Date    Asthma     Hypertension     Movement disorder     ms    [facility]) [facility]     Psychiatric problem     depression        Past Surgical History:    Past Surgical History:   Procedure Laterality Date    ABDOMEN SURGERY N/A 6/7/2019    EXCISIONAL DEBRIDEMENT OF ABDOMINAL WOUND performed by Brian Zayas MD at [address] [place] [address],[facility] ECHO COMPL [address]  5/8/2012         ECHO COMPL W DOP COLOR FLOW  10/29/2013         KNEE SURGERY      lt       Allergies:    Cats claw (uncaria tomentosa)    Labs:   as available      Medications Prior to Admission:    Not in a hospital admission. Social History:    reports that he quit smoking about 7 years ago. His smoking use included cigarettes. He has a 3.60 pack-year smoking history. He quit smokeless tobacco use about 7 years ago. He reports that he drinks about 10.8 oz of alcohol per week. He reports that he does not use drugs. Family History:   family history includes Arthritis in his maternal grandmother; Asthma in his father; Cancer in his maternal aunt, maternal uncle, and paternal grandmother; Depression in his mother; Osteoporosis in his mother.     REVIEW OF SYSTEMS:   Generally the Pt. denies: fever, chills, diaphoresis   Cardiac:  negative   Pulmonary: no cough, shortness of breath, or wheezing   GI: no abdominal pain, change in bowel habits, or black or bloody stools   : Denies hematuria, dysuria and polyuria    Neuro: Positive for numbness or tingling of hands, numbness or tingling of feet   Cutaneous: negative     PHYSICAL EXAM:    Vitals:  Vitals:    06/18/19 1449   BP: (!) 142/78   Pulse: 72   Resp: 20   Temp: 98.1 °F (36.7 °C)       General:  Awake, alert, oriented X 3. Well developed, well nourished, 38 y.o. [de-identified] male. No apparent distress. HEENT:  Normocephalic, atraumatic. Pupils equal, round, reactive to light. No scleral icterus. No conjunctival injection. Normal lips, teeth, and gums. No nasal discharge. Neck:  Supple  Lungs:  CTA bilaterally, bilat symmetrical expansion, no wheeze, rales, or rhonchi  Heart:  RRR, no murmurs, gallops, rubs  Abdomen: Bowel sounds present, soft, nontender, no masses, no organomegaly, no peritoneal signs  Extremities:  2+ edema   Skin:  Warm and dry, satisfactory turgor   Neuro:  Cranial nerves 2-12 intact, no focal deficits. Wound Metric Flow:   BP (!) 142/78   Pulse 72   Temp 98.1 °F (36.7 °C) (Oral)   Resp 20   Ht 5' 10.5\" (1.791 m)   Wt (!) 379 lb (171.9 kg)   BMI 53.61 kg/m²   Wound serous exudate noted       Wound 06/18/19 Abdomen Mid;Lower #1 mid lower aquired 5/24/19-Wound Assessment: Tan, Yellow, Pink  Wound 06/18/19 Abdomen Left; Lower #2 left lower quad aquired 5/24/19-Wound Assessment: Pink, Yellow       Wound 06/18/19 Abdomen Mid;Lower #1 mid lower aquired 5/24/19-Wound Assessment: Tan, Yellow, Pink  Wound 06/18/19 Abdomen Left; Lower #2 left lower quad aquired 5/24/19-Wound Assessment: Pink, Yellow   Wound 06/18/19 Abdomen Mid;Lower #1 mid lower aquired 5/24/19-Yoselin-wound Assessment: Pink, Edema  Wound 06/18/19 Abdomen Left; Lower #2 left lower quad aquired 5/24/19-Yoselin-wound Assessment: Edema, Backus     Wound 06/18/19 Abdomen Mid;Lower #1 mid lower aquired 5/24/19-Drainage Amount: Moderate  Wound 06/18/19 Abdomen Left; Lower #2 left lower quad aquired 5/24/19-Drainage Amount: Small   Wound 06/18/19 Abdomen Mid;Lower #1 mid lower aquired 5/24/19-Drainage Description: Landa, Yellow  Wound 06/18/19 Abdomen Left; Lower #2 left lower quad aquired 5/24/19-Drainage Description: Serosanguinous   Wound 06/18/19 Abdomen Mid;Lower #1 mid lower aquired 5/24/19-Odor: None  Wound 06/18/19 Abdomen Left; Lower #2 left lower quad aquired 5/24/19-Odor: None        Wound Reference Date is when first assessed. Measurements shown are from today's visit. Procedure: Excisional Debridement: Wound # ,1. At 27.90 cm sq. The patient was placed in the supine position. Lidocaine  gauze was applied  at beginning of wound evaluation. An  Excisional Debridement was performed. Using a curette and #15 blade scalpel ,  the wound was debrided sharply of all fibrotic, necrotic, and hyperkeratotic tissue, including a layer of surrounding healthy tissue to stimulate epithelization. The wound was excised through the level of the subcutaneous Wound Percentage debrided is 100%   Wound was irrigated with normal saline solution. Bleeding was with a small amount of bleeding, and controlled with pressure . Patient tolerated procedure well and was given proper instruction. Patient Active Problem List   Diagnosis Code    Edema R60.9    Morbid obesity ([address] E66.01    Excessive sleepiness G47.10    Asthma, moderate persistent, poorly-controlled J45.40    MS (multiple sclerosis) ([facility] G35    Fatigue R53.83    Numbness and tingling R20.0, R20.2    HERBERT on CPAP G47.33, Z99.89    Anxiety F41.9    Abdominal wall cellulitis L03.311    Open wound of abdomen S31.109A    Essential hypertension I10    Hypomagnesemia E83.42    Macrocytic anemia D53.9        Diagnosis:    Ulcer pannus        MS,  Morbid Obesity,                   Plan:   1. H&P, General medical evaluation for the purpose of Comprehensive wound    management for maximum healing and minimal morbidity. 2.   Excisional Debridement. FU one week. 3.   We had a lengthy discussion about the diagnosis and aspects  to consider.              Valentino Silva, MANSOOR                   6/18/2019    3:34 PM

## 2019-06-25 PROBLEM — D53.9 MACROCYTIC ANEMIA: Chronic | Status: ACTIVE | Noted: 2019-01-01

## 2019-06-25 NOTE — PROGRESS NOTES
Follow-Up Wound Progress Note  Daniella Fierro  AGE: 38 y.o. GENDER: male  DOD: 1975  TODAY'S DATE:  6/25/19  Subjective:    Emily Cohn is a 38 y.o. male who presents today for wound check. Wound Etiology :  pressure ulcer: Stage III  Wound Location :  midline and on left of a large pannus Pain : {3/10     Abx : Absent       Overall Wound Assessment  Wound is has slightly improved. Size has decreased    Objective:    BP (!) 142/72   Pulse 78   Temp 98 °F (36.7 °C) (Oral)   Resp 14   Wound 05/07/12 Other (Comment) Leg Left (Active)   Number of days: 2605       Wound 06/18/19 Abdomen Mid;Lower #1 mid lower aquired 5/24/19 (Active)   Wound Image   6/18/2019  3:07 PM   Wound Non-Healing Surgical 6/18/2019  3:07 PM   Dressing Status Clean;Dry; Intact 6/25/2019  3:07 PM   Dressing Changed Changed/New 6/25/2019  3:07 PM   Dressing/Treatment Moist to dry 6/25/2019  3:07 PM   Wound Cleansed Wound cleanser 6/25/2019  3:07 PM   Wound Length (cm) 7 cm 6/25/2019  2:10 PM   Wound Width (cm) 4 cm 6/25/2019  2:10 PM   Wound Depth (cm) 0.6 cm 6/25/2019  2:10 PM   Wound Surface Area (cm^2) 28 cm^2 6/25/2019  2:10 PM   Change in Wound Size % (l*w) -8.02 6/25/2019  2:10 PM   Wound Volume (cm^3) 16.8 cm^3 6/25/2019  2:10 PM   Wound Healing % 19 6/25/2019  2:10 PM   Post-Procedure Length (cm) 7 cm 6/25/2019  3:01 PM   Post-Procedure Width (cm) 4 cm 6/25/2019  3:01 PM   Post-Procedure Depth (cm) 0.7 cm 6/25/2019  3:01 PM   Post-Procedure Surface Area (cm^2) 28 cm^2 6/25/2019  3:01 PM   Post-Procedure Volume (cm^3) 19.6 cm^3 6/25/2019  3:01 PM   Wound Assessment Pink;Red; White;Yellow 6/25/2019  2:10 PM   Drainage Amount Large 6/25/2019  2:10 PM   Drainage Description Serous 6/25/2019  2:10 PM   Odor None 6/25/2019  2:10 PM   Yoselin-wound Assessment Maceration; White 6/25/2019  2:10 PM   Number of days: 7       Wound 06/18/19 Abdomen Left; Lower #2 left lower quad aquired 5/24/19 (Active)   Wound Image   6/18/2019  3:07 PM Wound Non-Healing Surgical 6/18/2019  3:07 PM   Dressing Status Clean;Dry; Intact 6/25/2019  3:07 PM   Dressing Changed Changed/New 6/25/2019  3:07 PM   Dressing/Treatment Moist to dry 6/25/2019  3:07 PM   Wound Cleansed Wound cleanser 6/25/2019  3:07 PM   Wound Length (cm) 0.7 cm 6/25/2019  2:10 PM   Wound Width (cm) 0.4 cm 6/25/2019  2:10 PM   Wound Depth (cm) 0.7 cm 6/25/2019  2:10 PM   Wound Surface Area (cm^2) 0.28 cm^2 6/25/2019  2:10 PM   Change in Wound Size % (l*w) 85.86 6/25/2019  2:10 PM   Wound Volume (cm^3) 0.2 cm^3 6/25/2019  2:10 PM   Wound Healing % 91 6/25/2019  2:10 PM   Post-Procedure Length (cm) 0.7 cm 6/25/2019  3:01 PM   Post-Procedure Width (cm) 0.4 cm 6/25/2019  3:01 PM   Post-Procedure Depth (cm) 0.9 cm 6/25/2019  3:01 PM   Post-Procedure Surface Area (cm^2) 0.28 cm^2 6/25/2019  3:01 PM   Post-Procedure Volume (cm^3) 0.25 cm^3 6/25/2019  3:01 PM   Undermining Starts ___ O'Clock 12 6/18/2019  3:07 PM   Undermining Ends___ O'Clock 12 6/18/2019  3:07 PM   Undermining Maxium Distance (cm) 1.2 6/18/2019  3:07 PM   Wound Assessment RAMIREZ 6/25/2019  2:10 PM   Drainage Amount Moderate 6/25/2019  2:10 PM   Drainage Description Serous 6/25/2019  2:10 PM   Odor None 6/25/2019  2:10 PM   Yoselin-wound Assessment Pink 6/25/2019  2:10 PM   Number of days: 7     Wound   serous exudate noted  Errythema - Present    (this wound was originally measured on:)            Measurements shown are from today's visit. Wound 06/18/19 Abdomen Mid;Lower #1 mid lower aquired 5/24/19-Wound Assessment: Pink, Red, White, Yellow  Wound 06/18/19 Abdomen Left; Lower #2 left lower quad aquired 5/24/19-Wound Assessment: Unable to assess     Wound 06/18/19 Abdomen Mid;Lower #1 mid lower aquired 5/24/19-Wound Assessment: Pink, Red, White, Yellow  Wound 06/18/19 Abdomen Left; Lower #2 left lower quad aquired 5/24/19-Wound Assessment: Unable to assess  Wound 06/18/19 Abdomen Mid;Lower #1 mid lower aquired 5/24/19-Yoselin-wound Assessment: Maceration, White  Wound 06/18/19 Abdomen Left; Lower #2 left lower quad aquired 5/24/19-Yoselin-wound Assessment: Pink  Wound 06/18/19 Abdomen Mid;Lower #1 mid lower aquired 5/24/19-Drainage Amount: Large  Wound 06/18/19 Abdomen Left; Lower #2 left lower quad aquired 5/24/19-Drainage Amount: Moderate  Wound 06/18/19 Abdomen Mid;Lower #1 mid lower aquired 5/24/19-Drainage Description: Serous  Wound 06/18/19 Abdomen Left; Lower #2 left lower quad aquired 5/24/19-Drainage Description: Serous  Wound 06/18/19 Abdomen Mid;Lower #1 mid lower aquired 5/24/19-Odor: None  Wound 06/18/19 Abdomen Left; Lower #2 left lower quad aquired 5/24/19-Odor: None         Wound 05/07/12 Other (Comment) Leg Left (Active)   Number of days: 2605       Wound 06/18/19 Abdomen Mid;Lower #1 mid lower aquired 5/24/19 (Active)   Wound Image   6/18/2019  3:07 PM   Wound Non-Healing Surgical 6/18/2019  3:07 PM   Dressing Status Clean;Dry; Intact 6/25/2019  3:07 PM   Dressing Changed Changed/New 6/25/2019  3:07 PM   Dressing/Treatment Moist to dry 6/25/2019  3:07 PM   Wound Cleansed Wound cleanser 6/25/2019  3:07 PM   Wound Length (cm) 7 cm 6/25/2019  2:10 PM   Wound Width (cm) 4 cm 6/25/2019  2:10 PM   Wound Depth (cm) 0.6 cm 6/25/2019  2:10 PM   Wound Surface Area (cm^2) 28 cm^2 6/25/2019  2:10 PM   Change in Wound Size % (l*w) -8.02 6/25/2019  2:10 PM   Wound Volume (cm^3) 16.8 cm^3 6/25/2019  2:10 PM   Wound Healing % 19 6/25/2019  2:10 PM   Post-Procedure Length (cm) 7 cm 6/25/2019  3:01 PM   Post-Procedure Width (cm) 4 cm 6/25/2019  3:01 PM   Post-Procedure Depth (cm) 0.7 cm 6/25/2019  3:01 PM   Post-Procedure Surface Area (cm^2) 28 cm^2 6/25/2019  3:01 PM   Post-Procedure Volume (cm^3) 19.6 cm^3 6/25/2019  3:01 PM   Wound Assessment Pink;Red; White;Yellow 6/25/2019  2:10 PM   Drainage Amount Large 6/25/2019  2:10 PM   Drainage Description Serous 6/25/2019  2:10 PM   Odor None 6/25/2019  2:10 PM   Yoselin-wound Assessment Maceration; White 6/25/2019  2:10 PM   Number of days: 7       Wound 06/18/19 Abdomen Left; Lower #2 left lower quad aquired 5/24/19 (Active)   Wound Image   6/18/2019  3:07 PM   Wound Non-Healing Surgical 6/18/2019  3:07 PM   Dressing Status Clean;Dry; Intact 6/25/2019  3:07 PM   Dressing Changed Changed/New 6/25/2019  3:07 PM   Dressing/Treatment Moist to dry 6/25/2019  3:07 PM   Wound Cleansed Wound cleanser 6/25/2019  3:07 PM   Wound Length (cm) 0.7 cm 6/25/2019  2:10 PM   Wound Width (cm) 0.4 cm 6/25/2019  2:10 PM   Wound Depth (cm) 0.7 cm 6/25/2019  2:10 PM   Wound Surface Area (cm^2) 0.28 cm^2 6/25/2019  2:10 PM   Change in Wound Size % (l*w) 85.86 6/25/2019  2:10 PM   Wound Volume (cm^3) 0.2 cm^3 6/25/2019  2:10 PM   Wound Healing % 91 6/25/2019  2:10 PM   Post-Procedure Length (cm) 0.7 cm 6/25/2019  3:01 PM   Post-Procedure Width (cm) 0.4 cm 6/25/2019  3:01 PM   Post-Procedure Depth (cm) 0.9 cm 6/25/2019  3:01 PM   Post-Procedure Surface Area (cm^2) 0.28 cm^2 6/25/2019  3:01 PM   Post-Procedure Volume (cm^3) 0.25 cm^3 6/25/2019  3:01 PM   Undermining Starts ___ O'Clock 12 6/18/2019  3:07 PM   Undermining Ends___ O'Clock 12 6/18/2019  3:07 PM   Undermining Maxium Distance (cm) 1.2 6/18/2019  3:07 PM   Wound Assessment RAMIREZ 6/25/2019  2:10 PM   Drainage Amount Moderate 6/25/2019  2:10 PM   Drainage Description Serous 6/25/2019  2:10 PM   Odor None 6/25/2019  2:10 PM   Yoselin-wound Assessment Pink 6/25/2019  2:10 PM   Number of days: 7       Assessment:     Please refer to nursing measurements and assessment regarding wound size pre and post debridement. Wound check - Care provided includes removal of existing dressing and visual inspection    Procedure: Debridement Note: Wound # 1,2. At 28.28 cm sq. The patient was placed in the sitting position. Lidocaine  gauze was applied  at beginning of wound evaluation. An  Excisional Debridement was performed. Using a curette ,  the wound was debrided sharply of all fibrotic, necrotic, and hyperkeratotic tissue, including a layer of surrounding healthy tissue to stimulate epithelization. The wound was excised through the level of the subcutaneous Wound Percentage debrided is 50%. Please refer to Nurses notes for pre and post debridement dimensions. Wound was irrigated with normal saline solution. Bleeding was with a small amount of bleeding, and controlled with pressure. Patient tolerated procedure well and was given proper instruction. Diagnosis: pressure ulcer: Stage III                      Patient Active Problem List   Diagnosis Code    Edema R60.9    Morbid obesity ([facility] [address] E66.01    Excessive sleepiness G47.10    Asthma, moderate persistent, poorly-controlled J45.40    MS (multiple sclerosis) ([facility]) G35    Fatigue R53.83    Numbness and tingling R20.0, R20.2    HERBERT on CPAP G47.33, Z99.89    Anxiety F41.9    Abdominal wall cellulitis L03.311    Open wound of abdomen S31.109A    Essential hypertension I10    Hypomagnesemia E83.42    Macrocytic anemia D53.9           Plan:      Treatment & Plan: 1.Excisional Debridement                              2. Alginate                              3. Discussed appropriate home care of this wound. 4. Patient instructions were given. 5. Follow Up in 1 week(s).                                      Brantley Blizzard, DO                           6/25/19     4:39 PM

## 2019-07-31 NOTE — PROGRESS NOTES
[facility]    Attending Physician Statement:  Lj Martin DO    I have discussed the case, including pertinent history and exam findings with the resident. I agree with the assessment, plan and orders as documented by the resident. Patient here for routine follow up of medical problems. Remainder of medical problems as per resident note.
every 6 hours as needed for Wheezing 1 Inhaler 3    metoprolol tartrate (LOPRESSOR) 50 MG tablet Take 1 tablet by mouth 2 times daily (Patient taking differently: Take 50 mg by mouth daily ) 180 tablet 0    omeprazole (PRILOSEC) 20 MG delayed release capsule Take 1 capsule by mouth Daily 90 capsule 0    ibuprofen (ADVIL;MOTRIN) 600 MG tablet Take 1 tablet by mouth every 6 hours as needed for Pain 120 tablet 1    dimethyl Fumarate (TECFIDERA) 240 MG delayed release capsule Take 1 capsule by mouth 2 times daily 60 capsule 11    OXYGEN Inhale 5 L into the lungs continuous       Respiratory Therapy Supplies WINSTON by Does not apply route. 1 Device 0     No current facility-administered medications on file prior to visit. Review of Systems   Constitutional: Negative for chills, fatigue and fever. HENT: Negative for congestion, hearing loss, rhinorrhea and sore throat. Eyes: Negative for photophobia and visual disturbance. Respiratory: Negative for cough, shortness of breath and wheezing. Cardiovascular: Negative for chest pain, palpitations and leg swelling. Gastrointestinal: Negative for constipation, diarrhea, nausea and vomiting. Skin: Negative for rash. Neurological: Negative for dizziness, weakness and numbness. Objective:  Vitals:    07/31/19 1323 07/31/19 1333   BP: (!) 159/86 (!) 156/90   Pulse: 81    Temp: 97.3 °F (36.3 °C)    TempSrc: Oral    SpO2: 95%    Weight: (!) 375 lb (170.1 kg)    Height: 5' 11\" (1.803 m)      Physical Exam   Constitutional: He is oriented to person, place, and time. No distress. Morbidly obese   HENT:   Head: Normocephalic and atraumatic. Eyes: Conjunctivae and EOM are normal. No scleral icterus. Neck: Normal range of motion. Pulmonary/Chest: Effort normal. No respiratory distress. Abdominal: He exhibits no distension. Pannus/excess skin visible below the lower border of his shirt. Musculoskeletal: He exhibits no edema.    Neurological:

## 2019-08-16 NOTE — PROGRESS NOTES
PM   Yoselin-wound Assessment Pink 8/16/2019  3:44 PM   Number of days: 59       Wound 06/18/19 Abdomen Left; Lower #2 left lower quad aquired 5/24/19 (Active)   Wound Image   6/18/2019  3:07 PM   Wound Non-Healing Surgical 6/18/2019  3:07 PM   Dressing Status Clean;Dry; Intact 8/16/2019  4:41 PM   Dressing Changed Changed/New 8/16/2019  4:41 PM   Dressing/Treatment Alginate 8/16/2019  4:41 PM   Wound Cleansed Rinsed/Irrigated with saline 8/16/2019  4:41 PM   Wound Length (cm) 0.5 cm 8/16/2019  3:44 PM   Wound Width (cm) 0.2 cm 8/16/2019  3:44 PM   Wound Depth (cm) 1 cm 8/16/2019  3:44 PM   Wound Surface Area (cm^2) 0.1 cm^2 8/16/2019  3:44 PM   Change in Wound Size % (l*w) 94.95 8/16/2019  3:44 PM   Wound Volume (cm^3) 0.1 cm^3 8/16/2019  3:44 PM   Wound Healing % 95 8/16/2019  3:44 PM   Post-Procedure Length (cm) 0.5 cm 8/16/2019  4:16 PM   Post-Procedure Width (cm) 0.2 cm 8/16/2019  4:16 PM   Post-Procedure Depth (cm) 1 cm 8/16/2019  4:16 PM   Post-Procedure Surface Area (cm^2) 0.1 cm^2 8/16/2019  4:16 PM   Post-Procedure Volume (cm^3) 0.1 cm^3 8/16/2019  4:16 PM   Undermining Starts ___ O'Clock 12 6/18/2019  3:07 PM   Undermining Ends___ O'Clock 12 6/18/2019  3:07 PM   Undermining Maxium Distance (cm) 1.2 6/18/2019  3:07 PM   Wound Assessment RAMIREZ 8/16/2019  3:44 PM   Drainage Amount Small 8/16/2019  3:44 PM   Drainage Description Serous 8/16/2019  3:44 PM   Odor None 8/16/2019  3:44 PM   Yoselin-wound Assessment Pink 8/16/2019  3:44 PM   Number of days: 59       Assessment:     Please refer to nursing measurements and assessment regarding wound size pre and post debridement. Wound check - Care provided includes removal of existing dressing and visual inspection    Procedure: Debridement Note: Wound # 1,2. At 27.1 cm sq. The patient was placed in the sitting position. Lidocaine  gauze was applied  at beginning of wound evaluation. An  Excisional Debridement was performed. Using a curette ,  the wound was debrided

## 2019-09-08 NOTE — ED PROVIDER NOTES
Boston Higgins is a 56-year-old male with a past medical history significant for multiple sclerosis who presents with chief complaint of laceration. History comes primarily from the patient. Silviano Coburn states that he was playing with his dog late last night/early yesterday morning when he tripped over his dog and fell forward, landing on a glass table. He broke this table, and sustained a laceration to his left wrist.  A substantial amount of blood was lost from this wound, however he was able to bandage it himself and control the bleeding to the point of hemostasis. After nearly a day, he became concerned that further treatment may be necessary to treat and dress this wound, he therefore presented to [facility] for emergency department for further evaluation and treatment. On arrival, he is assessed with history, physical exam, imaging studies, laboratory studies, vital signs. His vital signs are stable on arrival.           Review of Systems   Constitutional: Negative for chills and fever. Respiratory: Negative for shortness of breath and wheezing. Cardiovascular: Negative for chest pain. Gastrointestinal: Negative for abdominal pain, diarrhea, nausea and vomiting. Musculoskeletal: Negative for arthralgias. Skin: Positive for wound (As described). Negative for rash. Neurological: Negative for dizziness and weakness. Psychiatric/Behavioral: Negative for confusion. Physical Exam   Constitutional: He appears well-developed and well-nourished. No distress. Morbidly obese   HENT:   Head: Normocephalic and atraumatic. Mouth/Throat: Oropharynx is clear and moist. Abnormal dentition. Eyes: Pupils are equal, round, and reactive to light. EOM are normal.   Neck: Normal range of motion. No JVD present. No tracheal deviation present. Cardiovascular: Regular rhythm. Exam reveals no gallop and no friction rub. No murmur heard. Pulmonary/Chest: Effort normal and breath sounds normal. No stridor.  No [unfilled]  I have reviewed and discussed the case, including pertinent history (medical, surgical, family and social) and exam findings with the Midlevel and the Nurse assigned to Roddy Mathis. I have personally performed and/or participated in the history, exam, medical decision making, and procedures and agree with all pertinent clinical information. I have reviewed my findings and recommendations with Roddy Mathis and members of family present at the time of disposition. My findings/plan: Patient is a 56-year-old male who presents with a chief complaint of left wrist pain and laceration. Patient states that he tripped over his dog yesterday and fell on an outstretched hand. The patient sustained a laceration to the left hand/wrist area. Patient states that he is up-to-date on his tetanus vaccination. He has been dressing the wound, but still complains of the left wrist pain so came in for further evaluation. No treatment prior to arrival.  Patient denies any numbness or tingling the extremity. On examination the patient is in no acute distress. Clear breath sounds in all lung fields. No acute respiratory stress. Regular rate and rhythm of the heart. No abdominal tenderness palpation. Patient does have some tenderness of the left wrist.  Decreased range of motion secondary to pain. There is a portion of the skin that is missing with no foreign body or bone exposure. The patient's x-ray did not reveal any acute ab normality. Wound was cleansed and will heal by secondary intention. The wound does not approximate well, but has been open for about 24 hours. The patient will continue wound care management and allow the wound to heal.  He will return if he has worsening symptoms. No further questions at this time.     Cintia Hays DO      [MS]      ED Course User Index  [MS] Reuben Hubbard DO  [RK] Jaun Esteban DO

## 2019-09-10 NOTE — CARE COORDINATION
Ambulatory Care Coordination ED Follow up Call  Left voice message for patient at [phone] (H)(M) identified myself RN with [facility] ([facility] calling to see how patient is feeling after being discharged from the ED. Requested patient please return the call today. Provided contact information.      Reason for ED Visit:  Fall  Diagnosis:  Laceration of left hand without foreign body, initial encounter  Care Management Risk Score:  5

## 2020-01-01 ENCOUNTER — APPOINTMENT (OUTPATIENT)
Dept: GENERAL RADIOLOGY | Age: 45
DRG: 377 | End: 2020-01-01
Payer: MEDICARE

## 2020-01-01 ENCOUNTER — TELEPHONE (OUTPATIENT)
Dept: INTERNAL MEDICINE CLINIC | Age: 45
End: 2020-01-01

## 2020-01-01 ENCOUNTER — HOSPITAL ENCOUNTER (OUTPATIENT)
Age: 45
Discharge: HOME | End: 2020-01-24
Payer: MEDICARE

## 2020-01-01 ENCOUNTER — HOSPITAL ENCOUNTER (INPATIENT)
Age: 45
LOS: 7 days | Discharge: OTHER INSTITUTION | DRG: 377 | End: 2020-02-11
Attending: EMERGENCY MEDICINE | Admitting: INTERNAL MEDICINE
Payer: MEDICARE

## 2020-01-01 ENCOUNTER — APPOINTMENT (OUTPATIENT)
Dept: ULTRASOUND IMAGING | Age: 45
DRG: 377 | End: 2020-01-01
Payer: MEDICARE

## 2020-01-01 ENCOUNTER — OFFICE VISIT (OUTPATIENT)
Dept: INTERNAL MEDICINE CLINIC | Age: 45
End: 2020-01-01
Payer: MEDICARE

## 2020-01-01 ENCOUNTER — ANESTHESIA (OUTPATIENT)
Dept: ENDOSCOPY | Age: 45
DRG: 377 | End: 2020-01-01
Payer: MEDICARE

## 2020-01-01 ENCOUNTER — ANESTHESIA EVENT (OUTPATIENT)
Dept: ENDOSCOPY | Age: 45
DRG: 377 | End: 2020-01-01
Payer: MEDICARE

## 2020-01-01 VITALS
HEART RATE: 83 BPM | BODY MASS INDEX: 44.1 KG/M2 | OXYGEN SATURATION: 95 % | DIASTOLIC BLOOD PRESSURE: 60 MMHG | HEIGHT: 71 IN | TEMPERATURE: 97.7 F | WEIGHT: 315 LBS | RESPIRATION RATE: 16 BRPM | SYSTOLIC BLOOD PRESSURE: 131 MMHG

## 2020-01-01 VITALS
SYSTOLIC BLOOD PRESSURE: 106 MMHG | HEIGHT: 70 IN | TEMPERATURE: 97.2 F | DIASTOLIC BLOOD PRESSURE: 48 MMHG | OXYGEN SATURATION: 93 % | BODY MASS INDEX: 53.81 KG/M2 | HEART RATE: 86 BPM

## 2020-01-01 VITALS — SYSTOLIC BLOOD PRESSURE: 99 MMHG | DIASTOLIC BLOOD PRESSURE: 65 MMHG

## 2020-01-01 LAB
ABO/RH: NORMAL
ACETAMINOPHEN LEVEL: <5 MCG/ML (ref 10–30)
ADENOVIRUS BY PCR: NOT DETECTED
ALBUMIN SERPL-MCNC: 2.6 G/DL (ref 3.5–5.2)
ALBUMIN SERPL-MCNC: 2.6 G/DL (ref 3.5–5.2)
ALBUMIN SERPL-MCNC: 2.7 G/DL (ref 3.5–5.2)
ALBUMIN SERPL-MCNC: 2.7 G/DL (ref 3.5–5.2)
ALBUMIN SERPL-MCNC: 2.9 G/DL (ref 3.5–5.2)
ALBUMIN SERPL-MCNC: 3.4 G/DL (ref 3.5–5.2)
ALP BLD-CCNC: 187 U/L (ref 40–129)
ALP BLD-CCNC: 190 U/L (ref 40–129)
ALP BLD-CCNC: 195 U/L (ref 40–129)
ALP BLD-CCNC: 195 U/L (ref 40–129)
ALP BLD-CCNC: 207 U/L (ref 40–129)
ALP BLD-CCNC: 223 U/L (ref 40–129)
ALP BLD-CCNC: 224 U/L (ref 40–129)
ALP BLD-CCNC: 229 U/L (ref 40–129)
ALP BLD-CCNC: 244 U/L (ref 40–129)
ALT SERPL-CCNC: 22 U/L (ref 0–40)
ALT SERPL-CCNC: 23 U/L (ref 0–40)
ALT SERPL-CCNC: 25 U/L (ref 0–40)
ALT SERPL-CCNC: 28 U/L (ref 0–40)
ALT SERPL-CCNC: 30 U/L (ref 0–40)
ALT SERPL-CCNC: 33 U/L (ref 0–40)
ALT SERPL-CCNC: 60 U/L (ref 0–40)
AMMONIA: 37 UMOL/L (ref 16–60)
AMORPHOUS: ABNORMAL
AMPHETAMINE SCREEN, URINE: NOT DETECTED
ANION GAP SERPL CALCULATED.3IONS-SCNC: 10 MMOL/L (ref 7–16)
ANION GAP SERPL CALCULATED.3IONS-SCNC: 10 MMOL/L (ref 7–16)
ANION GAP SERPL CALCULATED.3IONS-SCNC: 11 MMOL/L (ref 7–16)
ANION GAP SERPL CALCULATED.3IONS-SCNC: 12 MMOL/L (ref 7–16)
ANION GAP SERPL CALCULATED.3IONS-SCNC: 14 MMOL/L (ref 7–16)
ANION GAP SERPL CALCULATED.3IONS-SCNC: 16 MMOL/L (ref 7–16)
ANION GAP SERPL CALCULATED.3IONS-SCNC: 19 MMOL/L (ref 7–16)
ANION GAP SERPL CALCULATED.3IONS-SCNC: 22 MMOL/L (ref 7–16)
ANION GAP SERPL CALCULATED.3IONS-SCNC: 8 MMOL/L (ref 7–16)
ANION GAP SERPL CALCULATED.3IONS-SCNC: 9 MMOL/L (ref 7–16)
ANISOCYTOSIS: ABNORMAL
ANTI-MITOCHONDRIAL AB, IFA: NEGATIVE
ANTI-NUCLEAR ANTIBODY (ANA): NEGATIVE
ANTIBODY IDENTIFICATION: NORMAL
ANTIBODY SCREEN: NORMAL
ANTIBODY SCREEN: NORMAL
AST SERPL-CCNC: 104 U/L (ref 0–39)
AST SERPL-CCNC: 108 U/L (ref 0–39)
AST SERPL-CCNC: 108 U/L (ref 0–39)
AST SERPL-CCNC: 115 U/L (ref 0–39)
AST SERPL-CCNC: 116 U/L (ref 0–39)
AST SERPL-CCNC: 118 U/L (ref 0–39)
AST SERPL-CCNC: 72 U/L (ref 0–39)
AST SERPL-CCNC: 72 U/L (ref 0–39)
AST SERPL-CCNC: 77 U/L (ref 0–39)
B.E.: 0 MMOL/L (ref -3–3)
B.E.: 0.8 MMOL/L (ref -3–3)
B.E.: 1 MMOL/L (ref -3–3)
B.E.: 2.1 MMOL/L (ref -3–3)
B.E.: 3.2 MMOL/L (ref -3–3)
BACTERIA: ABNORMAL /HPF
BARBITURATE SCREEN URINE: NOT DETECTED
BASOPHILS ABSOLUTE: 0 E9/L (ref 0–0.2)
BASOPHILS ABSOLUTE: 0.02 E9/L (ref 0–0.2)
BASOPHILS ABSOLUTE: 0.05 E9/L (ref 0–0.2)
BASOPHILS ABSOLUTE: 0.07 E9/L (ref 0–0.2)
BASOPHILS ABSOLUTE: 0.08 E9/L (ref 0–0.2)
BASOPHILS ABSOLUTE: 0.09 E9/L (ref 0–0.2)
BASOPHILS ABSOLUTE: 0.09 E9/L (ref 0–0.2)
BASOPHILS RELATIVE PERCENT: 0 % (ref 0–2)
BASOPHILS RELATIVE PERCENT: 0.3 % (ref 0–2)
BASOPHILS RELATIVE PERCENT: 0.5 % (ref 0–2)
BASOPHILS RELATIVE PERCENT: 0.6 % (ref 0–2)
BASOPHILS RELATIVE PERCENT: 0.9 % (ref 0–2)
BASOPHILS RELATIVE PERCENT: 1.8 % (ref 0–2)
BASOPHILS RELATIVE PERCENT: 2.6 % (ref 0–2)
BASOPHILS RELATIVE PERCENT: 2.7 % (ref 0–2)
BASOPHILS RELATIVE PERCENT: 2.8 % (ref 0–2)
BASOPHILS RELATIVE PERCENT: 3 % (ref 0–2)
BENZODIAZEPINE SCREEN, URINE: NOT DETECTED
BILIRUB SERPL-MCNC: 1.7 MG/DL (ref 0–1.2)
BILIRUB SERPL-MCNC: 1.9 MG/DL (ref 0–1.2)
BILIRUB SERPL-MCNC: 2 MG/DL (ref 0–1.2)
BILIRUB SERPL-MCNC: 2.4 MG/DL (ref 0–1.2)
BILIRUB SERPL-MCNC: 2.4 MG/DL (ref 0–1.2)
BILIRUB SERPL-MCNC: 2.5 MG/DL (ref 0–1.2)
BILIRUB SERPL-MCNC: 2.6 MG/DL (ref 0–1.2)
BILIRUB SERPL-MCNC: 2.7 MG/DL (ref 0–1.2)
BILIRUB SERPL-MCNC: 2.9 MG/DL (ref 0–1.2)
BILIRUBIN URINE: ABNORMAL
BLOOD BANK DISPENSE STATUS: NORMAL
BLOOD BANK PRODUCT CODE: NORMAL
BLOOD CULTURE, ROUTINE: NORMAL
BLOOD CULTURE, ROUTINE: NORMAL
BLOOD, URINE: ABNORMAL
BORDETELLA PARAPERTUSSIS BY PCR: NOT DETECTED
BORDETELLA PERTUSSIS BY PCR: NOT DETECTED
BPU ID: NORMAL
BUN BLDV-MCNC: 15 MG/DL (ref 6–20)
BUN BLDV-MCNC: 18 MG/DL (ref 6–20)
BUN BLDV-MCNC: 19 MG/DL (ref 6–20)
BUN BLDV-MCNC: 21 MG/DL (ref 6–20)
BUN BLDV-MCNC: 23 MG/DL (ref 6–20)
BUN BLDV-MCNC: 23 MG/DL (ref 6–20)
BUN BLDV-MCNC: 25 MG/DL (ref 6–20)
BUN BLDV-MCNC: 41 MG/DL (ref 6–20)
CALCIUM SERPL-MCNC: 8.1 MG/DL (ref 8.6–10.2)
CALCIUM SERPL-MCNC: 8.1 MG/DL (ref 8.6–10.2)
CALCIUM SERPL-MCNC: 8.2 MG/DL (ref 8.6–10.2)
CALCIUM SERPL-MCNC: 8.3 MG/DL (ref 8.6–10.2)
CALCIUM SERPL-MCNC: 8.3 MG/DL (ref 8.6–10.2)
CALCIUM SERPL-MCNC: 8.4 MG/DL (ref 8.6–10.2)
CALCIUM SERPL-MCNC: 8.6 MG/DL (ref 8.6–10.2)
CALCIUM SERPL-MCNC: 8.6 MG/DL (ref 8.6–10.2)
CANNABINOID SCREEN URINE: NOT DETECTED
CASTS: ABNORMAL /LPF
CERULOPLASMIN: 26 MG/DL (ref 15–30)
CHLAMYDOPHILIA PNEUMONIAE BY PCR: NOT DETECTED
CHLORIDE BLD-SCNC: 101 MMOL/L (ref 98–107)
CHLORIDE BLD-SCNC: 102 MMOL/L (ref 98–107)
CHLORIDE BLD-SCNC: 103 MMOL/L (ref 98–107)
CHLORIDE BLD-SCNC: 104 MMOL/L (ref 98–107)
CHLORIDE BLD-SCNC: 108 MMOL/L (ref 98–107)
CHLORIDE BLD-SCNC: 82 MMOL/L (ref 98–107)
CHLORIDE BLD-SCNC: 91 MMOL/L (ref 98–107)
CHLORIDE BLD-SCNC: 96 MMOL/L (ref 98–107)
CLARITY: CLEAR
CO2: 17 MMOL/L (ref 22–29)
CO2: 21 MMOL/L (ref 22–29)
CO2: 22 MMOL/L (ref 22–29)
CO2: 25 MMOL/L (ref 22–29)
CO2: 26 MMOL/L (ref 22–29)
CO2: 26 MMOL/L (ref 22–29)
CO2: 27 MMOL/L (ref 22–29)
CO2: 27 MMOL/L (ref 22–29)
CO2: 28 MMOL/L (ref 22–29)
CO2: 29 MMOL/L (ref 22–29)
COCAINE METABOLITE SCREEN URINE: NOT DETECTED
COHB: 0.2 % (ref 0–1.5)
COHB: 0.3 % (ref 0–1.5)
COHB: 0.3 % (ref 0–1.5)
COHB: 0.6 % (ref 0–1.5)
COHB: 0.8 % (ref 0–1.5)
COLOR: YELLOW
CORONAVIRUS 229E BY PCR: NOT DETECTED
CORONAVIRUS HKU1 BY PCR: NOT DETECTED
CORONAVIRUS NL63 BY PCR: NOT DETECTED
CORONAVIRUS OC43 BY PCR: NOT DETECTED
CREAT SERPL-MCNC: 0.7 MG/DL (ref 0.7–1.2)
CREAT SERPL-MCNC: 0.8 MG/DL (ref 0.7–1.2)
CREAT SERPL-MCNC: 0.9 MG/DL (ref 0.7–1.2)
CREAT SERPL-MCNC: 1 MG/DL (ref 0.7–1.2)
CREAT SERPL-MCNC: 1.1 MG/DL (ref 0.7–1.2)
CREAT SERPL-MCNC: 1.1 MG/DL (ref 0.7–1.2)
CREAT SERPL-MCNC: 1.2 MG/DL (ref 0.7–1.2)
CREAT SERPL-MCNC: 1.2 MG/DL (ref 0.7–1.2)
CREAT SERPL-MCNC: 1.4 MG/DL (ref 0.7–1.2)
CREAT SERPL-MCNC: 1.9 MG/DL (ref 0.7–1.2)
CRITICAL: ABNORMAL
CULTURE, BLOOD 2: NORMAL
CULTURE, BLOOD 2: NORMAL
DAT C3: NORMAL
DAT IGG: NORMAL
DAT POLYSPECIFIC: NORMAL
DATE ANALYZED: ABNORMAL
DATE OF COLLECTION: ABNORMAL
DESCRIPTION BLOOD BANK: NORMAL
DR. NOTIFY: NORMAL
EKG ATRIAL RATE: 78 BPM
EKG P AXIS: 74 DEGREES
EKG P-R INTERVAL: 184 MS
EKG Q-T INTERVAL: 366 MS
EKG QRS DURATION: 62 MS
EKG QTC CALCULATION (BAZETT): 417 MS
EKG R AXIS: 17 DEGREES
EKG T AXIS: 50 DEGREES
EKG VENTRICULAR RATE: 78 BPM
ELUATE ANTIBODY IDENTIFICATION: NORMAL
EOSINOPHILS ABSOLUTE: 0 E9/L (ref 0.05–0.5)
EOSINOPHILS ABSOLUTE: 0.04 E9/L (ref 0.05–0.5)
EOSINOPHILS ABSOLUTE: 0.04 E9/L (ref 0.05–0.5)
EOSINOPHILS ABSOLUTE: 0.09 E9/L (ref 0.05–0.5)
EOSINOPHILS ABSOLUTE: 0.11 E9/L (ref 0.05–0.5)
EOSINOPHILS ABSOLUTE: 0.12 E9/L (ref 0.05–0.5)
EOSINOPHILS ABSOLUTE: 0.15 E9/L (ref 0.05–0.5)
EOSINOPHILS ABSOLUTE: 0.23 E9/L (ref 0.05–0.5)
EOSINOPHILS ABSOLUTE: 0.27 E9/L (ref 0.05–0.5)
EOSINOPHILS ABSOLUTE: 0.36 E9/L (ref 0.05–0.5)
EOSINOPHILS RELATIVE PERCENT: 0.6 % (ref 0–6)
EOSINOPHILS RELATIVE PERCENT: 0.9 % (ref 0–6)
EOSINOPHILS RELATIVE PERCENT: 0.9 % (ref 0–6)
EOSINOPHILS RELATIVE PERCENT: 10 % (ref 0–6)
EOSINOPHILS RELATIVE PERCENT: 14.2 % (ref 0–6)
EOSINOPHILS RELATIVE PERCENT: 3.5 % (ref 0–6)
EOSINOPHILS RELATIVE PERCENT: 3.5 % (ref 0–6)
EOSINOPHILS RELATIVE PERCENT: 6.4 % (ref 0–6)
EOSINOPHILS RELATIVE PERCENT: 7 % (ref 0–6)
EOSINOPHILS RELATIVE PERCENT: 7.1 % (ref 0–6)
EPITHELIAL CELLS, UA: ABNORMAL /HPF
ETHANOL: <10 MG/DL (ref 0–0.08)
FENTANYL SCREEN, URINE: NOT DETECTED
FERRITIN: 909 NG/ML
FIO2: 40 %
FIO2: 40 %
FIO2: 55 %
FOLATE: 7 NG/ML (ref 4.8–24.2)
GFR AFRICAN AMERICAN: 47
GFR AFRICAN AMERICAN: >60
GFR NON-AFRICAN AMERICAN: 39 ML/MIN/1.73
GFR NON-AFRICAN AMERICAN: 55 ML/MIN/1.73
GFR NON-AFRICAN AMERICAN: >60 ML/MIN/1.73
GLUCOSE BLD-MCNC: 101 MG/DL (ref 74–99)
GLUCOSE BLD-MCNC: 103 MG/DL (ref 74–99)
GLUCOSE BLD-MCNC: 109 MG/DL (ref 74–99)
GLUCOSE BLD-MCNC: 114 MG/DL (ref 74–99)
GLUCOSE BLD-MCNC: 127 MG/DL (ref 74–99)
GLUCOSE BLD-MCNC: 158 MG/DL (ref 74–99)
GLUCOSE BLD-MCNC: 89 MG/DL (ref 74–99)
GLUCOSE BLD-MCNC: 91 MG/DL (ref 74–99)
GLUCOSE BLD-MCNC: 94 MG/DL (ref 74–99)
GLUCOSE BLD-MCNC: 97 MG/DL (ref 74–99)
GLUCOSE URINE: 100 MG/DL
HAV IGM SER IA-ACNC: NORMAL
HCO3: 26.3 MMOL/L (ref 22–26)
HCO3: 26.8 MMOL/L (ref 22–26)
HCO3: 27.5 MMOL/L (ref 22–26)
HCO3: 28.1 MMOL/L (ref 22–26)
HCO3: 28.7 MMOL/L (ref 22–26)
HCT VFR BLD CALC: 20 % (ref 37–54)
HCT VFR BLD CALC: 20.7 % (ref 37–54)
HCT VFR BLD CALC: 21.3 % (ref 37–54)
HCT VFR BLD CALC: 22.1 % (ref 37–54)
HCT VFR BLD CALC: 22.9 % (ref 37–54)
HCT VFR BLD CALC: 23.3 % (ref 37–54)
HCT VFR BLD CALC: 23.3 % (ref 37–54)
HCT VFR BLD CALC: 23.4 % (ref 37–54)
HCT VFR BLD CALC: 23.9 % (ref 37–54)
HCT VFR BLD CALC: 24.2 % (ref 37–54)
HCT VFR BLD CALC: 24.5 % (ref 37–54)
HCT VFR BLD CALC: 25.3 % (ref 37–54)
HCT VFR BLD CALC: 25.6 % (ref 37–54)
HCT VFR BLD CALC: 25.7 % (ref 37–54)
HEMOGLOBIN: 6.6 G/DL (ref 12.5–16.5)
HEMOGLOBIN: 6.7 G/DL (ref 12.5–16.5)
HEMOGLOBIN: 7 G/DL (ref 12.5–16.5)
HEMOGLOBIN: 7.1 G/DL (ref 12.5–16.5)
HEMOGLOBIN: 7.4 G/DL (ref 12.5–16.5)
HEMOGLOBIN: 7.5 G/DL (ref 12.5–16.5)
HEMOGLOBIN: 7.6 G/DL (ref 12.5–16.5)
HEMOGLOBIN: 7.6 G/DL (ref 12.5–16.5)
HEMOGLOBIN: 7.7 G/DL (ref 12.5–16.5)
HEMOGLOBIN: 7.8 G/DL (ref 12.5–16.5)
HEMOGLOBIN: 7.9 G/DL (ref 12.5–16.5)
HEMOGLOBIN: 8.3 G/DL (ref 12.5–16.5)
HEPATITIS B CORE IGM ANTIBODY: NORMAL
HEPATITIS B SURFACE ANTIGEN INTERPRETATION: NORMAL
HEPATITIS C ANTIBODY INTERPRETATION: NORMAL
HHB: 1.4 % (ref 0–5)
HHB: 2.3 % (ref 0–5)
HHB: 2.5 % (ref 0–5)
HHB: 3.4 % (ref 0–5)
HHB: 3.5 % (ref 0–5)
HUMAN METAPNEUMOVIRUS BY PCR: NOT DETECTED
HUMAN RHINOVIRUS/ENTEROVIRUS BY PCR: NOT DETECTED
HYPOCHROMIA: ABNORMAL
INFLUENZA A BY PCR: NOT DETECTED
INFLUENZA A BY PCR: NOT DETECTED
INFLUENZA B BY PCR: NOT DETECTED
INFLUENZA B BY PCR: NOT DETECTED
INR BLD: 1.3
INR BLD: 1.3
INR BLD: 1.5
INR BLD: 1.6
KETONES, URINE: ABNORMAL MG/DL
L. PNEUMOPHILA SEROGP 1 UR AG: NORMAL
LAB: ABNORMAL
LACTIC ACID, SEPSIS: 1.3 MMOL/L (ref 0.5–1.9)
LACTIC ACID: 2.6 MMOL/L (ref 0.5–2.2)
LACTIC ACID: 2.8 MMOL/L (ref 0.5–2.2)
LEUKOCYTE ESTERASE, URINE: NEGATIVE
LV EF: 65 %
LVEF MODALITY: NORMAL
LYMPHOCYTES ABSOLUTE: 0 E9/L (ref 1.5–4)
LYMPHOCYTES ABSOLUTE: 0.05 E9/L (ref 1.5–4)
LYMPHOCYTES ABSOLUTE: 0.12 E9/L (ref 1.5–4)
LYMPHOCYTES ABSOLUTE: 0.13 E9/L (ref 1.5–4)
LYMPHOCYTES ABSOLUTE: 0.13 E9/L (ref 1.5–4)
LYMPHOCYTES ABSOLUTE: 0.14 E9/L (ref 1.5–4)
LYMPHOCYTES ABSOLUTE: 0.14 E9/L (ref 1.5–4)
LYMPHOCYTES ABSOLUTE: 0.15 E9/L (ref 1.5–4)
LYMPHOCYTES ABSOLUTE: 0.16 E9/L (ref 1.5–4)
LYMPHOCYTES ABSOLUTE: 0.25 E9/L (ref 1.5–4)
LYMPHOCYTES RELATIVE PERCENT: 0.9 % (ref 20–42)
LYMPHOCYTES RELATIVE PERCENT: 12 % (ref 20–42)
LYMPHOCYTES RELATIVE PERCENT: 2.4 % (ref 20–42)
LYMPHOCYTES RELATIVE PERCENT: 2.6 % (ref 20–42)
LYMPHOCYTES RELATIVE PERCENT: 4.4 % (ref 20–42)
LYMPHOCYTES RELATIVE PERCENT: 5 % (ref 20–42)
LYMPHOCYTES RELATIVE PERCENT: 5.2 % (ref 20–42)
LYMPHOCYTES RELATIVE PERCENT: 5.3 % (ref 20–42)
LYMPHOCYTES RELATIVE PERCENT: 5.3 % (ref 20–42)
LYMPHOCYTES RELATIVE PERCENT: 8.3 % (ref 20–42)
Lab: ABNORMAL
Lab: NORMAL
MACRO-OVALOCYTES: ABNORMAL
MCH RBC QN AUTO: 34.5 PG (ref 26–35)
MCH RBC QN AUTO: 34.7 PG (ref 26–35)
MCH RBC QN AUTO: 34.7 PG (ref 26–35)
MCH RBC QN AUTO: 35.1 PG (ref 26–35)
MCH RBC QN AUTO: 35.2 PG (ref 26–35)
MCH RBC QN AUTO: 35.7 PG (ref 26–35)
MCH RBC QN AUTO: 36.5 PG (ref 26–35)
MCH RBC QN AUTO: 36.6 PG (ref 26–35)
MCH RBC QN AUTO: 36.7 PG (ref 26–35)
MCH RBC QN AUTO: 36.8 PG (ref 26–35)
MCHC RBC AUTO-ENTMCNC: 30 % (ref 32–34.5)
MCHC RBC AUTO-ENTMCNC: 30.9 % (ref 32–34.5)
MCHC RBC AUTO-ENTMCNC: 31 % (ref 32–34.5)
MCHC RBC AUTO-ENTMCNC: 31 % (ref 32–34.5)
MCHC RBC AUTO-ENTMCNC: 32.2 % (ref 32–34.5)
MCHC RBC AUTO-ENTMCNC: 32.6 % (ref 32–34.5)
MCHC RBC AUTO-ENTMCNC: 32.8 % (ref 32–34.5)
MCHC RBC AUTO-ENTMCNC: 32.8 % (ref 32–34.5)
MCHC RBC AUTO-ENTMCNC: 32.9 % (ref 32–34.5)
MCHC RBC AUTO-ENTMCNC: 33 % (ref 32–34.5)
MCV RBC AUTO: 105.9 FL (ref 80–99.9)
MCV RBC AUTO: 106 FL (ref 80–99.9)
MCV RBC AUTO: 111.1 FL (ref 80–99.9)
MCV RBC AUTO: 112 FL (ref 80–99.9)
MCV RBC AUTO: 112.1 FL (ref 80–99.9)
MCV RBC AUTO: 113.7 FL (ref 80–99.9)
MCV RBC AUTO: 113.8 FL (ref 80–99.9)
MCV RBC AUTO: 113.8 FL (ref 80–99.9)
MCV RBC AUTO: 115 FL (ref 80–99.9)
MCV RBC AUTO: 115.2 FL (ref 80–99.9)
METAMYELOCYTES RELATIVE PERCENT: 2 % (ref 0–1)
METHADONE SCREEN, URINE: NOT DETECTED
METHB: 0.3 % (ref 0–1.5)
METHB: 0.4 % (ref 0–1.5)
METHB: 0.6 % (ref 0–1.5)
MODE: ABNORMAL
MONOCYTES ABSOLUTE: 0.1 E9/L (ref 0.1–0.95)
MONOCYTES ABSOLUTE: 0.12 E9/L (ref 0.1–0.95)
MONOCYTES ABSOLUTE: 0.15 E9/L (ref 0.1–0.95)
MONOCYTES ABSOLUTE: 0.16 E9/L (ref 0.1–0.95)
MONOCYTES ABSOLUTE: 0.16 E9/L (ref 0.1–0.95)
MONOCYTES ABSOLUTE: 0.2 E9/L (ref 0.1–0.95)
MONOCYTES ABSOLUTE: 0.27 E9/L (ref 0.1–0.95)
MONOCYTES ABSOLUTE: 0.4 E9/L (ref 0.1–0.95)
MONOCYTES RELATIVE PERCENT: 1.8 % (ref 2–12)
MONOCYTES RELATIVE PERCENT: 10 % (ref 2–12)
MONOCYTES RELATIVE PERCENT: 13 % (ref 2–12)
MONOCYTES RELATIVE PERCENT: 15.9 % (ref 2–12)
MONOCYTES RELATIVE PERCENT: 2.7 % (ref 2–12)
MONOCYTES RELATIVE PERCENT: 3.5 % (ref 2–12)
MONOCYTES RELATIVE PERCENT: 5.3 % (ref 2–12)
MONOCYTES RELATIVE PERCENT: 6.2 % (ref 2–12)
MONOCYTES RELATIVE PERCENT: 8.3 % (ref 2–12)
MONOCYTES RELATIVE PERCENT: 9.6 % (ref 2–12)
MYCOPLASMA PNEUMONIAE BY PCR: NOT DETECTED
MYELOCYTE PERCENT: 0.9 % (ref 0–0)
MYELOCYTE PERCENT: 0.9 % (ref 0–0)
MYELOCYTE PERCENT: 1 % (ref 0–0)
MYELOCYTE PERCENT: 1.8 % (ref 0–0)
NEUTROPHILS ABSOLUTE: 1.41 E9/L (ref 1.8–7.3)
NEUTROPHILS ABSOLUTE: 1.43 E9/L (ref 1.8–7.3)
NEUTROPHILS ABSOLUTE: 1.6 E9/L (ref 1.8–7.3)
NEUTROPHILS ABSOLUTE: 1.94 E9/L (ref 1.8–7.3)
NEUTROPHILS ABSOLUTE: 2.13 E9/L (ref 1.8–7.3)
NEUTROPHILS ABSOLUTE: 2.64 E9/L (ref 1.8–7.3)
NEUTROPHILS ABSOLUTE: 2.75 E9/L (ref 1.8–7.3)
NEUTROPHILS ABSOLUTE: 3.63 E9/L (ref 1.8–7.3)
NEUTROPHILS ABSOLUTE: 3.98 E9/L (ref 1.8–7.3)
NEUTROPHILS ABSOLUTE: 4.61 E9/L (ref 1.8–7.3)
NEUTROPHILS RELATIVE PERCENT: 62.8 % (ref 43–80)
NEUTROPHILS RELATIVE PERCENT: 65 % (ref 43–80)
NEUTROPHILS RELATIVE PERCENT: 72 % (ref 43–80)
NEUTROPHILS RELATIVE PERCENT: 72.5 % (ref 43–80)
NEUTROPHILS RELATIVE PERCENT: 78.3 % (ref 43–80)
NEUTROPHILS RELATIVE PERCENT: 79.6 % (ref 43–80)
NEUTROPHILS RELATIVE PERCENT: 86 % (ref 43–80)
NEUTROPHILS RELATIVE PERCENT: 93 % (ref 43–80)
NEUTROPHILS RELATIVE PERCENT: 95.6 % (ref 43–80)
NEUTROPHILS RELATIVE PERCENT: 96.5 % (ref 43–80)
NITRITE, URINE: NEGATIVE
NUCLEATED RED BLOOD CELLS: 1 /100 WBC
NUCLEATED RED BLOOD CELLS: 1.7 /100 WBC
NUCLEATED RED BLOOD CELLS: 1.8 /100 WBC
O2 CONTENT: 11.2 ML/DL
O2 CONTENT: 11.4 ML/DL
O2 CONTENT: 11.9 ML/DL
O2 CONTENT: 12 ML/DL
O2 CONTENT: 12.7 ML/DL
O2 SATURATION: 96.5 % (ref 92–98.5)
O2 SATURATION: 96.6 % (ref 92–98.5)
O2 SATURATION: 97.5 % (ref 92–98.5)
O2 SATURATION: 97.7 % (ref 92–98.5)
O2 SATURATION: 98.6 % (ref 92–98.5)
O2HB: 95.4 % (ref 94–97)
O2HB: 96 % (ref 94–97)
O2HB: 96.6 % (ref 94–97)
O2HB: 97.1 % (ref 94–97)
O2HB: 97.7 % (ref 94–97)
OPERATOR ID: 3
OPERATOR ID: 797
OPERATOR ID: 797
OPERATOR ID: ABNORMAL
OPERATOR ID: ABNORMAL
OPIATE SCREEN URINE: NOT DETECTED
ORGANISM: ABNORMAL
OVALOCYTES: ABNORMAL
OXYCODONE URINE: NOT DETECTED
PARAINFLUENZA VIRUS 1 BY PCR: NOT DETECTED
PARAINFLUENZA VIRUS 2 BY PCR: NOT DETECTED
PARAINFLUENZA VIRUS 3 BY PCR: NOT DETECTED
PARAINFLUENZA VIRUS 4 BY PCR: NOT DETECTED
PATIENT TEMP: 37 C
PCO2: 45.1 MMHG (ref 35–45)
PCO2: 48.8 MMHG (ref 35–45)
PCO2: 50.2 MMHG (ref 35–45)
PCO2: 51.8 MMHG (ref 35–45)
PCO2: 60.6 MMHG (ref 35–45)
PDW BLD-RTO: 15.5 FL (ref 11.5–15)
PDW BLD-RTO: 16.6 FL (ref 11.5–15)
PDW BLD-RTO: 17.8 FL (ref 11.5–15)
PDW BLD-RTO: 18.6 FL (ref 11.5–15)
PDW BLD-RTO: 18.6 FL (ref 11.5–15)
PDW BLD-RTO: 18.7 FL (ref 11.5–15)
PDW BLD-RTO: 18.7 FL (ref 11.5–15)
PDW BLD-RTO: 18.8 FL (ref 11.5–15)
PDW BLD-RTO: 18.9 FL (ref 11.5–15)
PDW BLD-RTO: 20.7 FL (ref 11.5–15)
PEEP/CPAP: 6 CMH2O
PEEP/CPAP: 6 CMH2O
PFO2: 2.18 MMHG/%
PFO2: 2.53 MMHG/%
PFO2: 2.58 MMHG/%
PH BLOOD GAS: 7.27 (ref 7.35–7.45)
PH BLOOD GAS: 7.34 (ref 7.35–7.45)
PH BLOOD GAS: 7.35 (ref 7.35–7.45)
PH BLOOD GAS: 7.38 (ref 7.35–7.45)
PH BLOOD GAS: 7.39 (ref 7.35–7.45)
PH UA: 5 (ref 5–9)
PHENCYCLIDINE SCREEN URINE: NOT DETECTED
PIP: 14 CMH2O
PLATELET # BLD: 100 E9/L (ref 130–450)
PLATELET # BLD: 113 E9/L (ref 130–450)
PLATELET # BLD: 132 E9/L (ref 130–450)
PLATELET # BLD: 152 E9/L (ref 130–450)
PLATELET # BLD: 178 E9/L (ref 130–450)
PLATELET # BLD: 198 E9/L (ref 130–450)
PLATELET # BLD: 80 E9/L (ref 130–450)
PLATELET # BLD: 86 E9/L (ref 130–450)
PLATELET # BLD: 90 E9/L (ref 130–450)
PLATELET # BLD: 96 E9/L (ref 130–450)
PLATELET CONFIRMATION: NORMAL
PMV BLD AUTO: 10.4 FL (ref 7–12)
PMV BLD AUTO: 10.4 FL (ref 7–12)
PMV BLD AUTO: 10.5 FL (ref 7–12)
PMV BLD AUTO: 10.5 FL (ref 7–12)
PMV BLD AUTO: 10.6 FL (ref 7–12)
PMV BLD AUTO: 10.8 FL (ref 7–12)
PMV BLD AUTO: 10.8 FL (ref 7–12)
PMV BLD AUTO: 9.7 FL (ref 7–12)
PO2: 101 MMHG (ref 75–100)
PO2: 101.4 MMHG (ref 75–100)
PO2: 107.3 MMHG (ref 75–100)
PO2: 141.7 MMHG (ref 75–100)
PO2: 87.3 MMHG (ref 75–100)
POIKILOCYTES: ABNORMAL
POLYCHROMASIA: ABNORMAL
POTASSIUM REFLEX MAGNESIUM: 4.6 MMOL/L (ref 3.5–5)
POTASSIUM REFLEX MAGNESIUM: 5.1 MMOL/L (ref 3.5–5)
POTASSIUM SERPL-SCNC: 4.4 MMOL/L (ref 3.5–5)
POTASSIUM SERPL-SCNC: 4.6 MMOL/L (ref 3.5–5)
POTASSIUM SERPL-SCNC: 4.7 MMOL/L (ref 3.5–5)
POTASSIUM SERPL-SCNC: 4.8 MMOL/L (ref 3.5–5)
POTASSIUM SERPL-SCNC: 4.9 MMOL/L (ref 3.5–5)
POTASSIUM SERPL-SCNC: 5 MMOL/L (ref 3.5–5)
POTASSIUM, UR: 22.5 MMOL/L
PROCALCITONIN: 0.56 NG/ML (ref 0–0.08)
PROTEIN UA: 30 MG/DL
PROTHROMBIN TIME: 14.8 SEC (ref 9.3–12.4)
PROTHROMBIN TIME: 15.1 SEC (ref 9.3–12.4)
PROTHROMBIN TIME: 17.1 SEC (ref 9.3–12.4)
PROTHROMBIN TIME: 18.6 SEC (ref 9.3–12.4)
PS: 14 CMH20
RBC # BLD: 1.8 E12/L (ref 3.8–5.8)
RBC # BLD: 1.9 E12/L (ref 3.8–5.8)
RBC # BLD: 2.05 E12/L (ref 3.8–5.8)
RBC # BLD: 2.08 E12/L (ref 3.8–5.8)
RBC # BLD: 2.1 E12/L (ref 3.8–5.8)
RBC # BLD: 2.13 E12/L (ref 3.8–5.8)
RBC # BLD: 2.16 E12/L (ref 3.8–5.8)
RBC # BLD: 2.23 E12/L (ref 3.8–5.8)
RBC # BLD: 2.25 E12/L (ref 3.8–5.8)
RBC # BLD: 2.39 E12/L (ref 3.8–5.8)
RBC UA: ABNORMAL /HPF (ref 0–2)
REASON FOR REJECTION: NORMAL
REASON FOR REJECTION: NORMAL
REJECTED TEST: NORMAL
REJECTED TEST: NORMAL
RESPIRATORY SYNCYTIAL VIRUS BY PCR: NOT DETECTED
RI(T): 1.13
RI(T): 1.29
RI(T): 1.56
ROULEAUX: ABNORMAL
SALICYLATE, SERUM: <0.3 MG/DL (ref 0–30)
SMOOTH MUSCLE ANTIBODY: NEGATIVE
SODIUM BLD-SCNC: 121 MMOL/L (ref 132–146)
SODIUM BLD-SCNC: 131 MMOL/L (ref 132–146)
SODIUM BLD-SCNC: 134 MMOL/L (ref 132–146)
SODIUM BLD-SCNC: 138 MMOL/L (ref 132–146)
SODIUM BLD-SCNC: 138 MMOL/L (ref 132–146)
SODIUM BLD-SCNC: 140 MMOL/L (ref 132–146)
SODIUM BLD-SCNC: 140 MMOL/L (ref 132–146)
SODIUM BLD-SCNC: 141 MMOL/L (ref 132–146)
SODIUM BLD-SCNC: 143 MMOL/L (ref 132–146)
SODIUM BLD-SCNC: 146 MMOL/L (ref 132–146)
SODIUM URINE: <20 MMOL/L
SOURCE, BLOOD GAS: ABNORMAL
SPECIFIC GRAVITY UA: 1.02 (ref 1–1.03)
STOMATOCYTES: ABNORMAL
STREP PNEUMONIAE ANTIGEN, URINE: NORMAL
TARGET CELLS: ABNORMAL
THB: 7.9 G/DL (ref 11.5–16.5)
THB: 8.2 G/DL (ref 11.5–16.5)
THB: 8.7 G/DL (ref 11.5–16.5)
THB: 8.8 G/DL (ref 11.5–16.5)
THB: 9.3 G/DL (ref 11.5–16.5)
TIME ANALYZED: 1226
TIME ANALYZED: 1914
TIME ANALYZED: 511
TIME ANALYZED: 527
TIME ANALYZED: 536
TOTAL PROTEIN: 6.9 G/DL (ref 6.4–8.3)
TOTAL PROTEIN: 7 G/DL (ref 6.4–8.3)
TOTAL PROTEIN: 7.3 G/DL (ref 6.4–8.3)
TOTAL PROTEIN: 7.4 G/DL (ref 6.4–8.3)
TOTAL PROTEIN: 7.5 G/DL (ref 6.4–8.3)
TOTAL PROTEIN: 7.6 G/DL (ref 6.4–8.3)
TOTAL PROTEIN: 7.9 G/DL (ref 6.4–8.3)
TROPONIN: 0.07 NG/ML (ref 0–0.03)
TROPONIN: 0.08 NG/ML (ref 0–0.03)
TROPONIN: 0.1 NG/ML (ref 0–0.03)
TROPONIN: 0.11 NG/ML (ref 0–0.03)
TROPONIN: 0.18 NG/ML (ref 0–0.03)
URINE CULTURE, ROUTINE: NORMAL
UROBILINOGEN, URINE: 4 E.U./DL
VANCOMYCIN TROUGH: 25.6 MCG/ML (ref 5–16)
VITAMIN B-12: 575 PG/ML (ref 211–946)
WBC # BLD: 1.9 E9/L (ref 4.5–11.5)
WBC # BLD: 2.1 E9/L (ref 4.5–11.5)
WBC # BLD: 2.5 E9/L (ref 4.5–11.5)
WBC # BLD: 2.7 E9/L (ref 4.5–11.5)
WBC # BLD: 2.7 E9/L (ref 4.5–11.5)
WBC # BLD: 3.2 E9/L (ref 4.5–11.5)
WBC # BLD: 3.3 E9/L (ref 4.5–11.5)
WBC # BLD: 3.9 E9/L (ref 4.5–11.5)
WBC # BLD: 4.1 E9/L (ref 4.5–11.5)
WBC # BLD: 4.8 E9/L (ref 4.5–11.5)
WBC UA: ABNORMAL /HPF (ref 0–5)

## 2020-01-01 PROCEDURE — 2580000003 HC RX 258: Performed by: INTERNAL MEDICINE

## 2020-01-01 PROCEDURE — 2580000003 HC RX 258: Performed by: NURSE PRACTITIONER

## 2020-01-01 PROCEDURE — 76705 ECHO EXAM OF ABDOMEN: CPT

## 2020-01-01 PROCEDURE — 6360000002 HC RX W HCPCS: Performed by: NURSE PRACTITIONER

## 2020-01-01 PROCEDURE — 2060000000 HC ICU INTERMEDIATE R&B

## 2020-01-01 PROCEDURE — 6370000000 HC RX 637 (ALT 250 FOR IP): Performed by: NURSE PRACTITIONER

## 2020-01-01 PROCEDURE — 93306 TTE W/DOPPLER COMPLETE: CPT

## 2020-01-01 PROCEDURE — 36415 COLL VENOUS BLD VENIPUNCTURE: CPT

## 2020-01-01 PROCEDURE — 80202 ASSAY OF VANCOMYCIN: CPT

## 2020-01-01 PROCEDURE — 6370000000 HC RX 637 (ALT 250 FOR IP): Performed by: INTERNAL MEDICINE

## 2020-01-01 PROCEDURE — 85018 HEMOGLOBIN: CPT

## 2020-01-01 PROCEDURE — 36600 WITHDRAWAL OF ARTERIAL BLOOD: CPT

## 2020-01-01 PROCEDURE — 71045 X-RAY EXAM CHEST 1 VIEW: CPT

## 2020-01-01 PROCEDURE — 36430 TRANSFUSION BLD/BLD COMPNT: CPT

## 2020-01-01 PROCEDURE — 86901 BLOOD TYPING SEROLOGIC RH(D): CPT

## 2020-01-01 PROCEDURE — 86923 COMPATIBILITY TEST ELECTRIC: CPT

## 2020-01-01 PROCEDURE — 92610 EVALUATE SWALLOWING FUNCTION: CPT

## 2020-01-01 PROCEDURE — 84484 ASSAY OF TROPONIN QUANT: CPT

## 2020-01-01 PROCEDURE — 86880 COOMBS TEST DIRECT: CPT

## 2020-01-01 PROCEDURE — G8484 FLU IMMUNIZE NO ADMIN: HCPCS | Performed by: FAMILY MEDICINE

## 2020-01-01 PROCEDURE — 82728 ASSAY OF FERRITIN: CPT

## 2020-01-01 PROCEDURE — 6370000000 HC RX 637 (ALT 250 FOR IP): Performed by: STUDENT IN AN ORGANIZED HEALTH CARE EDUCATION/TRAINING PROGRAM

## 2020-01-01 PROCEDURE — C9113 INJ PANTOPRAZOLE SODIUM, VIA: HCPCS | Performed by: NURSE PRACTITIONER

## 2020-01-01 PROCEDURE — 94640 AIRWAY INHALATION TREATMENT: CPT

## 2020-01-01 PROCEDURE — 82140 ASSAY OF AMMONIA: CPT

## 2020-01-01 PROCEDURE — 97166 OT EVAL MOD COMPLEX 45 MIN: CPT

## 2020-01-01 PROCEDURE — 80053 COMPREHEN METABOLIC PANEL: CPT

## 2020-01-01 PROCEDURE — 87502 INFLUENZA DNA AMP PROBE: CPT

## 2020-01-01 PROCEDURE — 2580000003 HC RX 258: Performed by: EMERGENCY MEDICINE

## 2020-01-01 PROCEDURE — 6360000002 HC RX W HCPCS: Performed by: INTERNAL MEDICINE

## 2020-01-01 PROCEDURE — 97530 THERAPEUTIC ACTIVITIES: CPT

## 2020-01-01 PROCEDURE — G8417 CALC BMI ABV UP PARAM F/U: HCPCS | Performed by: FAMILY MEDICINE

## 2020-01-01 PROCEDURE — 2700000000 HC OXYGEN THERAPY PER DAY

## 2020-01-01 PROCEDURE — 99231 SBSQ HOSP IP/OBS SF/LOW 25: CPT | Performed by: INTERNAL MEDICINE

## 2020-01-01 PROCEDURE — 97530 THERAPEUTIC ACTIVITIES: CPT | Performed by: PHYSICAL THERAPIST

## 2020-01-01 PROCEDURE — 94660 CPAP INITIATION&MGMT: CPT

## 2020-01-01 PROCEDURE — 84133 ASSAY OF URINE POTASSIUM: CPT

## 2020-01-01 PROCEDURE — 83605 ASSAY OF LACTIC ACID: CPT

## 2020-01-01 PROCEDURE — 2580000003 HC RX 258

## 2020-01-01 PROCEDURE — 80307 DRUG TEST PRSMV CHEM ANLYZR: CPT

## 2020-01-01 PROCEDURE — 85610 PROTHROMBIN TIME: CPT

## 2020-01-01 PROCEDURE — 82805 BLOOD GASES W/O2 SATURATION: CPT

## 2020-01-01 PROCEDURE — 87040 BLOOD CULTURE FOR BACTERIA: CPT

## 2020-01-01 PROCEDURE — 3609017100 HC EGD: Performed by: INTERNAL MEDICINE

## 2020-01-01 PROCEDURE — 85014 HEMATOCRIT: CPT

## 2020-01-01 PROCEDURE — 97110 THERAPEUTIC EXERCISES: CPT

## 2020-01-01 PROCEDURE — 93010 ELECTROCARDIOGRAM REPORT: CPT | Performed by: INTERNAL MEDICINE

## 2020-01-01 PROCEDURE — 97535 SELF CARE MNGMENT TRAINING: CPT

## 2020-01-01 PROCEDURE — 85025 COMPLETE CBC W/AUTO DIFF WBC: CPT

## 2020-01-01 PROCEDURE — 86850 RBC ANTIBODY SCREEN: CPT

## 2020-01-01 PROCEDURE — 99222 1ST HOSP IP/OBS MODERATE 55: CPT | Performed by: SURGERY

## 2020-01-01 PROCEDURE — 93005 ELECTROCARDIOGRAM TRACING: CPT | Performed by: EMERGENCY MEDICINE

## 2020-01-01 PROCEDURE — 2709999900 HC NON-CHARGEABLE SUPPLY: Performed by: INTERNAL MEDICINE

## 2020-01-01 PROCEDURE — 99222 1ST HOSP IP/OBS MODERATE 55: CPT | Performed by: INTERNAL MEDICINE

## 2020-01-01 PROCEDURE — 71046 X-RAY EXAM CHEST 2 VIEWS: CPT

## 2020-01-01 PROCEDURE — 0100U HC RESPIRPTHGN MULT REV TRANS & AMP PRB TECH 21 TRGT: CPT

## 2020-01-01 PROCEDURE — 81001 URINALYSIS AUTO W/SCOPE: CPT

## 2020-01-01 PROCEDURE — 86900 BLOOD TYPING SEROLOGIC ABO: CPT

## 2020-01-01 PROCEDURE — APPSS60 APP SPLIT SHARED TIME 46-60 MINUTES: Performed by: PHYSICIAN ASSISTANT

## 2020-01-01 PROCEDURE — 82746 ASSAY OF FOLIC ACID SERUM: CPT

## 2020-01-01 PROCEDURE — 99285 EMERGENCY DEPT VISIT HI MDM: CPT

## 2020-01-01 PROCEDURE — 82607 VITAMIN B-12: CPT

## 2020-01-01 PROCEDURE — G0480 DRUG TEST DEF 1-7 CLASSES: HCPCS

## 2020-01-01 PROCEDURE — 92526 ORAL FUNCTION THERAPY: CPT | Performed by: SPEECH-LANGUAGE PATHOLOGIST

## 2020-01-01 PROCEDURE — 80074 ACUTE HEPATITIS PANEL: CPT

## 2020-01-01 PROCEDURE — 84145 PROCALCITONIN (PCT): CPT

## 2020-01-01 PROCEDURE — 82390 ASSAY OF CERULOPLASMIN: CPT

## 2020-01-01 PROCEDURE — 87081 CULTURE SCREEN ONLY: CPT

## 2020-01-01 PROCEDURE — 86860 RBC ANTIBODY ELUTION: CPT

## 2020-01-01 PROCEDURE — 3700000000 HC ANESTHESIA ATTENDED CARE: Performed by: INTERNAL MEDICINE

## 2020-01-01 PROCEDURE — P9016 RBC LEUKOCYTES REDUCED: HCPCS

## 2020-01-01 PROCEDURE — 86255 FLUORESCENT ANTIBODY SCREEN: CPT

## 2020-01-01 PROCEDURE — 86870 RBC ANTIBODY IDENTIFICATION: CPT

## 2020-01-01 PROCEDURE — 86038 ANTINUCLEAR ANTIBODIES: CPT

## 2020-01-01 PROCEDURE — 80048 BASIC METABOLIC PNL TOTAL CA: CPT

## 2020-01-01 PROCEDURE — 0DJ08ZZ INSPECTION OF UPPER INTESTINAL TRACT, VIA NATURAL OR ARTIFICIAL OPENING ENDOSCOPIC: ICD-10-PCS | Performed by: INTERNAL MEDICINE

## 2020-01-01 PROCEDURE — 1036F TOBACCO NON-USER: CPT | Performed by: FAMILY MEDICINE

## 2020-01-01 PROCEDURE — 99214 OFFICE O/P EST MOD 30 MIN: CPT | Performed by: FAMILY MEDICINE

## 2020-01-01 PROCEDURE — G8427 DOCREV CUR MEDS BY ELIG CLIN: HCPCS | Performed by: FAMILY MEDICINE

## 2020-01-01 PROCEDURE — 87450 HC DIRECT STREP B ANTIGEN: CPT

## 2020-01-01 PROCEDURE — 92526 ORAL FUNCTION THERAPY: CPT

## 2020-01-01 PROCEDURE — 2500000003 HC RX 250 WO HCPCS: Performed by: NURSE ANESTHETIST, CERTIFIED REGISTERED

## 2020-01-01 PROCEDURE — 87088 URINE BACTERIA CULTURE: CPT

## 2020-01-01 PROCEDURE — 84300 ASSAY OF URINE SODIUM: CPT

## 2020-01-01 PROCEDURE — 2000000000 HC ICU R&B

## 2020-01-01 PROCEDURE — 99213 OFFICE O/P EST LOW 20 MIN: CPT | Performed by: FAMILY MEDICINE

## 2020-01-01 PROCEDURE — 3700000001 HC ADD 15 MINUTES (ANESTHESIA): Performed by: INTERNAL MEDICINE

## 2020-01-01 PROCEDURE — 86921 COMPATIBILITY TEST INCUBATE: CPT

## 2020-01-01 PROCEDURE — 97161 PT EVAL LOW COMPLEX 20 MIN: CPT | Performed by: PHYSICAL THERAPIST

## 2020-01-01 PROCEDURE — 2580000003 HC RX 258: Performed by: NURSE ANESTHETIST, CERTIFIED REGISTERED

## 2020-01-01 PROCEDURE — 97116 GAIT TRAINING THERAPY: CPT

## 2020-01-01 RX ORDER — FOLIC ACID 1 MG/1
1 TABLET ORAL DAILY
Qty: 30 TABLET | Refills: 3
Start: 2020-01-01

## 2020-01-01 RX ORDER — SODIUM CHLORIDE 0.9 % (FLUSH) 0.9 %
10 SYRINGE (ML) INJECTION PRN
Status: DISCONTINUED | OUTPATIENT
Start: 2020-01-01 | End: 2020-01-01

## 2020-01-01 RX ORDER — PANTOPRAZOLE SODIUM 40 MG/1
40 TABLET, DELAYED RELEASE ORAL
Qty: 30 TABLET | Refills: 3
Start: 2020-01-01

## 2020-01-01 RX ORDER — LORAZEPAM 2 MG/ML
3 INJECTION INTRAMUSCULAR
Status: DISCONTINUED | OUTPATIENT
Start: 2020-01-01 | End: 2020-01-01

## 2020-01-01 RX ORDER — SODIUM CHLORIDE AND POTASSIUM CHLORIDE .9; .15 G/100ML; G/100ML
SOLUTION INTRAVENOUS CONTINUOUS
Status: DISCONTINUED | OUTPATIENT
Start: 2020-01-01 | End: 2020-01-01

## 2020-01-01 RX ORDER — ACETAMINOPHEN 325 MG/1
650 TABLET ORAL EVERY 4 HOURS PRN
Status: DISCONTINUED | OUTPATIENT
Start: 2020-01-01 | End: 2020-01-01 | Stop reason: HOSPADM

## 2020-01-01 RX ORDER — IPRATROPIUM BROMIDE AND ALBUTEROL SULFATE 2.5; .5 MG/3ML; MG/3ML
3 SOLUTION RESPIRATORY (INHALATION) 4 TIMES DAILY
Qty: 360 ML | Refills: 0
Start: 2020-01-01

## 2020-01-01 RX ORDER — M-VIT,TX,IRON,MINS/CALC/FOLIC 27MG-0.4MG
1 TABLET ORAL DAILY
Qty: 30 TABLET | Refills: 0
Start: 2020-01-01

## 2020-01-01 RX ORDER — FOLIC ACID 1 MG/1
1 TABLET ORAL DAILY
Status: DISCONTINUED | OUTPATIENT
Start: 2020-01-01 | End: 2020-01-01 | Stop reason: HOSPADM

## 2020-01-01 RX ORDER — OMEPRAZOLE 20 MG/1
20 CAPSULE, DELAYED RELEASE ORAL DAILY
Status: ON HOLD | COMMUNITY
End: 2020-01-01 | Stop reason: HOSPADM

## 2020-01-01 RX ORDER — M-VIT,TX,IRON,MINS/CALC/FOLIC 27MG-0.4MG
1 TABLET ORAL DAILY
Status: DISCONTINUED | OUTPATIENT
Start: 2020-01-01 | End: 2020-01-01 | Stop reason: HOSPADM

## 2020-01-01 RX ORDER — LORAZEPAM 2 MG/ML
1 INJECTION INTRAMUSCULAR
Status: DISCONTINUED | OUTPATIENT
Start: 2020-01-01 | End: 2020-01-01

## 2020-01-01 RX ORDER — AMLODIPINE BESYLATE 5 MG/1
5 TABLET ORAL DAILY
Status: DISCONTINUED | OUTPATIENT
Start: 2020-01-01 | End: 2020-01-01 | Stop reason: HOSPADM

## 2020-01-01 RX ORDER — LORAZEPAM 1 MG/1
4 TABLET ORAL
Status: DISCONTINUED | OUTPATIENT
Start: 2020-01-01 | End: 2020-01-01

## 2020-01-01 RX ORDER — SPIRONOLACTONE 50 MG/1
50 TABLET, FILM COATED ORAL DAILY
Qty: 30 TABLET | Refills: 3
Start: 2020-01-01

## 2020-01-01 RX ORDER — DIMETHYL FUMARATE 240 MG/1
240 CAPSULE ORAL 2 TIMES DAILY
Status: DISCONTINUED | OUTPATIENT
Start: 2020-01-01 | End: 2020-01-01 | Stop reason: HOSPADM

## 2020-01-01 RX ORDER — THIAMINE HYDROCHLORIDE 100 MG/ML
100 INJECTION, SOLUTION INTRAMUSCULAR; INTRAVENOUS DAILY
Status: DISCONTINUED | OUTPATIENT
Start: 2020-01-01 | End: 2020-01-01 | Stop reason: HOSPADM

## 2020-01-01 RX ORDER — ONDANSETRON 2 MG/ML
4 INJECTION INTRAMUSCULAR; INTRAVENOUS EVERY 6 HOURS PRN
Status: DISCONTINUED | OUTPATIENT
Start: 2020-01-01 | End: 2020-01-01 | Stop reason: HOSPADM

## 2020-01-01 RX ORDER — 0.9 % SODIUM CHLORIDE 0.9 %
20 INTRAVENOUS SOLUTION INTRAVENOUS ONCE
Status: COMPLETED | OUTPATIENT
Start: 2020-01-01 | End: 2020-01-01

## 2020-01-01 RX ORDER — SODIUM CHLORIDE 0.9 % (FLUSH) 0.9 %
10 SYRINGE (ML) INJECTION EVERY 12 HOURS SCHEDULED
Status: DISCONTINUED | OUTPATIENT
Start: 2020-01-01 | End: 2020-01-01

## 2020-01-01 RX ORDER — SODIUM CHLORIDE 9 MG/ML
INJECTION, SOLUTION INTRAVENOUS
Status: COMPLETED
Start: 2020-01-01 | End: 2020-01-01

## 2020-01-01 RX ORDER — LORAZEPAM 2 MG/ML
4 INJECTION INTRAMUSCULAR
Status: DISCONTINUED | OUTPATIENT
Start: 2020-01-01 | End: 2020-01-01

## 2020-01-01 RX ORDER — DOXYCYCLINE HYCLATE 100 MG/1
100 CAPSULE ORAL EVERY 12 HOURS SCHEDULED
Status: DISCONTINUED | OUTPATIENT
Start: 2020-01-01 | End: 2020-01-01

## 2020-01-01 RX ORDER — KETAMINE HYDROCHLORIDE 50 MG/ML
INJECTION, SOLUTION, CONCENTRATE INTRAMUSCULAR; INTRAVENOUS PRN
Status: DISCONTINUED | OUTPATIENT
Start: 2020-01-01 | End: 2020-01-01 | Stop reason: SDUPTHER

## 2020-01-01 RX ORDER — LORAZEPAM 1 MG/1
2 TABLET ORAL
Status: DISCONTINUED | OUTPATIENT
Start: 2020-01-01 | End: 2020-01-01

## 2020-01-01 RX ORDER — LORAZEPAM 1 MG/1
3 TABLET ORAL
Status: DISCONTINUED | OUTPATIENT
Start: 2020-01-01 | End: 2020-01-01

## 2020-01-01 RX ORDER — LORAZEPAM 1 MG/1
1 TABLET ORAL
Status: DISCONTINUED | OUTPATIENT
Start: 2020-01-01 | End: 2020-01-01

## 2020-01-01 RX ORDER — SODIUM CHLORIDE 9 MG/ML
INJECTION, SOLUTION INTRAVENOUS CONTINUOUS PRN
Status: DISCONTINUED | OUTPATIENT
Start: 2020-01-01 | End: 2020-01-01 | Stop reason: SDUPTHER

## 2020-01-01 RX ORDER — PANTOPRAZOLE SODIUM 40 MG/1
40 TABLET, DELAYED RELEASE ORAL
Status: DISCONTINUED | OUTPATIENT
Start: 2020-01-01 | End: 2020-01-01 | Stop reason: HOSPADM

## 2020-01-01 RX ORDER — SODIUM CHLORIDE 9 MG/ML
25 INJECTION, SOLUTION INTRAVENOUS EVERY 8 HOURS
Status: DISCONTINUED | OUTPATIENT
Start: 2020-01-01 | End: 2020-01-01

## 2020-01-01 RX ORDER — SPIRONOLACTONE 25 MG/1
50 TABLET ORAL DAILY
Status: DISCONTINUED | OUTPATIENT
Start: 2020-01-01 | End: 2020-01-01 | Stop reason: HOSPADM

## 2020-01-01 RX ORDER — ALBUTEROL SULFATE 2.5 MG/3ML
2.5 SOLUTION RESPIRATORY (INHALATION) EVERY 6 HOURS PRN
Status: DISCONTINUED | OUTPATIENT
Start: 2020-01-01 | End: 2020-01-01

## 2020-01-01 RX ORDER — LORAZEPAM 2 MG/ML
2 INJECTION INTRAMUSCULAR
Status: DISCONTINUED | OUTPATIENT
Start: 2020-01-01 | End: 2020-01-01

## 2020-01-01 RX ORDER — THIAMINE HYDROCHLORIDE 100 MG/ML
100 INJECTION, SOLUTION INTRAMUSCULAR; INTRAVENOUS DAILY
Qty: 30 SYRINGE | Refills: 0
Start: 2020-01-01

## 2020-01-01 RX ORDER — IPRATROPIUM BROMIDE AND ALBUTEROL SULFATE 2.5; .5 MG/3ML; MG/3ML
1 SOLUTION RESPIRATORY (INHALATION) 4 TIMES DAILY
Status: DISCONTINUED | OUTPATIENT
Start: 2020-01-01 | End: 2020-01-01 | Stop reason: HOSPADM

## 2020-01-01 RX ORDER — IPRATROPIUM BROMIDE AND ALBUTEROL SULFATE 2.5; .5 MG/3ML; MG/3ML
1 SOLUTION RESPIRATORY (INHALATION) EVERY 6 HOURS PRN
Status: DISCONTINUED | OUTPATIENT
Start: 2020-01-01 | End: 2020-01-01

## 2020-01-01 RX ORDER — 0.9 % SODIUM CHLORIDE 0.9 %
1000 INTRAVENOUS SOLUTION INTRAVENOUS ONCE
Status: COMPLETED | OUTPATIENT
Start: 2020-01-01 | End: 2020-01-01

## 2020-01-01 RX ORDER — HALOPERIDOL 5 MG/ML
2 INJECTION INTRAMUSCULAR EVERY 6 HOURS PRN
Status: DISCONTINUED | OUTPATIENT
Start: 2020-01-01 | End: 2020-01-01

## 2020-01-01 RX ORDER — ALBUTEROL SULFATE 90 UG/1
2 AEROSOL, METERED RESPIRATORY (INHALATION) EVERY 6 HOURS PRN
Qty: 1 INHALER | Refills: 3 | Status: SHIPPED | OUTPATIENT
Start: 2020-01-01

## 2020-01-01 RX ORDER — PAROXETINE HYDROCHLORIDE 20 MG/1
20 TABLET, FILM COATED ORAL DAILY
Status: DISCONTINUED | OUTPATIENT
Start: 2020-01-01 | End: 2020-01-01 | Stop reason: HOSPADM

## 2020-01-01 RX ADMIN — MULTIPLE VITAMINS W/ MINERALS TAB 1 TABLET: TAB at 09:47

## 2020-01-01 RX ADMIN — POTASSIUM CHLORIDE AND SODIUM CHLORIDE: 900; 150 INJECTION, SOLUTION INTRAVENOUS at 03:34

## 2020-01-01 RX ADMIN — IPRATROPIUM BROMIDE AND ALBUTEROL SULFATE 1 AMPULE: .5; 3 SOLUTION RESPIRATORY (INHALATION) at 04:44

## 2020-01-01 RX ADMIN — SODIUM CHLORIDE 25 ML: 900 INJECTION INTRAVENOUS at 20:45

## 2020-01-01 RX ADMIN — IPRATROPIUM BROMIDE AND ALBUTEROL SULFATE 1 AMPULE: .5; 3 SOLUTION RESPIRATORY (INHALATION) at 13:25

## 2020-01-01 RX ADMIN — Medication 10 ML: at 20:05

## 2020-01-01 RX ADMIN — IPRATROPIUM BROMIDE AND ALBUTEROL SULFATE 1 AMPULE: .5; 3 SOLUTION RESPIRATORY (INHALATION) at 06:20

## 2020-01-01 RX ADMIN — IPRATROPIUM BROMIDE AND ALBUTEROL SULFATE 1 AMPULE: .5; 3 SOLUTION RESPIRATORY (INHALATION) at 12:57

## 2020-01-01 RX ADMIN — LORAZEPAM 3 MG: 2 INJECTION INTRAMUSCULAR; INTRAVENOUS at 05:15

## 2020-01-01 RX ADMIN — COLLAGENASE SANTYL: 250 OINTMENT TOPICAL at 10:48

## 2020-01-01 RX ADMIN — MUPIROCIN: 20 OINTMENT TOPICAL at 21:12

## 2020-01-01 RX ADMIN — FOLIC ACID 1 MG: 1 TABLET ORAL at 09:58

## 2020-01-01 RX ADMIN — Medication 20 ML: at 04:37

## 2020-01-01 RX ADMIN — SODIUM CHLORIDE 25 ML: 900 INJECTION INTRAVENOUS at 04:02

## 2020-01-01 RX ADMIN — IPRATROPIUM BROMIDE AND ALBUTEROL SULFATE 1 AMPULE: .5; 3 SOLUTION RESPIRATORY (INHALATION) at 09:15

## 2020-01-01 RX ADMIN — AMLODIPINE BESYLATE 5 MG: 5 TABLET ORAL at 08:28

## 2020-01-01 RX ADMIN — SODIUM CHLORIDE 25 ML: 900 INJECTION INTRAVENOUS at 05:14

## 2020-01-01 RX ADMIN — THIAMINE HYDROCHLORIDE 100 MG: 100 INJECTION, SOLUTION INTRAMUSCULAR; INTRAVENOUS at 09:45

## 2020-01-01 RX ADMIN — VANCOMYCIN HYDROCHLORIDE 1750 MG: 10 INJECTION, POWDER, LYOPHILIZED, FOR SOLUTION INTRAVENOUS at 13:17

## 2020-01-01 RX ADMIN — KETAMINE HYDROCHLORIDE 25 MG: 50 INJECTION INTRAMUSCULAR; INTRAVENOUS at 17:25

## 2020-01-01 RX ADMIN — METOPROLOL TARTRATE 50 MG: 25 TABLET ORAL at 21:33

## 2020-01-01 RX ADMIN — THIAMINE HYDROCHLORIDE 100 MG: 100 INJECTION, SOLUTION INTRAMUSCULAR; INTRAVENOUS at 10:49

## 2020-01-01 RX ADMIN — Medication 10 ML: at 21:00

## 2020-01-01 RX ADMIN — COLLAGENASE SANTYL: 250 OINTMENT TOPICAL at 21:12

## 2020-01-01 RX ADMIN — COLLAGENASE SANTYL: 250 OINTMENT TOPICAL at 01:21

## 2020-01-01 RX ADMIN — SPIRONOLACTONE 50 MG: 25 TABLET ORAL at 09:47

## 2020-01-01 RX ADMIN — MUPIROCIN: 20 OINTMENT TOPICAL at 09:59

## 2020-01-01 RX ADMIN — Medication 10 ML: at 09:46

## 2020-01-01 RX ADMIN — PIPERACILLIN AND TAZOBACTAM 3.38 G: 3; .375 INJECTION, POWDER, LYOPHILIZED, FOR SOLUTION INTRAVENOUS at 23:48

## 2020-01-01 RX ADMIN — VANCOMYCIN HYDROCHLORIDE 1750 MG: 10 INJECTION, POWDER, LYOPHILIZED, FOR SOLUTION INTRAVENOUS at 01:54

## 2020-01-01 RX ADMIN — PAROXETINE HYDROCHLORIDE 20 MG: 20 TABLET, FILM COATED ORAL at 08:27

## 2020-01-01 RX ADMIN — IPRATROPIUM BROMIDE AND ALBUTEROL SULFATE 1 AMPULE: .5; 3 SOLUTION RESPIRATORY (INHALATION) at 09:01

## 2020-01-01 RX ADMIN — HALOPERIDOL LACTATE 2 MG: 5 INJECTION, SOLUTION INTRAMUSCULAR at 23:49

## 2020-01-01 RX ADMIN — OCTREOTIDE ACETATE 50 MCG/HR: 500 INJECTION, SOLUTION INTRAVENOUS; SUBCUTANEOUS at 16:53

## 2020-01-01 RX ADMIN — PAROXETINE HYDROCHLORIDE 20 MG: 20 TABLET, FILM COATED ORAL at 09:46

## 2020-01-01 RX ADMIN — IPRATROPIUM BROMIDE AND ALBUTEROL SULFATE 1 AMPULE: .5; 3 SOLUTION RESPIRATORY (INHALATION) at 18:12

## 2020-01-01 RX ADMIN — IPRATROPIUM BROMIDE AND ALBUTEROL SULFATE 1 AMPULE: .5; 3 SOLUTION RESPIRATORY (INHALATION) at 19:29

## 2020-01-01 RX ADMIN — PIPERACILLIN AND TAZOBACTAM 3.38 G: 3; .375 INJECTION, POWDER, LYOPHILIZED, FOR SOLUTION INTRAVENOUS at 16:29

## 2020-01-01 RX ADMIN — MULTIPLE VITAMINS W/ MINERALS TAB 1 TABLET: TAB at 09:58

## 2020-01-01 RX ADMIN — LORAZEPAM 3 MG: 2 INJECTION INTRAMUSCULAR; INTRAVENOUS at 03:13

## 2020-01-01 RX ADMIN — IPRATROPIUM BROMIDE AND ALBUTEROL SULFATE 1 AMPULE: .5; 3 SOLUTION RESPIRATORY (INHALATION) at 06:04

## 2020-01-01 RX ADMIN — IPRATROPIUM BROMIDE AND ALBUTEROL SULFATE 1 AMPULE: .5; 3 SOLUTION RESPIRATORY (INHALATION) at 05:56

## 2020-01-01 RX ADMIN — PIPERACILLIN AND TAZOBACTAM 3.38 G: 3; .375 INJECTION, POWDER, LYOPHILIZED, FOR SOLUTION INTRAVENOUS at 06:40

## 2020-01-01 RX ADMIN — METOPROLOL TARTRATE 50 MG: 25 TABLET ORAL at 08:06

## 2020-01-01 RX ADMIN — IPRATROPIUM BROMIDE AND ALBUTEROL SULFATE 1 AMPULE: .5; 3 SOLUTION RESPIRATORY (INHALATION) at 09:50

## 2020-01-01 RX ADMIN — MULTIPLE VITAMINS W/ MINERALS TAB 1 TABLET: TAB at 10:18

## 2020-01-01 RX ADMIN — SODIUM CHLORIDE 25 ML: 900 INJECTION INTRAVENOUS at 10:39

## 2020-01-01 RX ADMIN — OCTREOTIDE ACETATE 50 MCG/HR: 500 INJECTION, SOLUTION INTRAVENOUS; SUBCUTANEOUS at 03:34

## 2020-01-01 RX ADMIN — IPRATROPIUM BROMIDE AND ALBUTEROL SULFATE 1 AMPULE: .5; 3 SOLUTION RESPIRATORY (INHALATION) at 16:36

## 2020-01-01 RX ADMIN — PIPERACILLIN AND TAZOBACTAM 3.38 G: 3; .375 INJECTION, POWDER, LYOPHILIZED, FOR SOLUTION INTRAVENOUS at 15:30

## 2020-01-01 RX ADMIN — HALOPERIDOL LACTATE 2 MG: 5 INJECTION, SOLUTION INTRAMUSCULAR at 01:18

## 2020-01-01 RX ADMIN — COLLAGENASE SANTYL: 250 OINTMENT TOPICAL at 20:00

## 2020-01-01 RX ADMIN — MULTIPLE VITAMINS W/ MINERALS TAB 1 TABLET: TAB at 08:55

## 2020-01-01 RX ADMIN — THIAMINE HYDROCHLORIDE 100 MG: 100 INJECTION, SOLUTION INTRAMUSCULAR; INTRAVENOUS at 08:28

## 2020-01-01 RX ADMIN — SODIUM CHLORIDE 20 ML: 900 INJECTION, SOLUTION INTRAVENOUS at 04:37

## 2020-01-01 RX ADMIN — Medication 10 ML: at 21:03

## 2020-01-01 RX ADMIN — DOXYCYCLINE HYCLATE 100 MG: 100 CAPSULE ORAL at 10:18

## 2020-01-01 RX ADMIN — Medication 10 ML: at 19:54

## 2020-01-01 RX ADMIN — PAROXETINE HYDROCHLORIDE 20 MG: 20 TABLET, FILM COATED ORAL at 08:28

## 2020-01-01 RX ADMIN — SODIUM CHLORIDE 8 MG/HR: 9 INJECTION, SOLUTION INTRAVENOUS at 16:31

## 2020-01-01 RX ADMIN — SODIUM CHLORIDE 8 MG/HR: 9 INJECTION, SOLUTION INTRAVENOUS at 12:27

## 2020-01-01 RX ADMIN — IPRATROPIUM BROMIDE AND ALBUTEROL SULFATE 1 AMPULE: .5; 3 SOLUTION RESPIRATORY (INHALATION) at 17:00

## 2020-01-01 RX ADMIN — SODIUM CHLORIDE 25 ML: 900 INJECTION INTRAVENOUS at 20:07

## 2020-01-01 RX ADMIN — METOPROLOL TARTRATE 50 MG: 25 TABLET ORAL at 20:00

## 2020-01-01 RX ADMIN — FOLIC ACID 1 MG: 1 TABLET ORAL at 10:18

## 2020-01-01 RX ADMIN — METOPROLOL TARTRATE 50 MG: 25 TABLET ORAL at 08:27

## 2020-01-01 RX ADMIN — PAROXETINE HYDROCHLORIDE 20 MG: 20 TABLET, FILM COATED ORAL at 08:06

## 2020-01-01 RX ADMIN — PIPERACILLIN AND TAZOBACTAM 3.38 G: 3; .375 INJECTION, POWDER, LYOPHILIZED, FOR SOLUTION INTRAVENOUS at 06:23

## 2020-01-01 RX ADMIN — VANCOMYCIN HYDROCHLORIDE 1750 MG: 10 INJECTION, POWDER, LYOPHILIZED, FOR SOLUTION INTRAVENOUS at 14:11

## 2020-01-01 RX ADMIN — PAROXETINE HYDROCHLORIDE 20 MG: 20 TABLET, FILM COATED ORAL at 09:47

## 2020-01-01 RX ADMIN — FOLIC ACID 1 MG: 1 TABLET ORAL at 08:06

## 2020-01-01 RX ADMIN — SODIUM CHLORIDE 25 ML: 900 INJECTION INTRAVENOUS at 19:29

## 2020-01-01 RX ADMIN — PANTOPRAZOLE SODIUM 40 MG: 40 TABLET, DELAYED RELEASE ORAL at 06:40

## 2020-01-01 RX ADMIN — SODIUM CHLORIDE 8 MG/HR: 9 INJECTION, SOLUTION INTRAVENOUS at 04:21

## 2020-01-01 RX ADMIN — THIAMINE HYDROCHLORIDE 100 MG: 100 INJECTION, SOLUTION INTRAMUSCULAR; INTRAVENOUS at 08:54

## 2020-01-01 RX ADMIN — METOPROLOL TARTRATE 50 MG: 25 TABLET ORAL at 21:12

## 2020-01-01 RX ADMIN — IPRATROPIUM BROMIDE AND ALBUTEROL SULFATE 1 AMPULE: .5; 3 SOLUTION RESPIRATORY (INHALATION) at 06:48

## 2020-01-01 RX ADMIN — PAROXETINE HYDROCHLORIDE 20 MG: 20 TABLET, FILM COATED ORAL at 09:58

## 2020-01-01 RX ADMIN — SODIUM CHLORIDE 80 MG: 9 INJECTION, SOLUTION INTRAVENOUS at 03:34

## 2020-01-01 RX ADMIN — METOPROLOL TARTRATE 50 MG: 25 TABLET ORAL at 21:02

## 2020-01-01 RX ADMIN — COLLAGENASE SANTYL: 250 OINTMENT TOPICAL at 10:01

## 2020-01-01 RX ADMIN — THIAMINE HYDROCHLORIDE 100 MG: 100 INJECTION, SOLUTION INTRAMUSCULAR; INTRAVENOUS at 08:06

## 2020-01-01 RX ADMIN — FOLIC ACID 1 MG: 1 TABLET ORAL at 08:28

## 2020-01-01 RX ADMIN — AMLODIPINE BESYLATE 5 MG: 5 TABLET ORAL at 09:47

## 2020-01-01 RX ADMIN — COLLAGENASE SANTYL: 250 OINTMENT TOPICAL at 10:50

## 2020-01-01 RX ADMIN — PIPERACILLIN AND TAZOBACTAM 3.38 G: 3; .375 INJECTION, POWDER, LYOPHILIZED, FOR SOLUTION INTRAVENOUS at 17:56

## 2020-01-01 RX ADMIN — POTASSIUM CHLORIDE AND SODIUM CHLORIDE 75 ML/HR: 900; 150 INJECTION, SOLUTION INTRAVENOUS at 19:06

## 2020-01-01 RX ADMIN — AMLODIPINE BESYLATE 5 MG: 5 TABLET ORAL at 09:58

## 2020-01-01 RX ADMIN — IPRATROPIUM BROMIDE AND ALBUTEROL SULFATE 1 AMPULE: .5; 3 SOLUTION RESPIRATORY (INHALATION) at 09:04

## 2020-01-01 RX ADMIN — COLLAGENASE SANTYL: 250 OINTMENT TOPICAL at 09:45

## 2020-01-01 RX ADMIN — COLLAGENASE SANTYL: 250 OINTMENT TOPICAL at 21:07

## 2020-01-01 RX ADMIN — FOLIC ACID 1 MG: 1 TABLET ORAL at 08:26

## 2020-01-01 RX ADMIN — METOPROLOL TARTRATE 50 MG: 25 TABLET ORAL at 04:03

## 2020-01-01 RX ADMIN — IPRATROPIUM BROMIDE AND ALBUTEROL SULFATE 1 AMPULE: .5; 3 SOLUTION RESPIRATORY (INHALATION) at 17:55

## 2020-01-01 RX ADMIN — SODIUM CHLORIDE 25 ML: 900 INJECTION INTRAVENOUS at 13:34

## 2020-01-01 RX ADMIN — PIPERACILLIN AND TAZOBACTAM 3.38 G: 3; .375 INJECTION, POWDER, LYOPHILIZED, FOR SOLUTION INTRAVENOUS at 23:49

## 2020-01-01 RX ADMIN — PANTOPRAZOLE SODIUM 40 MG: 40 TABLET, DELAYED RELEASE ORAL at 06:23

## 2020-01-01 RX ADMIN — SODIUM CHLORIDE 20 ML: 9 INJECTION, SOLUTION INTRAVENOUS at 18:21

## 2020-01-01 RX ADMIN — OCTREOTIDE ACETATE 50 MCG/HR: 500 INJECTION, SOLUTION INTRAVENOUS; SUBCUTANEOUS at 16:29

## 2020-01-01 RX ADMIN — SODIUM CHLORIDE 1000 ML: 9 INJECTION, SOLUTION INTRAVENOUS at 20:47

## 2020-01-01 RX ADMIN — POTASSIUM CHLORIDE AND SODIUM CHLORIDE: 900; 150 INJECTION, SOLUTION INTRAVENOUS at 07:27

## 2020-01-01 RX ADMIN — AMLODIPINE BESYLATE 5 MG: 5 TABLET ORAL at 10:19

## 2020-01-01 RX ADMIN — METOPROLOL TARTRATE 50 MG: 25 TABLET ORAL at 01:22

## 2020-01-01 RX ADMIN — LORAZEPAM 1 MG: 2 INJECTION INTRAMUSCULAR; INTRAVENOUS at 20:04

## 2020-01-01 RX ADMIN — SPIRONOLACTONE 50 MG: 25 TABLET ORAL at 09:58

## 2020-01-01 RX ADMIN — IPRATROPIUM BROMIDE AND ALBUTEROL SULFATE 1 AMPULE: .5; 3 SOLUTION RESPIRATORY (INHALATION) at 13:26

## 2020-01-01 RX ADMIN — FOLIC ACID 1 MG: 1 TABLET ORAL at 09:47

## 2020-01-01 RX ADMIN — METOPROLOL TARTRATE 50 MG: 25 TABLET ORAL at 09:58

## 2020-01-01 RX ADMIN — MULTIPLE VITAMINS W/ MINERALS TAB 1 TABLET: TAB at 08:28

## 2020-01-01 RX ADMIN — POTASSIUM CHLORIDE AND SODIUM CHLORIDE: 900; 150 INJECTION, SOLUTION INTRAVENOUS at 08:08

## 2020-01-01 RX ADMIN — MUPIROCIN: 20 OINTMENT TOPICAL at 21:34

## 2020-01-01 RX ADMIN — SODIUM CHLORIDE: 9 INJECTION, SOLUTION INTRAVENOUS at 17:17

## 2020-01-01 RX ADMIN — FOLIC ACID 1 MG: 1 TABLET ORAL at 09:46

## 2020-01-01 RX ADMIN — IPRATROPIUM BROMIDE AND ALBUTEROL SULFATE 1 AMPULE: .5; 3 SOLUTION RESPIRATORY (INHALATION) at 12:32

## 2020-01-01 RX ADMIN — AMLODIPINE BESYLATE 5 MG: 5 TABLET ORAL at 09:46

## 2020-01-01 RX ADMIN — MULTIPLE VITAMINS W/ MINERALS TAB 1 TABLET: TAB at 08:06

## 2020-01-01 RX ADMIN — PIPERACILLIN AND TAZOBACTAM 3.38 G: 3; .375 INJECTION, POWDER, LYOPHILIZED, FOR SOLUTION INTRAVENOUS at 22:27

## 2020-01-01 RX ADMIN — SODIUM CHLORIDE 25 ML: 900 INJECTION INTRAVENOUS at 12:17

## 2020-01-01 RX ADMIN — PIPERACILLIN AND TAZOBACTAM 3.38 G: 3; .375 INJECTION, POWDER, LYOPHILIZED, FOR SOLUTION INTRAVENOUS at 09:05

## 2020-01-01 RX ADMIN — PIPERACILLIN AND TAZOBACTAM 3.38 G: 3; .375 INJECTION, POWDER, LYOPHILIZED, FOR SOLUTION INTRAVENOUS at 15:05

## 2020-01-01 RX ADMIN — SODIUM CHLORIDE 8 MG/HR: 9 INJECTION, SOLUTION INTRAVENOUS at 15:21

## 2020-01-01 RX ADMIN — IPRATROPIUM BROMIDE AND ALBUTEROL SULFATE 1 AMPULE: .5; 3 SOLUTION RESPIRATORY (INHALATION) at 09:22

## 2020-01-01 RX ADMIN — MUPIROCIN: 20 OINTMENT TOPICAL at 10:36

## 2020-01-01 RX ADMIN — PANTOPRAZOLE SODIUM 40 MG: 40 TABLET, DELAYED RELEASE ORAL at 06:08

## 2020-01-01 RX ADMIN — PAROXETINE HYDROCHLORIDE 20 MG: 20 TABLET, FILM COATED ORAL at 08:55

## 2020-01-01 RX ADMIN — THIAMINE HYDROCHLORIDE 100 MG: 100 INJECTION, SOLUTION INTRAMUSCULAR; INTRAVENOUS at 08:29

## 2020-01-01 RX ADMIN — METOPROLOL TARTRATE 50 MG: 25 TABLET ORAL at 09:47

## 2020-01-01 RX ADMIN — METOPROLOL TARTRATE 50 MG: 25 TABLET ORAL at 10:18

## 2020-01-01 RX ADMIN — SODIUM CHLORIDE 8 MG/HR: 9 INJECTION, SOLUTION INTRAVENOUS at 03:35

## 2020-01-01 RX ADMIN — DOXYCYCLINE HYCLATE 100 MG: 100 CAPSULE ORAL at 00:27

## 2020-01-01 RX ADMIN — PIPERACILLIN AND TAZOBACTAM 3.38 G: 3; .375 INJECTION, POWDER, LYOPHILIZED, FOR SOLUTION INTRAVENOUS at 01:07

## 2020-01-01 RX ADMIN — KETAMINE HYDROCHLORIDE 50 MG: 50 INJECTION INTRAMUSCULAR; INTRAVENOUS at 17:19

## 2020-01-01 RX ADMIN — IPRATROPIUM BROMIDE AND ALBUTEROL SULFATE 1 AMPULE: .5; 3 SOLUTION RESPIRATORY (INHALATION) at 05:00

## 2020-01-01 RX ADMIN — PIPERACILLIN AND TAZOBACTAM 3.38 G: 3; .375 INJECTION, POWDER, LYOPHILIZED, FOR SOLUTION INTRAVENOUS at 07:26

## 2020-01-01 RX ADMIN — MULTIPLE VITAMINS W/ MINERALS TAB 1 TABLET: TAB at 09:46

## 2020-01-01 RX ADMIN — PAROXETINE HYDROCHLORIDE 20 MG: 20 TABLET, FILM COATED ORAL at 10:18

## 2020-01-01 RX ADMIN — AMLODIPINE BESYLATE 5 MG: 5 TABLET ORAL at 08:55

## 2020-01-01 RX ADMIN — Medication 10 ML: at 08:07

## 2020-01-01 RX ADMIN — COLLAGENASE SANTYL: 250 OINTMENT TOPICAL at 21:34

## 2020-01-01 RX ADMIN — COLLAGENASE SANTYL: 250 OINTMENT TOPICAL at 08:07

## 2020-01-01 RX ADMIN — IPRATROPIUM BROMIDE AND ALBUTEROL SULFATE 1 AMPULE: .5; 3 SOLUTION RESPIRATORY (INHALATION) at 18:40

## 2020-01-01 RX ADMIN — Medication 10 ML: at 15:04

## 2020-01-01 RX ADMIN — Medication 10 ML: at 08:29

## 2020-01-01 RX ADMIN — IPRATROPIUM BROMIDE AND ALBUTEROL SULFATE 1 AMPULE: .5; 3 SOLUTION RESPIRATORY (INHALATION) at 09:17

## 2020-01-01 RX ADMIN — METOPROLOL TARTRATE 50 MG: 25 TABLET ORAL at 09:46

## 2020-01-01 RX ADMIN — IPRATROPIUM BROMIDE AND ALBUTEROL SULFATE 1 AMPULE: .5; 3 SOLUTION RESPIRATORY (INHALATION) at 09:02

## 2020-01-01 RX ADMIN — FOLIC ACID 1 MG: 1 TABLET ORAL at 08:55

## 2020-01-01 RX ADMIN — AMLODIPINE BESYLATE 5 MG: 5 TABLET ORAL at 08:06

## 2020-01-01 RX ADMIN — MUPIROCIN: 20 OINTMENT TOPICAL at 01:22

## 2020-01-01 RX ADMIN — COLLAGENASE SANTYL: 250 OINTMENT TOPICAL at 10:36

## 2020-01-01 RX ADMIN — OCTREOTIDE ACETATE 50 MCG/HR: 500 INJECTION, SOLUTION INTRAVENOUS; SUBCUTANEOUS at 10:41

## 2020-01-01 RX ADMIN — METOPROLOL TARTRATE 50 MG: 25 TABLET ORAL at 20:07

## 2020-01-01 RX ADMIN — IPRATROPIUM BROMIDE AND ALBUTEROL SULFATE 1 AMPULE: .5; 3 SOLUTION RESPIRATORY (INHALATION) at 05:22

## 2020-01-01 RX ADMIN — COLLAGENASE SANTYL: 250 OINTMENT TOPICAL at 21:42

## 2020-01-01 RX ADMIN — KETAMINE HYDROCHLORIDE 50 MG: 50 INJECTION INTRAMUSCULAR; INTRAVENOUS at 17:23

## 2020-01-01 RX ADMIN — SPIRONOLACTONE 50 MG: 25 TABLET ORAL at 10:36

## 2020-01-01 RX ADMIN — POTASSIUM CHLORIDE AND SODIUM CHLORIDE: 900; 150 INJECTION, SOLUTION INTRAVENOUS at 21:06

## 2020-01-01 RX ADMIN — LORAZEPAM 3 MG: 1 TABLET ORAL at 00:23

## 2020-01-01 RX ADMIN — IPRATROPIUM BROMIDE AND ALBUTEROL SULFATE 1 AMPULE: .5; 3 SOLUTION RESPIRATORY (INHALATION) at 18:07

## 2020-01-01 RX ADMIN — PANTOPRAZOLE SODIUM 40 MG: 40 TABLET, DELAYED RELEASE ORAL at 06:28

## 2020-01-01 RX ADMIN — IPRATROPIUM BROMIDE AND ALBUTEROL SULFATE 1 AMPULE: .5; 3 SOLUTION RESPIRATORY (INHALATION) at 15:20

## 2020-01-01 RX ADMIN — POTASSIUM CHLORIDE: 2 INJECTION, SOLUTION, CONCENTRATE INTRAVENOUS at 07:15

## 2020-01-01 RX ADMIN — METOPROLOL TARTRATE 50 MG: 25 TABLET ORAL at 08:54

## 2020-01-01 RX ADMIN — AMLODIPINE BESYLATE 5 MG: 5 TABLET ORAL at 08:26

## 2020-01-01 RX ADMIN — IPRATROPIUM BROMIDE AND ALBUTEROL SULFATE 1 AMPULE: .5; 3 SOLUTION RESPIRATORY (INHALATION) at 14:45

## 2020-01-01 ASSESSMENT — PAIN SCALES - GENERAL
PAINLEVEL_OUTOF10: 0

## 2020-01-01 ASSESSMENT — ENCOUNTER SYMPTOMS
VOMITING: 1
SHORTNESS OF BREATH: 1
COUGH: 0
SHORTNESS OF BREATH: 0
WHEEZING: 0
CONSTIPATION: 0
ABDOMINAL PAIN: 0
NAUSEA: 1
VOMITING: 0
COUGH: 0
SORE THROAT: 0
NAUSEA: 0
BLOOD IN STOOL: 0
RHINORRHEA: 0
DIARRHEA: 0
BLOOD IN STOOL: 0

## 2020-01-01 ASSESSMENT — COPD QUESTIONNAIRES: CAT_SEVERITY: SEVERE

## 2020-01-24 NOTE — PATIENT INSTRUCTIONS
Patient Education        Multiple Sclerosis (MS): Care Instructions  Your Care Instructions  Multiple sclerosis, also called MS, is a disease that can affect the brain, spinal cord, and nerves to the eyes. MS can cause problems with muscle control and strength, vision, balance, feeling, and thinking. Whatever your symptoms are, taking medicine correctly and following your doctor's advice for home care can help you maintain your quality of life. Follow-up care is a key part of your treatment and safety. Be sure to make and go to all appointments, and call your doctor if you are having problems. It's also a good idea to know your test results and keep a list of the medicines you take. How can you care for yourself at home? General care  · Take your medicines exactly as prescribed. Call your doctor if you think you are having a problem with your medicine. · Use a cane, walker, or scooter if your doctor suggests it. · Keep doing your normal activities as much as you can. · If you have problems urinating, press or tap your bladder area to help start urine flow. If you have trouble controlling your urine, plan your fluid intake and activities so that a toilet will be available when you need it. · Spend time with family and friends. Join a support group for people with MS if you want extra help. · Depression is common with this condition. Tell your doctor if you have trouble sleeping, are eating too much or are not hungry, or feel sad or tearful all the time. Depression can be treated with medicine and counseling. Diet and exercise  · Eat a balanced diet. · If you have problems swallowing, change how and what you eat:  ? Try thick drinks, such as milk shakes. They are easier to swallow than other fluids. ? Do not eat foods that crumble easily. These can cause choking. ? Use a  to prepare food. Soft foods need less chewing.   ? Eat small meals often so that you do not get tired from eating larger meals.  · Get exercise on most days. Work with your doctor to set up a program of walking, swimming, or other exercise that you are able to do. A physical therapist can teach you exercises if you cannot walk but can move your limbs and trunk. Or you can do exercises to help with coordination and balance. You can help improve muscle stiffness by doing exercises while lying in certain positions. When should you call for help? Call your doctor now or seek immediate medical care if:    · You have a change in symptoms.     · You fall or have another injury.     · You have symptoms of a urinary infection. For example:  ? You have blood or pus in your urine. ? You have pain in your back just below your rib cage. This is called flank pain. ? You have a fever, chills, or body aches. ? It hurts to urinate. ? You have groin or belly pain.    Watch closely for changes in your health, and be sure to contact your doctor if:    · You want more information about MS or medicines.     · You have questions about alternative treatments. Do not use any other treatments without talking to your doctor first.   Where can you learn more? Go to [email or web address] and sign in to your [employer] account. Enter D937 in the Purplu box to learn more about \"Multiple Sclerosis (MS): Care Instructions. \"     If you do not have an account, please click on the \"Sign Up Now\" link. Current as of: March 28, 2019  Content Version: 12.3  © 1854-9842 [employer]wise, [employer]. Care instructions adapted under license by [facility] ([facility]). If you have questions about a medical condition or this instruction, always ask your healthcare professional. [place] [address] any warranty or liability for your use of this information.

## 2020-01-31 NOTE — TELEPHONE ENCOUNTER
The patient's results came back with apparent EBONY, hyponatremia, lactic acidosis and anemia. The patient was called multiple times January 25 and did not answer the calls. A message was left in the afternoon January 25 advising the patient to seek further medical care and call for further detail and with any questions. There has been no follow-up or further contact with the patient and he was noted to cancel an appointment he had this week. He was again called January 31 and did not answer. His mother who is listed as his emergency contact and was then called and she did not answer. A message was left for her to call the office for further instruction regarding up on her son's blood work. Results for Miladis Cervantes (MRN [id number]) as of 1/31/2020 15:52   Ref.  Range 1/24/2020 18:15   Sodium Latest Ref Range: 132 - 146 mmol/L 121 (L)   Potassium Latest Ref Range: 3.5 - 5.0 mmol/L 4.4   Chloride Latest Ref Range: 98 - 107 mmol/L 82 (L)   CO2 Latest Ref Range: 22 - 29 mmol/L 17 (L)   BUN Latest Ref Range: 6 - 20 mg/dL 41 (H)   Creatinine Latest Ref Range: 0.7 - 1.2 mg/dL 1.9 (H)   Anion Gap Latest Ref Range: 7 - 16 mmol/L 22 (H)   GFR Non- Latest Ref Range: >=60 mL/min/1.73 39   GFR  Unknown 47   Lactic Acid Latest Ref Range: 0.5 - 2.2 mmol/L 2.6 (H)   Glucose Latest Ref Range: 74 - 99 mg/dL 97   Calcium Latest Ref Range: 8.6 - 10.2 mg/dL 8.6   Total Protein Latest Ref Range: 6.4 - 8.3 g/dL 7.9   Albumin Latest Ref Range: 3.5 - 5.2 g/dL 3.4 (L)   Alk Phos Latest Ref Range: 40 - 129 U/L 244 (H)   ALT Latest Ref Range: 0 - 40 U/L 60 (H)   AST Latest Ref Range: 0 - 39 U/L 77 (H)   Bilirubin Latest Ref Range: 0.0 - 1.2 mg/dL 2.6 (H)   WBC Latest Ref Range: 4.5 - 11.5 E9/L 3.2 (L)   RBC Latest Ref Range: 3.80 - 5.80 E12/L 2.39 (L)   Hemoglobin Quant Latest Ref Range: 12.5 - 16.5 g/dL 8.3 (L)   Hematocrit Latest Ref Range: 37.0 - 54.0 % 25.3 (L)   MCV Latest Ref Range: 80.0 - 99.9 fL

## 2020-02-04 PROBLEM — K92.2 GI (GASTROINTESTINAL HEMORRHAGE): Status: ACTIVE | Noted: 2020-01-01

## 2020-02-04 NOTE — ED NOTES
Pt unable to provide urine sample at this time, has been reminded multiple times.       Yesi Abdi RN  02/03/20 [id number]

## 2020-02-04 NOTE — ED PROVIDER NOTES
Segments: normal  T Waves: normal  Q Waves: none    Clinical Impression: Normal sinus rhythm with low QRS voltage. No EKG for comparison. Darci Luis         --------------------------------------------- PAST HISTORY ---------------------------------------------  Past Medical History:  has a past medical history of Asthma, Hypertension, Movement disorder, MS (multiple sclerosis) ([facility] [address] and Psychiatric problem. Past Surgical History:  has a past surgical history that includes knee surgery; ECHO Compl W Dop Color Flow (5/8/2012); ECHO Compl W Dop Color Flow (10/29/2013); and Abdomen surgery (N/A, 6/7/2019). Social History:  reports that he quit smoking about 8 years ago. His smoking use included cigarettes. He has a 3.60 pack-year smoking history. He quit smokeless tobacco use about 8 years ago. He reports current alcohol use of about 6.0 standard drinks of alcohol per week. He reports that he does not use drugs. Family History: family history includes Arthritis in his maternal grandmother; Asthma in his father; Cancer in his maternal aunt, maternal uncle, and paternal grandmother; Depression in his mother; Osteoporosis in his mother. The patients home medications have been reviewed.     Allergies: Cats claw (uncaria tomentosa)    -------------------------------------------------- RESULTS -------------------------------------------------    LABS:  Results for orders placed or performed during the hospital encounter of 02/03/20   Rapid influenza A/B antigens   Result Value Ref Range    Influenza A by PCR Not Detected Not Detected    Influenza B by PCR Not Detected Not Detected   CBC Auto Differential   Result Value Ref Range    WBC 4.1 (L) 4.5 - 11.5 E9/L    RBC 1.80 (L) 3.80 - 5.80 E12/L    Hemoglobin 6.6 (LL) 12.5 - 16.5 g/dL    Hematocrit 20.0 (L) 37.0 - 54.0 %    .1 (H) 80.0 - 99.9 fL    MCH 36.7 (H) 26.0 - 35.0 pg    MCHC 33.0 32.0 - 34.5 %    RDW 16.6 (H) 11.5 - 15.0 fL    Platelets 90 (L) 130 - 450 E9/L    MPV 10.6 7.0 - 12.0 fL    Neutrophils % 96.5 (H) 43.0 - 80.0 %    Lymphocytes % 2.4 (L) 20.0 - 42.0 %    Monocytes % 2.7 2.0 - 12.0 %    Eosinophils % 0.9 0.0 - 6.0 %    Basophils % 0.5 0.0 - 2.0 %    Neutrophils Absolute 3.98 1.80 - 7.30 E9/L    Lymphocytes Absolute 0.00 (L) 1.50 - 4.00 E9/L    Monocytes Absolute 0.12 0.10 - 0.95 E9/L    Eosinophils Absolute 0.04 (L) 0.05 - 0.50 E9/L    Basophils Absolute 0.00 0.00 - 0.20 E9/L    Anisocytosis 2+     Polychromasia 1+     Hypochromia 1+     Poikilocytes 1+     Ovalocytes 1+    Comprehensive Metabolic Panel w/ Reflex to MG   Result Value Ref Range    Sodium 131 (L) 132 - 146 mmol/L    Potassium reflex Magnesium 4.6 3.5 - 5.0 mmol/L    Chloride 91 (L) 98 - 107 mmol/L    CO2 21 (L) 22 - 29 mmol/L    Anion Gap 19 (H) 7 - 16 mmol/L    Glucose 101 (H) 74 - 99 mg/dL    BUN 25 (H) 6 - 20 mg/dL    CREATININE 1.4 (H) 0.7 - 1.2 mg/dL    GFR Non-African American 55 >=60 mL/min/1.73    GFR African American >60     Calcium 8.1 (L) 8.6 - 10.2 mg/dL    Total Protein 7.0 6.4 - 8.3 g/dL    Alb 2.7 (L) 3.5 - 5.2 g/dL    Total Bilirubin 2.5 (H) 0.0 - 1.2 mg/dL    Alkaline Phosphatase 207 (H) 40 - 129 U/L    ALT 25 0 - 40 U/L    AST 72 (H) 0 - 39 U/L   Troponin   Result Value Ref Range    Troponin 0.18 (H) 0.00 - 0.03 ng/mL   Lactic Acid, Plasma   Result Value Ref Range    Lactic Acid 2.8 (H) 0.5 - 2.2 mmol/L   Platelet Confirmation   Result Value Ref Range    Platelet Confirmation CONFIRMED    EKG 12 Lead   Result Value Ref Range    Ventricular Rate 78 BPM    Atrial Rate 78 BPM    P-R Interval 184 ms    QRS Duration 62 ms    Q-T Interval 366 ms    QTc Calculation (Bazett) 417 ms    P Axis 74 degrees    R Axis 17 degrees    T Axis 50 degrees   TYPE AND SCREEN   Result Value Ref Range    ABO/Rh AB POS     Antibody Screen NEG    PREPARE RBC (CROSSMATCH)   Result Value Ref Range    Product Code Blood Bank [id number]     Description Blood Bank Red Blood Cells, case.  They will admit the patient. Time: 2304. Spoke with Dr. Devon Wolfe for Dr Genie Mc (Gastroenterology). Discussed case. They will provide consultation. This patient's ED course included: a personal history and physicial examination, re-evaluation prior to disposition, multiple bedside re-evaluations, IV medications, cardiac monitoring, continuous pulse oximetry and complex medical decision making and emergency management    This patient has remained hemodynamically stable during their ED course. Diagnosis:  1. Gastrointestinal hemorrhage, unspecified gastrointestinal hemorrhage type    2. Anemia, unspecified type    3. Fatigue, unspecified type    4. EBONY (acute kidney injury) ([facility] [address]    5. Elevated troponin        Disposition:  Patient's disposition: Admit to IMCU  Patient's condition is fair.             Escobar Love DO  02/03/20 8119

## 2020-02-04 NOTE — ED NOTES
Bed changed. Small amt of brown soft stool, negative for blood.      Meri Spencer, KISHOR  02/04/20 [id number] [place]

## 2020-02-04 NOTE — ED NOTES
No suspected reaction from blood administration at this time. Pt call light within reach, will continue to monitor.       Jh Mendez RN  02/04/20 [id number]

## 2020-02-04 NOTE — CONSULTS
[facility]  Dr. Ingrid Wolf M.D., Dr. Laly Martínez M.D., JOSÉ SantosO., Dr. Baltazar Reeevs M.D., Dr. Juan Mendez D.O. Patient Name: Inez Londono  MRN: [id number]  : 1975 (41 y.o. male)  Allergies: is allergic to cats claw (uncaria tomentosa). Date of Service: 2020       Reason for Consultation:  Anemia     HISTORY OF PRESENT ILLNESS:    Patient is a 52-year-old  male with a past medical history significant for essential hypertension, asthma, multiple sclerosis, chronic respiratory failure on 5 L nasal cannula oxygen and morbid obesity with a BMI of 66.4. Pt presented to [facility] emergency room overnight with main complaint of fatigue. In the ER patient underwent routine lab work patient found to have a hyponatremia with a sodium of 131 creatinine of 1.4 anion gap of 19 CO2 of 21 troponin of 0.18. CMP was also obtained patient did have an alk phos of 12 7 with AST 72 ALT 25 no INR was obtained. CBC revealed a hemoglobin of 6.6 WBC of 4.1 MCV of 111.1 platelet count of 90. Started on IV Protonix. Patient seen and evaluated in the ER. Patient disease had increasing fatigue over the last several weeks. Patient denies any hematemesis. Patient does admit to intermittent melena. Patient denies any abdominal pain. Patient rates the fatigue is been increasing. Patient does admit to 5-6 liquor drinks per night for approximately last 10 years. Patient states last drink was approximately 1 day prior. Patient denies any tattoos. Patient denies any family history of liver disease. REVIEW OF SYSTEMS:   Constitutional: Denies fever, chills, or unintentional weight loss. HEENT: Denies double or blurry vision, headaches, ear pain or ringing in the ears. No drainage from the ears, nose or throat. Cardiovascular: Denies any chest pain, irregular heartbeats, or palpitations.    Respiratory: Denies shortness of breath, coughing, sputum fluticasone-salmeterol (ADVAIR DISKUS) 250-50 MCG/DOSE AEPB Inhale 1 puff into the lungs 2 times daily 11/15/19   Aakash Sainz, DO   albuterol sulfate HFA (VENTOLIN HFA) 108 (90 Base) MCG/ACT inhaler Inhale 2 puffs into the lungs every 6 hours as needed for Wheezing 19   Aakash Bees, DO   montelukast (SINGULAIR) 10 MG tablet Take 1 tablet by mouth nightly 19   Aakash Bees, DO   omeprazole (PRILOSEC) 20 MG delayed release capsule Take 1 capsule by mouth Daily 19  Aakash Bees, DO   ibuprofen (ADVIL;MOTRIN) 600 MG tablet Take 1 tablet by mouth every 6 hours as needed for Pain 19   Aakash Bees, DO   Dakins (HYSEPT) 0.25 % SOLN APPLY TO AFFECTED(S) AREAS WITH DAMPENED GAUZE DAILY 19   Historical Provider, MD   dimethyl Fumarate (TECFIDERA) 240 MG delayed release capsule Take 1 capsule by mouth 2 times daily 8/7/15   Talia Vega MD   OXYGEN Inhale 5 L into the lungs continuous     Historical Provider, MD   Respiratory Therapy Supplies WINSTON by Does not apply route. 13   Joseph Patricia DO       Allergies: Cats claw (uncaria tomentosa)    Social History:  Social History     Socioeconomic History    Marital status:      Spouse name: Not on file    Number of children: Not on file    Years of education: Not on file    Highest education level: Not on file   Occupational History    Not on file   Social Needs    Financial resource strain: Not on file    Food insecurity:     Worry: Not on file     Inability: Not on file    Transportation needs:     Medical: Not on file     Non-medical: Not on file   Tobacco Use    Smoking status: Former Smoker     Packs/day: 0.30     Years: 12.00     Pack years: 3.60     Types: Cigarettes     Last attempt to quit: 2011     Years since quittin.2    Smokeless tobacco: Former User     Quit date: 2011   Substance and Sexual Activity    Alcohol use:  Yes     Alcohol/week: 6.0 standard drinks     Types: 6 Shots of liquor per week     Comment: per day    Drug use: No    Sexual activity: Yes     Partners: Female   Lifestyle    Physical activity:     Days per week: Not on file     Minutes per session: Not on file    Stress: Not on file   Relationships    Social connections:     Talks on phone: Not on file     Gets together: Not on file     Attends [employer] service: Not on file     Active member of club or organization: Not on file     Attends meetings of clubs or organizations: Not on file     Relationship status: Not on file    Intimate partner violence:     Fear of current or ex partner: Not on file     Emotionally abused: Not on file     Physically abused: Not on file     Forced sexual activity: Not on file   Other Topics Concern    Not on file   Social History Narrative    Not on file       Family History:  Family History   Problem Relation Age of Onset    Depression Mother     Osteoporosis Mother     Asthma Father     Cancer Maternal Aunt     Cancer Maternal Uncle         leukemia    Arthritis Maternal Grandmother     Cancer Paternal Grandmother         lung cancer         PHYSICAL EXAM:  Vital Signs: /64   Pulse 81   Temp 98 °F (36.7 °C)   Resp 20   Ht 5' 11\" (1.803 m)   Wt (!) 475 lb (215.5 kg)   SpO2 96%   BMI 66.25 kg/m²   GENERAL APPEARANCE:  awake, alert, oriented, cooperative, and in no acute distress  EYES:  Lids and lashes normal, PERRLA, EOMI, sclera clear, conjunctiva normal  HENT:  Normocephalic, without obvious abnormality, atramatic, oral pharynx with moist mucus membranes, tonsils without erythema or exudates  NECK:  Supple with no carotid bruits, JVD or thyromegaly. No cervical adenopathy  LUNGS:  Clear to auscultation bilaterally with no wheezes, rales or rhonchi. No increased work of breathing, good air exchange.   CARDIOVASCULAR: Regular rate and rhythm, no murmur  ABDOMEN: Soft nontender patient with large pannus with chronic draining wound in the inferior aspect  MUSCULOSKELETAL:  There is no redness, warmth, or swelling of the joints. Full range of motion noted. Motor strength is 5 out of 5 all extremities bilaterally. Tone is normal.  EXTREMITIES: No edema, 2+ pulses bilaterally (radial and dorsalis pedis)  NEUROLOGIC:  Awake, alert, oriented to name, place and time. Cranial nerves II-XII are grossly intact. Motor is 5 out of 5 bilaterally. SKIN: Normal skin color, texture, and turgor. There is no redness, warmth, or swelling. No bruising or bleeding, no mottling. PSYCH: Affect, behavior and insight are all within normal limits.       DATA:  Results for orders placed or performed during the hospital encounter of 02/03/20   Rapid influenza A/B antigens   Result Value Ref Range    Influenza A by PCR Not Detected Not Detected    Influenza B by PCR Not Detected Not Detected   CBC Auto Differential   Result Value Ref Range    WBC 4.1 (L) 4.5 - 11.5 E9/L    RBC 1.80 (L) 3.80 - 5.80 E12/L    Hemoglobin 6.6 (LL) 12.5 - 16.5 g/dL    Hematocrit 20.0 (L) 37.0 - 54.0 %    .1 (H) 80.0 - 99.9 fL    MCH 36.7 (H) 26.0 - 35.0 pg    MCHC 33.0 32.0 - 34.5 %    RDW 16.6 (H) 11.5 - 15.0 fL    Platelets 90 (L) 830 - 450 E9/L    MPV 10.6 7.0 - 12.0 fL    Neutrophils % 96.5 (H) 43.0 - 80.0 %    Lymphocytes % 2.4 (L) 20.0 - 42.0 %    Monocytes % 2.7 2.0 - 12.0 %    Eosinophils % 0.9 0.0 - 6.0 %    Basophils % 0.5 0.0 - 2.0 %    Neutrophils Absolute 3.98 1.80 - 7.30 E9/L    Lymphocytes Absolute 0.00 (L) 1.50 - 4.00 E9/L    Monocytes Absolute 0.12 0.10 - 0.95 E9/L    Eosinophils Absolute 0.04 (L) 0.05 - 0.50 E9/L    Basophils Absolute 0.00 0.00 - 0.20 E9/L    Anisocytosis 2+     Polychromasia 1+     Hypochromia 1+     Poikilocytes 1+     Ovalocytes 1+    Comprehensive Metabolic Panel w/ Reflex to MG   Result Value Ref Range    Sodium 131 (L) 132 - 146 mmol/L    Potassium reflex Magnesium 4.6 3.5 - 5.0 mmol/L    Chloride 91 (L) 98 - 107 mmol/L    CO2 21 (L) 22 - 29 mmol/L admitting   -Recommend 10% weight loss the next year      2. Macrocytic Anemia- Hgb 12.4 6/6/19  - Vitals signs stable  - Hgb 6.6-->7.5 this morning 1 unit PRBC   - Iron studies of little value after PRBCs   - Type and Cross 2 Units on hold/Elevate HOB/Transfuse for Hgb less than 7/Transfusion per admitting/  - Agree with Protonix will add octreotide at 50 MCG's per hour  - Will require EGD once medically optimized and cleared by cardiology     3. NSTEMI/Elevated Troponin   - Cardiology consulted   - Will require cardiac clearance prior to EGD     4. Chronic Respiratory Failure- on 5L chronically   - per admitting     5. Super morbid obesity  - Recommend 10% weight loss in the next year       Please see orders for further plan of care. Reviewed above with Dr. Chau Montes De Oca and Dr. Oswaldo Mills D.O.   GI fellow  2/4/2020 at 8:00 AM      Pt seen and independently examined. Pertinent notes and lab work reviewed. D/w Dr. Liss Hurtado with physical exam and A&P. Discussed with patient - all questions answered -patient again advised against further alcohol abuse. Patient is agreeable with the plan as delineated, including plan for endoscopy as described. Thank you for the opportunity to see this patient in consultation.       Jersey Matamoros MD  2/4/2020  11:30 AM

## 2020-02-04 NOTE — H&P
drinking history for cirrhosis versus  VELASCO even his significant abdominal adiposity and interval development of cirrhosis. GI was consulted from the emergency department they will provide consultation. Patient will be admitted to the service of Dr. Bowers to the intermediate care unit. PAST MEDICAL Hx:  Past Medical History:   Diagnosis Date    Asthma     Hypertension     Movement disorder     ms    MS (multiple sclerosis) ([facility] [place] [address]     Psychiatric problem     depression        PAST SURGICAL Hx:   Past Surgical History:   Procedure Laterality Date    ABDOMEN SURGERY N/A 6/7/2019    EXCISIONAL DEBRIDEMENT OF ABDOMINAL WOUND performed by Dunia Villatoro MD at [facility] ECHO [address]  5/8/2012         ECHO COMPL W DOP COLOR FLOW  10/29/2013         KNEE SURGERY      lt       FAMILY Hx:  Family History   Problem Relation Age of Onset    Depression Mother     Osteoporosis Mother     Asthma Father     Cancer Maternal Aunt     Cancer Maternal Uncle         leukemia    Arthritis Maternal Grandmother     Cancer Paternal Grandmother         lung cancer       HOME MEDICATIONS:  Prior to Admission medications    Medication Sig Start Date End Date Taking?  Authorizing Provider   PARoxetine (PAXIL) 20 MG tablet Take 1 tablet by mouth daily 11/15/19   [place] Row, DO   lisinopril (PRINIVIL;ZESTRIL) 40 MG tablet Take 1 tablet by mouth daily 11/15/19   [place] Row, DO   metoprolol tartrate (LOPRESSOR) 50 MG tablet Take 1 tablet by mouth 2 times daily 11/15/19   [place] Row, DO   amLODIPine (NORVASC) 5 MG tablet Take 1 tablet by mouth daily 11/15/19   [place] Row, DO   fluticasone-salmeterol (ADVAIR DISKUS) 250-50 MCG/DOSE AEPB Inhale 1 puff into the lungs 2 times daily 11/15/19   [place] Row, DO   albuterol sulfate HFA (VENTOLIN HFA) 108 (90 Base) MCG/ACT inhaler Inhale 2 puffs into the lungs every 6 hours as needed for Wheezing 9/27/19   [place] Row, DO   montelukast (SINGULAIR) 10 file     Attends [employer] service: Not on file     Active member of club or organization: Not on file     Attends meetings of clubs or organizations: Not on file     Relationship status: Not on file    Intimate partner violence:     Fear of current or ex partner: Not on file     Emotionally abused: Not on file     Physically abused: Not on file     Forced sexual activity: Not on file   Other Topics Concern    Not on file   Social History Narrative    Not on file       ROS:  General:   Admits to fatigue and decreased appetite. Denies fever, chills. Denies malaise. Denies night sweats or weight loss    Psychological:   Chronic depression and anxiety. Denies disorientation or hallucinations    ENT:    Denies epistaxis, headaches, vertigo or visual changes    Cardiovascular:   Mitsue fatigue not worsened by exertion. Denies any chest pain, irregular heartbeats, or palpitations. No paroxysmal nocturnal dyspnea. Respiratory:   Denies shortness of breath, coughing, sputum production, hemoptysis, or wheezing. No orthopnea. Gastrointestinal:   Admits to decreased appetite and nausea. Denies denies vomiting or constipation. Admits to loose consistency bowel movements with melena. Admits to intermittent bouts of indigestion without significant abdominal pain. Genito-Urinary:    Denies any urgency, frequency, hematuria. Voiding without difficulty. Musculoskeletal:   Denies joint pain, joint stiffness, joint swelling or muscle pain    Neurology:    Denies any headache or focal neurological deficits. No weakness or paresthesia. Derm:    Denies any rashes, ulcers, or excoriations. Denies bruising. Extremities:   Denies any lower extremity swelling or edema.       PHYSICAL EXAM:  VITALS:  Vitals:    02/03/20 2315   BP: (!) 121/53   Pulse: 83   Resp: 20   Temp: 97.8 °F (36.6 °C)   SpO2:          CONSTITUTIONAL:    Awake, alert, cooperative, no apparent distress, and appears stated age    EYES: PERRL, EOMI, sclera clear, conjunctiva noted to have pallor    ENT:    Normocephalic, atraumatic, sinuses nontender on palpation. External ears without lesions. Oral pharynx with moist mucus membranes. Tonsils without erythema or exudates. NECK:    Supple, symmetrical, trachea midline, no adenopathy, thyroid symmetric, not enlarged and no tenderness, skin normal, no bruits, no JVD    HEMATOLOGIC/LYMPHATICS:    No cervical lymphadenopathy and no supraclavicular lymphadenopathy    LUNGS:    Symmetric. No increased work of breathing, diminished air entry throughout most likely secondary to large body habitus, clear to auscultation bilaterally, no wheezes, rhonchi, or rales,     CARDIOVASCULAR:    Normal apical impulse, regular rate and rhythm, normal S1 and S2, no S3 or S4, and no murmur noted    ABDOMEN:    Significantly morbidly obese abdominal contour with associated pannus, normal bowel sounds, soft, non-distended, non-tender, no masses palpated, no hepatosplenomegaly, no rebound or guarding elicited on palpation     MUSCULOSKELETAL:    There is no redness, warmth, or swelling of the joints. Full range of motion noted. Motor strength is 5 out of 5 all extremities bilaterally. Tone is normal.    NEUROLOGIC:    Awake, alert, oriented to name, place and time. Cranial nerves II-XII are grossly intact. Motor is 5 out of 5 bilaterally. SKIN:    Pallor is appreciated on my exam.  No bruising or bleeding. No redness, warmth, or swelling    EXTREMITIES:    Peripheral pulses present. No edema, cyanosis, or swelling.     LABORATORY DATA:  CBC with Differential:    Lab Results   Component Value Date    WBC 4.1 02/03/2020    RBC 1.80 02/03/2020    HGB 6.6 02/03/2020    HCT 20.0 02/03/2020    PLT 90 02/03/2020    .1 02/03/2020    MCH 36.7 02/03/2020    MCHC 33.0 02/03/2020    RDW 16.6 02/03/2020    SEGSPCT 71 05/08/2012    METASPCT 2.7 03/27/2019    LYMPHOPCT 2.4 02/03/2020    MONOPCT 2.7 02/03/2020 BASOPCT 0.5 02/03/2020    MONOSABS 0.12 02/03/2020    LYMPHSABS 0.00 02/03/2020    EOSABS 0.04 02/03/2020    BASOSABS 0.00 02/03/2020     BMP:    Lab Results   Component Value Date     02/03/2020    K 4.6 02/03/2020    CL 91 02/03/2020    CO2 21 02/03/2020    BUN 25 02/03/2020    LABALBU 2.7 02/03/2020    LABALBU 4.1 05/08/2012    CREATININE 1.4 02/03/2020    CALCIUM 8.1 02/03/2020    GFRAA >60 02/03/2020    LABGLOM 55 02/03/2020    GLUCOSE 101 02/03/2020    GLUCOSE 118 05/14/2012     Troponin:    Lab Results   Component Value Date    TROPONINI 0.18 02/03/2020       ASSESSMENT:  · Acute upper gastrointestinal hemorrhage with associated multiorgan system dysfunction  · NSTEMI secondary to demand ischemia from #1  · Acute kidney injury, multifactoral  · Moderate to heavy daily alcohol abuse with transaminitis, concern for interval development of alcoholic cirrhosis  · Essential hypertension   · Obstructive sleep apnea and obesity hypoventilation  · Super morbid obesity   · multiple sclerosis, untreated    PLAN:  Brendan Parra presented to [address] [place] [address] emergency department February 3 for evaluation of worsening fatigue. Outpatient laboratory work had been performed and was recently called to the patient with multiple abnormalities. Labs were reviewed in the emergency department where he was found to have acute on chronic anemia. Melena was reported and patient has multiple factors that could be contributing to the development of upper gastrointestinal bleeding including heavy daily alcohol use, regular aspirin and NSAID use. He will be transfused 2 units of packed red blood cells in the emergency department and GI was consulted further recommendations. Will be placed on Protonix bolus and infusion and hemoglobins will be monitored closely. He has associated multiorgan system dysfunction as he has evidence of an NSTEMI secondary to demand ischemia from the anemia most likely.   Cardiology consultation will be

## 2020-02-04 NOTE — CONSULTS
02/04/20 0827    PARoxetine (PAXIL) tablet 20 mg, 20 mg, Oral, Daily, Prem Palin, APRN - CNP, 20 mg at 02/04/20 0827    sodium chloride flush 0.9 % injection 10 mL, 10 mL, Intravenous, 2 times per day, Prem Palin, APRN - CNP, 10 mL at 02/04/20 0829    sodium chloride flush 0.9 % injection 10 mL, 10 mL, Intravenous, PRN, Prem Palin, APRN - CNP    acetaminophen (TYLENOL) tablet 650 mg, 650 mg, Oral, Q4H PRN, Prem Palin, APRN - CNP    [facility] injection 4 mg, 4 mg, Intravenous, Q6H PRN, Prem Palin, APRN - CNP    pantoprazole (PROTONIX) 80 mg in sodium chloride 0.9 % 100 mL infusion, 8 mg/hr, Intravenous, Continuous, Prem Palin, APRN - CNP, Last Rate: 10 mL/hr at 02/04/20 1227, 8 mg/hr at 02/04/20 1227    thiamine (B-1) injection 100 mg, 100 mg, Intramuscular, Daily, Prem Palin, APRN - CNP, 100 mg at 31/86/98 1023    folic acid (FOLVITE) tablet 1 mg, 1 mg, Oral, Daily, Prem Palin, APRN - CNP, 1 mg at 02/04/20 1552    therapeutic multivitamin-minerals 1 tablet, 1 tablet, Oral, Daily, Prem Palin, APRN - CNP, 1 tablet at 02/04/20 0828    LORazepam (ATIVAN) tablet 1 mg, 1 mg, Oral, Q1H PRN **OR** LORazepam (ATIVAN) injection 1 mg, 1 mg, Intravenous, Q1H PRN **OR** LORazepam (ATIVAN) tablet 2 mg, 2 mg, Oral, Q1H PRN **OR** LORazepam (ATIVAN) injection 2 mg, 2 mg, Intravenous, Q1H PRN **OR** LORazepam (ATIVAN) tablet 3 mg, 3 mg, Oral, Q1H PRN **OR** LORazepam (ATIVAN) injection 3 mg, 3 mg, Intravenous, Q1H PRN **OR** LORazepam (ATIVAN) tablet 4 mg, 4 mg, Oral, Q1H PRN **OR** LORazepam (ATIVAN) injection 4 mg, 4 mg, Intravenous, Q1H PRN, Prem Palin, APRN - CNP    perflutren lipid microspheres (DEFINITY) injection 1.65 mg, 1.5 mL, Intravenous, ONCE PRN, Prem Palin, APRN - CNP    0.9% NaCl with KCl 20 mEq infusion, , Intravenous, Continuous, Prem Palin, APRN - CNP, Last Rate: 75 mL/hr at 02/04/20 0334    octreotide (SANDOSTATIN) 500 mcg in sodium chloride 0.9 % 100 mL 3.60     Types: Cigarettes     Last attempt to quit: 2011     Years since quittin.2    Smokeless tobacco: Former User     Quit date: 2011   Substance and Sexual Activity    Alcohol use: Yes     Alcohol/week: 6.0 standard drinks     Types: 6 Shots of liquor per week     Comment: per day    Drug use: No    Sexual activity: Yes     Partners: Female   Lifestyle    Physical activity:     Days per week: Not on file     Minutes per session: Not on file    Stress: Not on file   Relationships    Social connections:     Talks on phone: Not on file     Gets together: Not on file     Attends Sabianist service: Not on file     Active member of club or organization: Not on file     Attends meetings of clubs or organizations: Not on file     Relationship status: Not on file    Intimate partner violence:     Fear of current or ex partner: Not on file     Emotionally abused: Not on file     Physically abused: Not on file     Forced sexual activity: Not on file   Other Topics Concern    Not on file   Social History Narrative    Not on file       FAMILY HISTORY:   Family History   Problem Relation Age of Onset    Depression Mother     Osteoporosis Mother     Asthma Father     Cancer Maternal Aunt     Cancer Maternal Uncle         leukemia    Arthritis Maternal Grandmother     Cancer Paternal Grandmother         lung cancer         REVIEW OF SYSTEMS:     · Constitutional: +generalized fatigue and decreased appetite. Denies fevers, chills, night sweats. Denies significant weight loss or weight gain. · HEENT: Denies headaches, nose bleeds, rhinorrhea, sore throat. Denies blurred vision. Denies dysphagia, odynophagia. · Musculoskeletal: Denies falls, pain to BLE with ambulation. Denies muscle weakness. · Neurological: Denies dizziness and lightheadedness, numbness and tingling. Denies focal neurological deficits. · Cardiovascular: Denies chest pain, palpitations, diaphoresis. Denies syncope.  Denies PND, orthopnea, peripheral edema. · Respiratory: Denies shortness of breath at rest or with exertion. Denies cough, hemoptysis. · Gastrointestinal: Denies abdominal pain, nausea/vomiting, diarrhea and constipation, black/bloody, and tarry stools. · Genitourinary: Denies dysuria and hematuria. · Hematologic: Denies excessive bruising or bleeding. · Endocrine: Denies excessive thirst. Denies intolerance to hot and cold. PHYSICAL EXAM:   /63   Pulse 78   Temp 97.8 °F (36.6 °C) (Oral)   Resp 24   Ht 5' 11\" (1.803 m)   Wt (!) 475 lb (215.5 kg)   SpO2 94%   BMI 66.25 kg/m²   CONST:  Well developed, morbidly obese WM who appears stated age. Awake, alert, cooperative, no apparent distress. HEENT:   Head- Normocephalic, atraumatic. Eyes- Conjunctivae pink, anicteric. Throat- Oral mucosa pink and moist.  Neck-  No stridor, trachea midline, no jugular venous distention. CHEST: Chest symmetrical and non-tender to palpation. No accessory muscle use or intercostal retractions. RESPIRATORY: Lung sounds - clear throughout fields. No wheezing, rales or rhonchi. Diminished breath sounds  CARDIOVASCULAR:     No carotid bruit. Heart Inspection- shows no noted pulsations. Heart Palpation- no heaves or thrills; PMI is non-displaced. Heart Ausculation- Regular rate and rhythm, no murmur. No s3, s4 or rub. PV: + lower extremity edema. No varicosities. Pedal pulses palpable, no clubbing or cyanosis. ABDOMEN: Soft, non-tender to light palpation. Bowel sounds present. MS: Good muscle strength and tone. No atrophy or abnormal movements. : Deferred  SKIN: Warm and dry. NEURO / PSYCH: Oriented to person, place and time. Speech clear and appropriate. Follows all commands. Pleasant affect. DATA:    Telemetry: Normal sinus rhythm with HR in the 80s. Diagnostic:  All diagnostic testing thus far this admission reviewed in detail.           Intake/Output Summary (Last 24 hours) at 2/4/2020 1234  Last data filed at 2/4/2020 0320  Gross per 24 hour   Intake 350 ml   Output --   Net 350 ml       Labs:   CBC:   Recent Labs     02/03/20 2000 02/04/20  0615 02/04/20  1141   WBC 4.1* 4.8  --    HGB 6.6* 7.5* 7.8*   HCT 20.0* 22.9* 24.2*   PLT 90* 80*  --      BMP:   Recent Labs     02/03/20 2000 02/04/20 0615   * 134   K 4.6 5.1*   CO2 21* 22   BUN 25* 23*   CREATININE 1.4* 1.1   LABGLOM 55 >60   CALCIUM 8.1* 8.2*     HgA1c:   Lab Results   Component Value Date    LABA1C 4.6 06/06/2019     PT/INR:   Recent Labs     02/04/20  1141   PROTIME 14.8*   INR 1.3     CARDIAC ENZYMES:  Recent Labs     02/03/20 2000 02/04/20  0305 02/04/20  0800   TROPONINI 0.18* 0.11* 0.08*     FASTING LIPID PANEL:  Lab Results   Component Value Date    CHOL 221 06/06/2019    HDL 38 06/06/2019    LDLCALC 160 06/06/2019    TRIG 116 06/06/2019     LIVER PROFILE:  Recent Labs     02/03/20 2000   AST 72*   ALT 25   LABALBU 2.7*         ASSESSMENT:  1. Elevated troponin, likely type II NSTEMI in the setting of severe anemia on admission. Pancytopenic on admission additionally. 2. Alcohol abuse. 3. Morbid obesity. 4. Elevated LFTs. 5. Hyperlipidemia. 6. EBONY, improved. 7. Hypertension. 8. Asthma. 9. Depression. 10. Multiple sclerosis. 11. GERD. PLAN:  1. EKG shows no acute ischemic changes in a patient with no angina or anginal equivalent symptoms. 2. 2D echocardiogram for evaluation of LV/RV function and valvular heart disease. 3. Per Dr. Kavon Diaz, patient is considered low risk for cardiac morbid event during EGD. 4. No plans for aggressive cardiac work up at this time. 5. Repeat EKG in AM.    6. Will continue to follow. The above case was discussed in detail with Dr. Kavon Diaz. Above assessment and plan made in collaboration with Dr. Kavon Diaz. Further recommendations and assessment/plan as per Dr. Kavon Diaz.   Electronically signed by Quincy Mendez PA-C on 2/4/2020 at 12:34 PM       CARDIOLOGY ATTENDING ATTESTATION: I have personally interviewed the patient and obtained key portions of the history and physical necessary for medical decision making. I have also performed my own review of systems and physical exam. All labs and diagnostic testing results have been reviewed personally. My personal assessment and plan as noted below. All orders and medical decision making was done by me. REVIEW OF SYSTEMS:     · Constitutional: +generalized fatigue and decreased appetite. Denies fevers, chills, night sweats. Denies significant weight loss or weight gain. · HEENT: Denies headaches, nose bleeds, rhinorrhea, sore throat. Denies blurred vision. Denies dysphagia, odynophagia. · Musculoskeletal: Denies falls, pain to BLE with ambulation. Denies muscle weakness. · Neurological: Denies dizziness and lightheadedness, numbness and tingling. Denies focal neurological deficits. · Cardiovascular: Denies chest pain, palpitations, diaphoresis. Denies syncope. Denies PND, orthopnea, peripheral edema. · Respiratory: Denies shortness of breath at rest or with exertion. Denies cough, hemoptysis. · Gastrointestinal: Denies abdominal pain, nausea/vomiting, diarrhea and constipation, black/bloody, and tarry stools. · Genitourinary: Denies dysuria and hematuria. · Hematologic: Denies excessive bruising or bleeding. · Endocrine: Denies excessive thirst. Denies intolerance to hot and cold.         PHYSICAL EXAM:   /63   Pulse 86   Temp 98.5 °F (36.9 °C) (Axillary)   Resp 20   Ht 5' 11\" (1.803 m)   Wt (!) 382 lb 14.4 oz (173.7 kg)   SpO2 100%   BMI 53.40 kg/m²   CONST:  Well developed, morbidly obese white male who appears stated age. Awake, alert, cooperative, no apparent distress  HEENT:   Head- Normocephalic, atraumatic   Eyes- Conjunctivae pink, anicteric  Throat- Oral mucosa pink and moist  Neck-  No stridor, trachea midline, no jugular venous distention. CHEST: Chest symmetrical and non-tender to palpation.  No accessory muscle use or intercostal retractions  RESPIRATORY: Lung sounds - clear throughout fields, but diminished  CARDIOVASCULAR:     No carotid bruit  Heart Inspection- shows no noted pulsations  Heart Palpation- no heaves or thrills; PMI is non-displaced   Heart Ausculation- Regular rate and rhythm, no murmur. No s3, s4 or rub   PV: Trace-1+lower extremity edema. No varicosities. Pedal pulses palpable, no clubbing or cyanosis   ABDOMEN: Soft, non-tender to light palpation. Bowel sounds present. MS: Good muscle strength and tone. No atrophy or abnormal movements. : Deferred  SKIN: Warm and dry   NEURO / PSYCH: Oriented to person, place and time. Speech clear and appropriate. Follows all commands. Pleasant affect         ASSESSMENT:  1. Elevated troponin, likely type II NSTEMI in the setting of severe anemia on admission. Pancytopenic on admission additionally. 2. Alcohol abuse. 3. Morbid obesity. 4. Elevated LFTs. 5. Hyperlipidemia. 6. EBONY, improved. 7. Hypertension. 8. Asthma. 9. Depression. 10. Multiple sclerosis. 11. GERD. PLAN:  1. No chest pain, anginal equivalent symptoms. No ischemic EKG changes  2. Repeat EKG in AM  3. 2D echocardiogram   4. Patient is low risk for cardiac morbid event during EGD procedure  5. Management of anemia and rest as per primary and other services  6. Will continue to follow          Bella Renner.  Sai Little, [facility], [address] [facility]

## 2020-02-04 NOTE — PROGRESS NOTES
Internal Medicine Progress Note    Nikki Inks. Scooby Hearn, & [address] Scooby Staley., F.A.C.O.I. Eduin Olguin D.O., F.A.C.O.I. Primary Care Physician: Vicki Amato DO   Admitting Physician:  Doren Gosselin, DO  Admission date and time: 2/3/2020  7:16 PM    Room:  14/14    Patient Name: Juan Russell  MRN: [id number]    Date of Service: 2/4/2020     Subjective:  Immaculata Clementine presented to [facility] emergency department yesterday with the evaluation of profound weakness and deconditioning. The patient is a significant alcoholic and exhibits early signs of withdrawal during my examination today. He was found to be newly anemic with plans for EGD evaluation later this afternoon. He had a large bowel movement during my examination that was described as melanotic. He denies overt abdominal pain or discomfort. No family members were present during my examination. Review of System: HEENT:  [place] ear pain, sore throat, sinus or eye problems  Cardiovascular:   Denies any chest pain, irregular heartbeats, or palpitations. Respiratory:   Denies shortness of breath, coughing, sputum production, hemoptysis, or wheezing. Gastrointestinal:   Denies nausea, vomiting, diarrhea, or constipation. Denies any abdominal pain. Extremities:   Denies any lower extremity swelling or edema. Neurology:    Denies any headache or focal neurological deficits. No weakness or paresthesia. Derm:    Denies any rashes, ulcers, or excoriations. Denies bruising. Genitourinary:    Denies any urgency, frequency, hematuria. Voiding without difficulty. Musculoskeletal:  Denies myalgias, joint complaints or back pain      Physical Exam:  No intake/output data recorded. Blood pressure (!) 119/59, pulse 85, temperature 98.1 °F (36.7 °C), temperature source Oral, resp. rate 20, height 5' 11\" (1.803 m), weight (!) 475 lb (215.5 kg), SpO2 97 %. HEENT:    PERRLA. EOMI. Sclera clear.   Buccal mucosa moist.  Neck:    Supple. Trachea midline. No thyromegaly. No JVD. No bruits. Heart:    Rhythm regular, rate controlled. No murmurs. Lungs:    Symmetrical. Clear to auscultation bilaterally. No wheezes. No rhonchi. No rales. Abdomen:   Super morbidly obese. Abdomen is soft and nontender. Bowel sounds are active. Extremities:    Peripheral pulses present. No peripheral edema. No ulcers. Neurologic:    Alert x 3. No focal deficit. Cranial nerves grossly intact. Skin:    No petechia. No hemorrhage. No wounds. Psych:   Behavior is normal. Mood appears normal. Speech is not rapid or pressured. Musculokeletal:  Spine ROM normal. Muscular strength intact. Gait not assessed.   Genitalia/Breast:  Deferred    Scheduled Meds:   amLODIPine  5 mg Oral Daily    metoprolol tartrate  50 mg Oral BID    PARoxetine  20 mg Oral Daily    sodium chloride flush  10 mL Intravenous 2 times per day    thiamine  100 mg Intramuscular Daily    folic acid  1 mg Oral Daily    therapeutic multivitamin-minerals  1 tablet Oral Daily       Continuous Infusions:   pantoprozole (PROTONIX) infusion 8 mg/hr (02/04/20 0421)    0.9% NaCl with KCl 20 mEq 75 mL/hr at 02/04/20 0334    octreotide (SANDOSTATIN) infusion 50 mcg/hr (02/04/20 1041)       Objective Data:  CBC with Differential:    Lab Results   Component Value Date    WBC 4.8 02/04/2020    RBC 2.16 02/04/2020    HGB 7.5 02/04/2020    HCT 22.9 02/04/2020    PLT 80 02/04/2020    .0 02/04/2020    MCH 34.7 02/04/2020    MCHC 32.8 02/04/2020    RDW 17.8 02/04/2020    SEGSPCT 71 05/08/2012    METASPCT 2.7 03/27/2019    LYMPHOPCT 0.9 02/04/2020    MONOPCT 1.8 02/04/2020    BASOPCT 1.8 02/04/2020    MONOSABS 0.10 02/04/2020    LYMPHSABS 0.05 02/04/2020    EOSABS 0.00 02/04/2020    BASOSABS 0.09 02/04/2020     BMP:    Lab Results   Component Value Date     02/04/2020    K 5.1 02/04/2020    CL 96 02/04/2020    CO2 22 02/04/2020    BUN 23 02/04/2020    STALIN 2.7 higher level of nursing are required. I am readily available for any further decision-making and intervention.        Deejay Wilkinson DO, F.A.C.O.I.  2/4/2020  10:44 AM

## 2020-02-05 NOTE — CARE COORDINATION
2/5/2020 transition of care:   Pt presented to icu- continuous bipap and sitter at pts bedside for agitation. [facility]- Mother- Kaya Mosqueda [phone]. Pt has 2 daughters he has not spoken to in over 5 years and he has two sisters and his mom that he has a relationship with. Pt lives at [facility] residential apartments on the 3rd floor with an elevator. He was diagnosed 7 years ago with MS and has been disabled for several years. He use to work as a  at [employer] and then a [employer]. He has never had home care- according to his mother they had attempted several places however even [facility] did not come to see him because of his insurance denying this service. Pt has a cpap and oxygen at home- unclear of the provider for this DME. Pt has been to outpt rehab years ago for alcohol problems. No other history of  HHC/DME/VEGA. CM/SS to continue to follow. Pts mother will provide a ride at discharge.  [facility] Electronically signed by Devika Bertrand RN-BC on 2/5/2020 at 2:14 PM

## 2020-02-05 NOTE — CONSULTS
Pulmonary/Critical Care Consult Note    CHIEF COMPLAINT: Anemia, shortness of breath, possible aspiration pneumonitis, morbid obesity, history of infected abdominal pannus, alcohol abuse with last intake 2/3/2020    HISTORY OF PRESENT ILLNESS: Patient is a 28-year-old male who came to the emergency room 2 days ago because of fatigue. He was found to be significantly anemic and is known to drink alcohol every day. The patient became agitated last night and was placed on BiPAP. He was also given Ativan and his PCO2 elevated. He was therefore transferred to the intensive care unit for further and close monitoring. The patient is currently receiving octreotide and pantoprazole via IV infusion. He admits to melenic stools over the last several days. Has so far received 2 units of packed red blood cells and may require an additional transfusion. I reviewed the patient's chest x-ray and it does have a small patchy left lower lobe infiltrate which could be on the basis of aspiration. He denies cigarette smoking and says he quit many years ago. Patient was placed on a CIWA protocol but we will need to be extremely judicious about giving him any benzodiazepines considering his alveolar hypoventilation and tentative respiratory status. Has been placed on vancomycin and Zosyn by his attending physician.         ALLERGY:  Cat hair extract and Horse epithelium    FAMILY HISTORY:  Family History   Problem Relation Age of Onset    Depression Mother     Osteoporosis Mother     Asthma Father     Cancer Maternal Aunt     Cancer Maternal Uncle         leukemia    Arthritis Maternal Grandmother     Cancer Paternal Grandmother         lung cancer       SOCIAL HISTORY:  Social History     Socioeconomic History    Marital status:      Spouse name: Not on file    Number of children: Not on file    Years of education: Not on file    Highest education level: Not on file   Occupational History    Not on file Social Needs    Financial resource strain: Not on file    Food insecurity:     Worry: Not on file     Inability: Not on file    Transportation needs:     Medical: Not on file     Non-medical: Not on file   Tobacco Use    Smoking status: Former Smoker     Packs/day: 0.30     Years: 12.00     Pack years: 3.60     Types: Cigarettes     Last attempt to quit: 2011     Years since quittin.2    Smokeless tobacco: Former User     Quit date: 2011   Substance and Sexual Activity    Alcohol use:  Yes     Alcohol/week: 6.0 standard drinks     Types: 6 Shots of liquor per week     Comment: per day    Drug use: No    Sexual activity: Yes     Partners: Female   Lifestyle    Physical activity:     Days per week: Not on file     Minutes per session: Not on file    Stress: Not on file   Relationships    Social connections:     Talks on phone: Not on file     Gets together: Not on file     Attends Holiness service: Not on file     Active member of club or organization: Not on file     Attends meetings of clubs or organizations: Not on file     Relationship status: Not on file    Intimate partner violence:     Fear of current or ex partner: Not on file     Emotionally abused: Not on file     Physically abused: Not on file     Forced sexual activity: Not on file   Other Topics Concern    Not on file   Social History Narrative    Not on file       MEDICAL HISTORY:  Past Medical History:   Diagnosis Date    Asthma     History of blood transfusion     Hypertension     Movement disorder     ms    [facility]     Psychiatric problem     depression        MEDICATIONS:   collagenase   Topical BID    piperacillin-tazobactam  3.375 g Intravenous Q8H    vancomycin  1,750 mg Intravenous Q12H    sodium chloride flush  10 mL Intravenous 2 times per day    sodium chloride  20 mL Intravenous Once    amLODIPine  5 mg Oral Daily    metoprolol tartrate  50 mg Oral BID    PARoxetine  20 mg Oral Date Value Ref Range Status   02/05/2020 101 98 - 107 mmol/L Final   02/04/2020 96 (L) 98 - 107 mmol/L Final   02/03/2020 91 (L) 98 - 107 mmol/L Final     CO2   Date Value Ref Range Status   02/05/2020 25 22 - 29 mmol/L Final   02/04/2020 22 22 - 29 mmol/L Final   02/03/2020 21 (L) 22 - 29 mmol/L Final     BUN   Date Value Ref Range Status   02/05/2020 23 (H) 6 - 20 mg/dL Final   02/04/2020 23 (H) 6 - 20 mg/dL Final   02/03/2020 25 (H) 6 - 20 mg/dL Final     CREATININE   Date Value Ref Range Status   02/05/2020 1.1 0.7 - 1.2 mg/dL Final   02/04/2020 1.1 0.7 - 1.2 mg/dL Final   02/03/2020 1.4 (H) 0.7 - 1.2 mg/dL Final     Glucose   Date Value Ref Range Status   02/05/2020 158 (H) 74 - 99 mg/dL Final   02/04/2020 114 (H) 74 - 99 mg/dL Final   02/03/2020 101 (H) 74 - 99 mg/dL Final   05/14/2012 118 (H) 70 - 110 mg/dL Final   05/13/2012 107 70 - 110 mg/dL Final   05/12/2012 103 70 - 110 mg/dL Final     Calcium   Date Value Ref Range Status   02/05/2020 8.2 (L) 8.6 - 10.2 mg/dL Final   02/04/2020 8.2 (L) 8.6 - 10.2 mg/dL Final   02/03/2020 8.1 (L) 8.6 - 10.2 mg/dL Final     Total Protein   Date Value Ref Range Status   02/05/2020 7.5 6.4 - 8.3 g/dL Final   02/03/2020 7.0 6.4 - 8.3 g/dL Final   01/24/2020 7.9 6.4 - 8.3 g/dL Final     Albumin   Date Value Ref Range Status   05/08/2012 4.1 3.2 - 4.8 g/dL Final   05/07/2012 4.1 3.2 - 4.8 g/dL Final     Alb   Date Value Ref Range Status   02/05/2020 2.9 (L) 3.5 - 5.2 g/dL Final   02/03/2020 2.7 (L) 3.5 - 5.2 g/dL Final   01/24/2020 3.4 (L) 3.5 - 5.2 g/dL Final     Total Bilirubin   Date Value Ref Range Status   02/05/2020 2.4 (H) 0.0 - 1.2 mg/dL Final   02/03/2020 2.5 (H) 0.0 - 1.2 mg/dL Final   01/24/2020 2.6 (H) 0.0 - 1.2 mg/dL Final     Alkaline Phosphatase   Date Value Ref Range Status   02/05/2020 195 (H) 40 - 129 U/L Final   02/03/2020 207 (H) 40 - 129 U/L Final   01/24/2020 244 (H) 40 - 129 U/L Final     AST   Date Value Ref Range Status   02/05/2020 72 (H) 0 - 39 hypoventilation  4. Morbid obesity  5. Obstructive sleep apnea  6. Pneumonia left lower lobe, possibly aspiration  7. Fatty liver by ultrasound of abdomen with gallbladder sludge    PLAN:  1. BiPAP for now  2. Check chest x-ray and ABGs in a.m.  3. Agree with vancomycin and Zosyn currently  4. Transfuse additional unit of PRBCs tonight. Octreotide and pantoprazole drip  5. Endoscopy tomorrow  6. Check clotting studies if not yet done  7. Add aerosolized bronchodilators  8. SCDs since he is at risk for bleeding on heparin/enoxaparin    ATTESTATION:  ICU Staff Physician note of personal involvement in Care  As the attending physician, I certify that I personally reviewed the patients history and personally examined the patient to confirm the physical findings described above,  And that I reviewed the relevant imaging studies and available reports. I also discussed the differential diagnosis and all of the proposed management plans with the patient and individuals accompanying the patient to this visit. They had the opportunity to ask questions about the proposed management plans and to have those questions answered. This patient has a high probability of sudden, clinically significant deterioration, which requires the highest level of physician preparedness to intervene urgently. I managed/supervised life or organ supporting interventions that required frequent physician assessment. I devoted my full attention to the direct care of this patient for the amount of time indicated below. Time I spent with the family or surrogate(s) is included only if the patient was incapable of providing the necessary information or participating in medical decisions - Time devoted to teaching and to any procedures I billed separately is not included.     CRITICAL CARE TIME:  37 minutes    Electronically signed by Frederick Keita MD on 2/5/2020 at 4:41 PM

## 2020-02-05 NOTE — PROGRESS NOTES
Inpatient Cardiology Progress Note     Date of Service: 2/5/2020    Reason for Follow-up: Elevated troponin    HISTORY OF PRESENT ILLNESS:     Patient seen and examined. Now transferred down to ICU for alcohol withdrawal. Hgb still low despite blood transfusions. REVIEW OF SYSTEMS:     Limited due to patient's mental status and in active alcohol withdrawal. On BiPAP      PHYSICAL EXAM:   /79   Pulse 92   Temp 99.7 °F (37.6 °C) (Axillary)   Resp 29   Ht 5' 11\" (1.803 m)   Wt (!) 371 lb 9.6 oz (168.6 kg)   SpO2 98%   BMI 51.83 kg/m²   CONST:  Well developed, morbidly obese white male who appears stated age. Lethargic on BiPAP  HEENT:   Head- Normocephalic, atraumatic   Eyes- Conjunctivae pink, anicteric  Throat- Oral mucosa pink and moist  Neck-  No stridor, trachea midline, no jugular venous distention. CHEST: Chest symmetrical and non-tender to palpation. No accessory muscle use or intercostal retractions  RESPIRATORY: Lung sounds - clear throughout fields   CARDIOVASCULAR:     No carotid bruit  Heart Inspection- shows no noted pulsations  Heart Palpation- no heaves or thrills; PMI is non-displaced   Heart Ausculation- Regular rate and rhythm, no murmur. No s3, s4 or rub   PV: No lower extremity edema. No varicosities. Pedal pulses palpable, no clubbing or cyanosis   ABDOMEN: Soft, non-tender to light palpation. Bowel sounds present. MS: Good muscle strength and tone. No atrophy or abnormal movements. : Deferred  SKIN: Warm and dry no statis dermatitis or ulcers   NEURO / PSYCH: On BiPAP      DATA:    All diagnostic testing thus far this admission reviewed in detail.         Intake/Output Summary (Last 24 hours) at 2/5/2020 1331  Last data filed at 2/5/2020 1232  Gross per 24 hour   Intake 70 ml   Output --   Net 70 ml       Labs:   CBC:   Recent Labs     02/04/20  0615 02/04/20  1141 02/05/20  1144   WBC 4.8  --  3.9*   HGB 7.5* 7.8* 7.0*  7.1*   HCT 22.9* 24.2* 21.3*  22.1*   PLT 80*  --  86*     BMP:   Recent Labs     02/04/20  0615 02/05/20  1144    140   K 5.1* 4.9   CO2 22 25   BUN 23* 23*   CREATININE 1.1 1.1   LABGLOM >60 >60   CALCIUM 8.2* 8.2*     HgA1c:   Lab Results   Component Value Date    LABA1C 4.6 06/06/2019     PT/INR:   Recent Labs     02/04/20  1141 02/05/20  1144   PROTIME 14.8* 15.1*   INR 1.3 1.3     CARDIAC ENZYMES:  Recent Labs     02/04/20  0800 02/04/20  1330 02/05/20  1144   TROPONINI 0.08* 0.07* 0.07*  0.07*     FASTING LIPID PANEL:  Lab Results   Component Value Date    CHOL 221 06/06/2019    HDL 38 06/06/2019    LDLCALC 160 06/06/2019    TRIG 116 06/06/2019     LIVER PROFILE:  Recent Labs     02/03/20  2000 02/05/20  1144   AST 72* 72*   ALT 25 22   LABALBU 2.7* 2.9*         ASSESSMENT:  1. Elevated troponin, likely type II NSTEMI in the setting of severe anemia on admission. Pancytopenic on admission additionally. 2. Alcohol abuse. 3. Morbid obesity. 4. Elevated LFTs. 5. Hyperlipidemia. 6. EBONY, improved. 7. Hypertension. 8. Asthma. 9. Depression. 10. Multiple sclerosis. 11. GERD. PLAN:  1. 2D echocardiogram done this afternoon, will review. 2. No further cardiac work up indicated at this time.   3. Will sign off and see per request. Call with any questions or concerns        Electronically signed by Ivett Coleman DO on 2/5/2020 at 1:31 PM

## 2020-02-05 NOTE — ANESTHESIA PRE PROCEDURE
Topical BID Amber Gifford, DO        piperacillin-tazobactam (ZOSYN) 3.375 g in dextrose 5 % 50 mL IVPB extended infusion (mini-bag)  3.375 g Intravenous Q8H Jamil Ochoa, DO 12.5 mL/hr at 02/05/20 1530 3.375 g at 02/05/20 1530    And    0.9 % sodium chloride infusion  25 mL Intravenous Q8H Jamil Ochoa, DO        vancomycin (VANCOCIN) 1,750 mg in dextrose 5 % 500 mL IVPB  1,750 mg Intravenous Q12H Jamil Ochoa,  mL/hr at 02/05/20 1317 1,750 mg at 02/05/20 1317    sodium chloride flush 0.9 % injection 10 mL  10 mL Intravenous 2 times per day Isiah Miller MD        sodium chloride flush 0.9 % injection 10 mL  10 mL Intravenous PRN Isiah Miller MD        0.9 % sodium chloride bolus  20 mL Intravenous Once Isiah Miller MD        amLODIPine (NORVASC) tablet 5 mg  5 mg Oral Daily Monika Luis, APRN - CNP   5 mg at 02/05/20 1019    metoprolol tartrate (LOPRESSOR) tablet 50 mg  50 mg Oral BID [place] Luis, APRN - CNP   50 mg at 02/05/20 1018    PARoxetine (PAXIL) tablet 20 mg  20 mg Oral Daily Shung[place] Luis, APRN - CNP   20 mg at 02/05/20 1018    sodium chloride flush 0.9 % injection 10 mL  10 mL Intravenous 2 times per day Sh[place] Luis, APRN - CNP   10 mL at 02/04/20 2100    sodium chloride flush 0.9 % injection 10 mL  10 mL Intravenous PRN Monika Luis, APRN - CNP        acetaminophen (TYLENOL) tablet 650 mg  650 mg Oral Q4H PRN Monika Luis, APRN - CNP        ondansetron [facility]) injection 4 mg  4 mg Intravenous Q6H PRN [place] Luis, APRN - CNP        pantoprazole (PROTONIX) 80 mg in sodium chloride 0.9 % 100 mL infusion  8 mg/hr Intravenous Continuous [place] Luis, APRN - CNP 10 mL/hr at 02/05/20 1631 8 mg/hr at 02/05/20 1631    thiamine (B-1) injection 100 mg  100 mg Intramuscular Daily Shungnak Luis, APRN - CNP   100 mg at 50/31/52 7179    folic acid (FOLVITE) tablet 1 mg  1 mg Oral Daily Monika Luis, APRN - CNP   1 mg at 02/05/20 1018    therapeutic multivitamin-minerals 1 tablet 1 tablet Oral Daily Thurl Hoit, APRN - CNP   1 tablet at 02/05/20 1018    LORazepam (ATIVAN) tablet 1 mg  1 mg Oral Q1H PRN Thurl Hoit, APRN - CNP        Or    LORazepam (ATIVAN) injection 1 mg  1 mg Intravenous Q1H PRN Thurl Hoit, APRN - CNP   1 mg at 02/04/20 2004    Or    LORazepam (ATIVAN) tablet 2 mg  2 mg Oral Q1H PRN Thurl Hoit, APRN - CNP        Or    LORazepam (ATIVAN) injection 2 mg  2 mg Intravenous Q1H PRN Thurl Hoit, APRN - CNP        Or    LORazepam (ATIVAN) tablet 3 mg  3 mg Oral Q1H PRN Thurl Hoit, APRN - CNP   3 mg at 02/05/20 0023    Or    LORazepam (ATIVAN) injection 3 mg  3 mg Intravenous Q1H PRN Thurl Hoit, APRN - CNP   3 mg at 02/05/20 0515    Or    LORazepam (ATIVAN) tablet 4 mg  4 mg Oral Q1H PRN Thurl Hoit, APRN - CNP        Or    LORazepam (ATIVAN) injection 4 mg  4 mg Intravenous Q1H PRN Thurl Hoit, APRN - CNP        perflutren lipid microspheres (DEFINITY) injection 1.65 mg  1.5 mL Intravenous ONCE PRN Thurl Hoit, APRN - CNP        0.9% NaCl with KCl 20 mEq infusion   Intravenous Continuous Thurl Hoit, APRN - CNP 75 mL/hr at 02/05/20 1320      octreotide (SANDOSTATIN) 500 mcg in sodium chloride 0.9 % 100 mL infusion  50 mcg/hr Intravenous Continuous Cortez Basket, DO 10 mL/hr at 02/05/20 1320 50 mcg/hr at 02/05/20 1320    ipratropium-albuterol (DUONEB) nebulizer solution 1 ampule  1 ampule Inhalation 4x daily Brad Hardy, DO   1 ampule at 02/05/20 1325       Allergies:     Allergies   Allergen Reactions    Cat Hair Extract     Horse Epithelium        Problem List:    Patient Active Problem List   Diagnosis Code    Edema R60.9    Morbid obesity ([address] E66.01    Excessive sleepiness G47.10    Asthma, moderate persistent, poorly-controlled J45.40    MS (multiple sclerosis) ([facility] G35    Fatigue R53.83    Numbness and tingling R20.0, R20.2    HERBERT on CPAP G47.33, Z99.89    Anxiety F41.9    Abdominal wall cellulitis L03.311    Open wound of abdomen S31.109A    Essential hypertension I10    Hypomagnesemia E83.42    Macrocytic anemia D53.9    GI (gastrointestinal hemorrhage) K92.2    Chronic wound infection of abdomen S31.109A, L08.9       Past Medical History:        Diagnosis Date    Asthma     History of blood transfusion     Hypertension     Movement disorder     ms    MS (multiple sclerosis) ([facility] [address]     Psychiatric problem     depression        Past Surgical History:        Procedure Laterality Date    ABDOMEN SURGERY N/A 2019    EXCISIONAL DEBRIDEMENT OF ABDOMINAL WOUND performed by Makenna Ugalde MD at [address]  2012         ECHO COMPL W DOP COLOR FLOW  10/29/2013         KNEE SURGERY      lt       Social History:    Social History     Tobacco Use    Smoking status: Former Smoker     Packs/day: 0.30     Years: 12.00     Pack years: 3.60     Types: Cigarettes     Last attempt to quit: 2011     Years since quittin.2    Smokeless tobacco: Former User     Quit date: 2011   Substance Use Topics    Alcohol use: Yes     Alcohol/week: 6.0 standard drinks     Types: 6 Shots of liquor per week     Comment: per day                                Counseling given: Not Answered      Vital Signs (Current):   Vitals:    20 1300 20 1400 20 1500 20 1600   BP: 113/69 137/79 122/63 122/63   Pulse: 93 96 92 91   Resp: 21 22 23 (!) 31   Temp:    99.3 °F (37.4 °C)   TempSrc:    Axillary   SpO2: 100% 96% 96% 95%   Weight:       Height:                                                  BP Readings from Last 3 Encounters:   20 122/63   20 (!) 106/48   11/15/19 137/81       NPO Status:                                                                                 BMI:   Wt Readings from Last 3 Encounters:   20 (!) 371 lb 9.6 oz (168.6 kg)   11/15/19 (!) 375 lb (170.1 kg)   19 (!) 380 lb (172.4 kg)     Body mass index is 51.83 kg/m².     CBC:   Lab

## 2020-02-05 NOTE — CONSULTS
BMP  Recent Labs     02/04/20  0615      K 5.1*   CL 96*   CO2 22   BUN 23*   CREATININE 1.1   CALCIUM 8.2*     Liver Function  Recent Labs     02/03/20  2000   BILITOT 2.5*   AST 72*   ALT 25   ALKPHOS 207*   PROT 7.0   LABALBU 2.7*     No results for input(s): LACTATE in the last 72 hours. Recent Labs     02/04/20  1141   INR 1.3       RADIOLOGY    Xr Chest Standard (2 Vw)    Result Date: 2/3/2020  Reading location: 200 Indication: Fatigue Comparison: Prior chest radiograph from 7/18/2013 Technique: Chest PA and lateral radiographs were obtained. Findings: The cardiomediastinal silhouette is stable in size and contours. Bilateral lungs and costophrenic angles are clear. There is no evidence of pneumothorax. No acute cardiopulmonary disease. Xr Chest Portable    Result Date: 2/4/2020  Reading location: 200 Indication: Shortness of breath Comparison: Prior chest radiograph from 2/3/2020 Technique: Portable AP upright chest radiograph was obtained. Findings: The cardiomediastinal silhouette is stable in size and contours. There is mild patchy right basilar airspace disease. Costophrenic angles are clear. There is no evidence of pneumothorax. Mild patchy right basilar airspace disease. Us Abdomen Limited    Result Date: 2/4/2020  Location:200 Exam: US ABDOMEN LIMITED Comparison: None History:  Right upper quadrant pain Technique: Real-time, gray scale, color flow images of the right upper quadrant was obtained. Findings: The liver is increased in echogenicity. No intrahepatic biliary ductal dilation seen. The majority of the pancreas is obscured by bowel gas. The visualized portions of the pancreas are unremarkable. Echogenic sludge in the gallbladder noted. The common bile duct measures 5.0 mm in greatest dimension. Screening examination of the right kidney is within normal limits. 1. Hepatic steatosis and gallbladder sludge.          ASSESSMENT:  41 y.o. male with acute respiratory

## 2020-02-05 NOTE — PROGRESS NOTES
rule out alternative causes   - Pt acute encephalopathy due to DTs  - No signs of ascites on exam. However hard to ascess due to pts body habitus will obtain limited abdominal US to ascites survey once pts respiratory status has improved   - Continue with Rocephin for SBP coverage      2. Macrocytic Anemia- Hgb 12.4 6/6/19  - Vitals signs stable No overt GI bleed overnight   - Hgb 6.6-->7.5-->7.8  this morning 1 unit PRBC for admisson   - Type and Cross 2 Units on hold/Elevate HOB/Transfuse for Hgb less than 7/Transfusion per admitting/  - Agree with Protonix Continue with  octreotide at 50 MCG's per hour  - Will cancel pts EGD today as Hgb is stable and pt is actively going through withdraw and requiring BIPAP        3. Acute Respiratory Failure   - Chest x ray concerning for possible pneumonia per   - Pt currently BiPAP dependent   - Cancel EGD as above        4. ETOH Withdraw/DT  - per admitting     4. NSTEMI/Elevated Troponin   - Seen and evaluated by cardiology and low risk for EGD      Pt was seen, d/w, and examined with Dr. Rosemarie Wetzel and Dr. Madelyn Aden, DO   GI Fellow   2/5/2020  7:46 AM      Pt seen and independently examined. Pertinent notes and lab work reviewed. Sinus rhythm/sinus tachycardia noted on monitor review. D/w Dr. Chang Cons. Agree with physical exam and A&P.     Severo Pickett, MD  2/5/2020  11:36 AM

## 2020-02-05 NOTE — PROGRESS NOTES
Pharmacy Consultation Note  (Antibiotic Dosing and Monitoring)    Initial consult date: 20  Consulting physician: Dr. Bindu Valencia  Drug(s): Vancomycin  Indication: Pneumonia    Ht Readings from Last 1 Encounters:   20 5' 11\" (1.803 m)     Wt Readings from Last 1 Encounters:   20 (!) 382 lb 14.4 oz (173.7 kg)       Age/  Gender IBW DW  Allergy Information   41 y.o.     male 75.3 kg 114.7 kg  Cat hair extract and Horse epithelium                 Date  WBC BUN/CR UOP Drug/Dose Time   Given Level(s)   (Time) Comments     (#1) 4.8 () 23/1.1 () -- Vancomycin 1,750 mg IV Q12H <1300>       (#2)            (#3)            (#4)            (#5)            (#6)            (#7)            Estimated Creatinine Clearance: 139 mL/min (based on SCr of 1.1 mg/dL). UOP over the past 24 hours:     No intake or output data in the 24 hours ending 20 1208    Temp max: Temp (24hrs), Av °F (36.7 °C), Min:97.8 °F (36.6 °C), Max:98.5 °F (36.9 °C)      Antibiotic Regimen:  Antibiotic Dose Date Initiated Date Stopped   Ceftriaxone 1 g Q24H    Doxycycline 100 mg BID    Pip/tazo 3.375 g Q8H       Cultures:  available culture and sensitivity results were reviewed in EPIC  Culture Date Result    Blood cx #1 2/3 NGTD   Blood cx #2 2/3 NGTD     Assessment:  · Consulted by Dr. Binud Valencia to dose/monitor vancomycin  · Goal trough level:  15-20 mcg/mL  · Pt is a 41 yom admitted to the hospital on 2/3 for alcohol withdrawal and acute upper GI bleed. He was initiated on antibiotics for pneumonia.   · Serum creatinine yesterday: 1.1; CrCl >100 mL/min; baseline Scr ~ 0.6    Plan:  · Vancomycin 1,750 mg IV Q12H  · Trough at steady state  · Follow renal function  · Pharmacist will follow and monitor/adjust dosing as necessary      Thank you for the consult,    Jonathan Gaviria PharmD, BCPS 2020 12:08 PM

## 2020-02-05 NOTE — PROGRESS NOTES
Patient is refusing to wear Bipap. This nurse Mir López RN explained to the patient the importance of wearing the Bipap. Patient has still refused to wear his bipap. Patient is now on 10L high flow and continuous pox sating @ 95.

## 2020-02-05 NOTE — PROGRESS NOTES
4.8 02/04/2020    RBC 2.16 02/04/2020    HGB 7.8 02/04/2020    HCT 24.2 02/04/2020    PLT 80 02/04/2020    .0 02/04/2020    MCH 34.7 02/04/2020    MCHC 32.8 02/04/2020    RDW 17.8 02/04/2020    SEGSPCT 71 05/08/2012    METASPCT 2.7 03/27/2019    LYMPHOPCT 0.9 02/04/2020    MONOPCT 1.8 02/04/2020    BASOPCT 1.8 02/04/2020    MONOSABS 0.10 02/04/2020    LYMPHSABS 0.05 02/04/2020    EOSABS 0.00 02/04/2020    BASOSABS 0.09 02/04/2020     CMP:    Lab Results   Component Value Date     02/04/2020    K 5.1 02/04/2020    CL 96 02/04/2020    CO2 22 02/04/2020    BUN 23 02/04/2020    CREATININE 1.1 02/04/2020    GFRAA >60 02/04/2020    LABGLOM >60 02/04/2020    GLUCOSE 114 02/04/2020    GLUCOSE 118 05/14/2012    PROT 7.0 02/03/2020    LABALBU 2.7 02/03/2020    LABALBU 4.1 05/08/2012    CALCIUM 8.2 02/04/2020    BILITOT 2.5 02/03/2020    ALKPHOS 207 02/03/2020    AST 72 02/03/2020    ALT 25 02/03/2020       Wound Documentation:   Wound 02/04/20 Abdomen Right (Active)   Wound Length (cm) 7 cm 2/4/2020  5:35 PM   Wound Width (cm) 3 cm 2/4/2020  5:35 PM   Wound Surface Area (cm^2) 21 cm^2 2/4/2020  5:35 PM   Wound Assessment Bleeding;Red;Slough 2/4/2020  5:35 PM   Number of days: 0         Assessment:   1. Active alcohol withdrawal  2. Acute upper GI bleed likely compatible with variceal bleeding resulting in acute blood loss anemia  3. Non-ST elevated myocardial infarction in the setting of demand ischemia related to anemia  4. Sepsis secondary to community-acquired pneumonia with concern for aspiration pneumonia  5. Acute renal failure that is multifactorial in nature  6. Heavy alcohol abuse with findings to suggest cirrhosis  7. Essential hypertension   8. Super morbid obesity with obstructive sleep apnea and obesity hypoventilatory syndrome  9. History of multiple sclerosis     Plan:   The patient developed active alcohol withdrawal last night particularly following the loss of IV access.   IV access has been obtained and with the employment of CIWA protocol, he is far less agitated. He is lethargic and is retaining CO2. BiPAP therapy has been placed and arterial blood gases will be monitored as needed. Empiric antibiotic therapy will be employed for coverage of aspiration pneumonia as well as the pannicular wound. 2D echocardiogram is pending to rule out any regional wall motion abnormalities in the setting of demand ischemia. He remains on an octreotide and Protonix infusion but EGD cannot be performed unless the patient is more stable. I believe the patient would benefit from closer monitoring in the intensive care unit and this will be arranged. I have discussed the case with Dr. Lindsey Soto. Routine lab work is pending from this morning and further recommendations pending these results. Continue current therapy. See orders for further plan of care. Greater than 30 minutes of critical care time was spent with the patient. This time included chart review, , and discussion with those consultants involved in the patient's care. More than 50% of my  time was spent at the bedside counseling/coordinating care with the patient and/or family with face to face contact. This time was spent reviewing notes and laboratory data as well as instructing and counseling the patient. Time I spent with the family or surrogate(s) is included only if the patient was incapable of providing the necessary information or participating in medical decisions. I also discussed the differential diagnosis and all of the proposed management plans with the patient and individuals accompanying the patient. Júnior Neal requires this high level of physician care and nursing on the IMC/Telemetry unit due the complexity of decision management and chance of rapid decline or death. Continued cardiac monitoring and higher level of nursing are required. I am readily available for any further decision-making and intervention.        Olivia Dill

## 2020-02-06 NOTE — PROGRESS NOTES
negative for alternative cause of cirrhosis   - No signs of ascites on exam. However hard to ascess due to pts body habitus will obtain limited abdominal US to ascites survey once pts respiratory status has improved   - Continue with Rocephin for SBP coverage       2. Macrocytic Anemia- Hgb 12.4 6/6/19  - Vitals signs stable No overt GI bleed overnight   - Hgb 6.6-->7.5-->7.8-->7.0-->7.5-->7.6  this morning 3 units PRBC for admisson   - Type and Cross 2 Units on hold/Elevate HOB/Transfuse for Hgb less than 7/Transfusion per admitting/  - Goal Hgb 8-9 with concern for cirrhosis and underlying portal hypertension  - Agree with Protonix Continue with octreotide at 50 MCG's per hour  - Plan on EGD this afternoon to evaluate     3. Acute Respiratory Failure-on 5L Chronically at home   - Chest x ray concerning for possible pneumonia   - Abx per admitting         4. ETOH Withdraw/DT- improving   - per admitting      4. NSTEMI/Elevated Troponin   - Seen and evaluated by cardiology and low risk for EGD        Pt was seen, d/w, and examined with Dr. Bel Rivas and Dr. Uziel Cruz DO   GI Fellow   2/6/2020  8:10 AM    Pt seen and independently examined. Pertinent notes and lab work reviewed. Monitor reviewed  - SR noted. D/w Dr. Reyes Oram. Agree with physical exam and A&P. Discussed with patient - all questions answered - agreeable with the plan as delineated, including plan for EGD if respiratory status permits.       Olesya Hummel MD  2/6/2020  10:54 AM

## 2020-02-06 NOTE — PROGRESS NOTES
tonight @ 0030, hold dose if trough is >20 mcg/mL  · Follow renal function  · Pharmacist will follow and monitor/adjust dosing as necessary      Thank you for the consult,    Melany Aguirre, PharmD, BCPS 2/6/2020 12:11 PM   [phone]

## 2020-02-06 NOTE — PROGRESS NOTES
Internal Medicine Progress Note    Narciso Abraham. Zane Lares., & [address] Zane Lares., F.A.C.O.I. Juan A Mixon D.O., F.A.C.O.I. Primary Care Physician: Casimiro Mc DO   Admitting Physician:  Jamil Ochoa DO  Admission date and time: 2/3/2020  7:16 PM    Room:  [place] [address]    Patient Name: Florencio Casey  MRN: [id number]    Date of Service: 2/6/2020     Subjective:  Hazel Littlejohn was transferred to the intensive care unit yesterday for closer monitoring in the setting of active alcohol withdrawal.  He is doing much better today and is maintained on BiPAP therapy. We discussed removing BiPAP and attempting nasal cannula oxygen. Hemoglobin remains labile with plans for endoscopic intervention today. No family members were present during my examination. Review of System: HEENT:  [place] ear pain, sore throat, sinus or eye problems. Admits to irritation associated with BiPAP. Cardiovascular:   Denies any chest pain, irregular heartbeats, or palpitations. Respiratory:   Improving shortness of breath. Gastrointestinal:   Denies overt abdominal pain. Admits to some nausea. Admits to bloating. Extremities:   Denies any lower extremity swelling or edema. Neurology:    Denies any headache or focal neurological deficits. Admits to weakness and deconditioning. Derm:    Admits to the pannicular wound. Genitourinary:    Voiding with the use of a Magana catheter  Musculoskeletal:  Denies myalgias, joint complaints or back pain      Physical Exam:  I/O this shift:  In: 25 [I.V.:25]  Out: -   Blood pressure 123/85, pulse 96, temperature 99.4 °F (37.4 °C), temperature source Axillary, resp. rate 14, height 5' 11\" (1.803 m), weight (!) 390 lb (176.9 kg), SpO2 95 %. HEENT:    PERRLA. EOMI. Sclera clear. Buccal mucosa moist.  BiPAP is in place during my examination. Neck:    Supple. Trachea midline. No thyromegaly. No JVD. No bruits.   Heart:    Ongoing tachycardic rate without murmur. Lungs:    Symmetrical.  Diminished bilaterally with some tachypnea and accessory muscle usage identified on examination. Abdomen:   Super morbidly obese with massive pannus. Pannicular wound is identified. Extremities:    Peripheral pulses present. Chronic lower extremity edema with venous stasis  Neurologic:    Alert x 3. Lethargic during my examination but answers questions accordingly. Skin:    No petechia. No hemorrhage. Pannicular wound. Psych:   Obvious signs of alcohol withdrawal.  Musculokeletal:  Spine ROM normal. Muscular strength intact. Gait not assessed.   Genitalia/Breast:  Deferred    Scheduled Meds:   collagenase   Topical BID    piperacillin-tazobactam  3.375 g Intravenous Q8H    vancomycin  1,750 mg Intravenous Q12H    sodium chloride flush  10 mL Intravenous 2 times per day    amLODIPine  5 mg Oral Daily    metoprolol tartrate  50 mg Oral BID    PARoxetine  20 mg Oral Daily    sodium chloride flush  10 mL Intravenous 2 times per day    thiamine  100 mg Intramuscular Daily    folic acid  1 mg Oral Daily    therapeutic multivitamin-minerals  1 tablet Oral Daily    ipratropium-albuterol  1 ampule Inhalation 4x daily       Continuous Infusions:   sodium chloride Stopped (02/06/20 0726)    pantoprozole (PROTONIX) infusion 8 mg/hr (02/06/20 0335)    0.9% NaCl with KCl 20 mEq 75 mL/hr at 02/06/20 0727    octreotide (SANDOSTATIN) infusion 50 mcg/hr (02/06/20 0334)       Objective Data:  CBC with Differential:    Lab Results   Component Value Date    WBC 3.3 02/06/2020    RBC 2.08 02/06/2020    HGB 7.6 02/06/2020    HCT 23.3 02/06/2020    PLT 96 02/06/2020    .0 02/06/2020    MCH 36.5 02/06/2020    MCHC 32.6 02/06/2020    RDW 20.7 02/06/2020    SEGSPCT 71 05/08/2012    METASPCT 2.7 03/27/2019    LYMPHOPCT 4.4 02/06/2020    MONOPCT 6.2 02/06/2020    BASOPCT 2.7 02/06/2020    MONOSABS 0.20 02/06/2020    LYMPHSABS 0.13 02/06/2020    EOSABS 0.23 02/06/2020 BASOSABS 0.09 02/06/2020     CMP:    Lab Results   Component Value Date     02/06/2020    K 4.7 02/06/2020    K 5.1 02/04/2020     02/06/2020    CO2 27 02/06/2020    BUN 21 02/06/2020    CREATININE 1.0 02/06/2020    GFRAA >60 02/06/2020    LABGLOM >60 02/06/2020    GLUCOSE 127 02/06/2020    GLUCOSE 118 05/14/2012    PROT 7.4 02/06/2020    LABALBU 2.9 02/06/2020    LABALBU 4.1 05/08/2012    CALCIUM 8.3 02/06/2020    BILITOT 2.7 02/06/2020    ALKPHOS 187 02/06/2020     02/06/2020    ALT 23 02/06/2020       Wound Documentation:   Wound 02/04/20 Abdomen Right (Active)   Wound Length (cm) 7 cm 2/4/2020  5:35 PM   Wound Width (cm) 3 cm 2/4/2020  5:35 PM   Wound Surface Area (cm^2) 21 cm^2 2/4/2020  5:35 PM   Wound Assessment Bleeding;Red;Slough 2/4/2020  5:35 PM   Number of days: 0         Assessment:   1. Active alcohol withdrawal  2. Acute upper GI bleed likely compatible with variceal bleeding resulting in acute blood loss anemia  3. Non-ST elevated myocardial infarction in the setting of demand ischemia related to anemia  4. Sepsis secondary to community-acquired pneumonia with concern for aspiration pneumonia  5. Acute renal failure that is multifactorial in nature  6. Heavy alcohol abuse with findings to suggest cirrhosis  7. Essential hypertension   8. Super morbid obesity with obstructive sleep apnea and obesity hypoventilatory syndrome  9. History of multiple sclerosis     Plan:   [place] has improved substantially when compared to my examination yesterday. We will attempt to remove the BiPAP therapy today. Plans are for endoscopic evaluation today in the setting of labile hemoglobin with suspected esophageal varices. Appropriate alcohol withdrawal protocol is being undertaken. Antibiotic therapy is being employed for coverage of aspiration pneumonia. Greater than 30 minutes of critical care time was spent with the patient.   This time included chart review, , and discussion with

## 2020-02-06 NOTE — ANESTHESIA POSTPROCEDURE EVALUATION
Department of Anesthesiology  Postprocedure Note    Patient: Malgorzata Kaplan  MRN: [id number]  YOB: 1975  Date of evaluation: 2/6/2020  Time:  5:42 PM     Procedure Summary     Date:  02/06/20 Room / Location:  [address] [place] [address] / [address] [place] [address]    Anesthesia Start:  4057 Anesthesia Stop:  5921    Procedure:  EGD ESOPHAGOGASTRODUODENOSCOPY (N/A ) Diagnosis:  (ANEMIA / GI BLEED)    Surgeon:  Juan Perea MD Responsible Provider:  Ariela Maravilla MD    Anesthesia Type:  MAC ASA Status:  4          Anesthesia Type: MAC    Wilver Phase I:      Wilver Phase II:      Last vitals: Reviewed and per EMR flowsheets.        Anesthesia Post Evaluation    Patient location during evaluation: ICU  Patient participation: complete - patient participated  Level of consciousness: awake and alert  Airway patency: patent  Nausea & Vomiting: no vomiting and no nausea  Complications: no  Cardiovascular status: hemodynamically stable  Respiratory status: acceptable  Hydration status: stable

## 2020-02-06 NOTE — PROGRESS NOTES
Physical Therapy Initial Assessment     Room #:  IC08/IC08-01  Patient Name: Susan Seals  YOB: 1975  MRN: [id number]    Referring Provider:  Inocencio Stevens MD    Date of Service: 2/6/2020    Evaluating Physical Therapist:  Margaret Barron PT     Diagnosis:   GI (gastrointestinal hemorrhage) [K92.2]        Patient Active Problem List   Diagnosis    Edema    Morbid obesity ([facility] [address] [facility]    Excessive sleepiness    Asthma, moderate persistent, poorly-controlled    MS (multiple sclerosis) ([facility] [address] [facility]    Fatigue    Numbness and tingling    HERBERT on CPAP    Anxiety    Abdominal wall cellulitis    Open wound of abdomen    Essential hypertension    Hypomagnesemia    Macrocytic anemia    GI (gastrointestinal hemorrhage)    Chronic wound infection of abdomen        Tentative placement recommendation: Subacute rehab    Equipment recommendation:  To be determined      Prior Level of Function: Patient ambulated independently    Rehab Potential: good  for baseline    Past medical history:       Diagnosis Date    Asthma     History of blood transfusion     Hypertension     Movement disorder     ms    MS (multiple sclerosis) ([facility] [address] [facility]     Psychiatric problem     depression    ;      Procedure Laterality Date    ABDOMEN SURGERY N/A 6/7/2019    EXCISIONAL DEBRIDEMENT OF ABDOMINAL WOUND performed by Dayami Pettit MD at [address]  5/8/2012         ECHO COMPL W DOP COLOR FLOW  10/29/2013         KNEE SURGERY      lt       Precautions: falls, low air loss bed and bipap ,      SUBJECTIVE:    Social history: Patient lives alone  in a apartment 3rd floor with Elevator   Tub shower grab bars    Equipment owned: O2,       [id number] [facility]  Mobility Inpatient   How much difficulty turning over in bed?: A Lot  How much difficulty sitting down on / standing up from a chair with arms?: A Lot  How much difficulty moving from lying on back to sitting on side of bed?: A

## 2020-02-06 NOTE — PROCEDURES
Flora Rod is a 41 y.o. male patient. 1. Gastrointestinal hemorrhage, unspecified gastrointestinal hemorrhage type    2. Anemia, unspecified type    3. Fatigue, unspecified type    4. EBONY (acute kidney injury) ([facility] [address] [facility]    5. Elevated troponin      Past Medical History:   Diagnosis Date    Asthma     History of blood transfusion     Hypertension     Movement disorder     ms    MS (multiple sclerosis) (HCC)     Psychiatric problem     depression      Blood pressure (!) 142/76, pulse 84, temperature 98.4 °F (36.9 °C), temperature source Oral, resp. rate 24, height 5' 11\" (1.803 m), weight (!) 390 lb (176.9 kg), SpO2 93 %. Procedures     Procedure:  Esophagogastroduodenoscopy    Indication:  Acute blood loss anemia, melena     Sedation:  MAC    Estimated Blood Loss: 0cc     Endoscope was advanced easily through mouth to second portion of duodenum      Oropharynx views are limited but grossly normal.    Esophagus:   Mucosa is normal. No varices present   GEJ at 42 cm. No fresh or old blood was seen       Stomach:   Antrum is normal    Gastric body is normal.    Retroflexed views show normal fundus and cardia. No gastric varices present     No fresh or old blood was seen     Duodenum: Bulb is normal.    Second portion of duodenum is normal.      No fresh or old blood was seen    IMPRESSION AND PLAN:     1. Normal upper endoscopy. No esophageal or gastric varices  seen. No source of pts anemia was seen. Will stop pts Octreotide and Protonix gtts. Okay for daily CBC.        Pt was seen and procedure was performed with Dr. Vu Boswell present for the entire procedure      Helene Madrid DO  2/6/2020      I was present for entire duration of procedure; discussed findings with resident and agree with recommendations above    Harmony Benavides MD  Gastroenterology

## 2020-02-06 NOTE — PROGRESS NOTES
Pulmonary/Critical Care Progress Note    We are following patient for anemia/GI bleed, shortness of breath, question aspiration pneumonitis, morbid obesity, infected abdominal wall pannus, alcohol abuse    SUBJECTIVE:  Patient is more comfortable today and is also very compliant with BiPAP. I looked at his chest x-ray today and I do not believe there is really much difference. The technique is such that today's film is much more lordotic and lighter in exposure. He is due to be endoscoped today which will hopefully elucidate the reason for his anemia and GI bleed. He continues to tolerate his Zosyn without difficulty. He is also on octreotide and pantoprazole drips. MEDICATIONS:   collagenase   Topical BID    piperacillin-tazobactam  3.375 g Intravenous Q8H    vancomycin  1,750 mg Intravenous Q12H    sodium chloride flush  10 mL Intravenous 2 times per day    amLODIPine  5 mg Oral Daily    metoprolol tartrate  50 mg Oral BID    PARoxetine  20 mg Oral Daily    sodium chloride flush  10 mL Intravenous 2 times per day    thiamine  100 mg Intramuscular Daily    folic acid  1 mg Oral Daily    therapeutic multivitamin-minerals  1 tablet Oral Daily    ipratropium-albuterol  1 ampule Inhalation 4x daily      sodium chloride 25 mL (02/06/20 1217)    pantoprozole (PROTONIX) infusion 8 mg/hr (02/06/20 0844)    0.9% NaCl with KCl 20 mEq 75 mL/hr (02/06/20 0843)    octreotide (SANDOSTATIN) infusion 50 mcg/hr (02/06/20 0844)     haloperidol lactate, sodium chloride flush, sodium chloride flush, acetaminophen, ondansetron, perflutren lipid microspheres      REVIEW OF SYSTEMS:  Constitutional: Denies fever, weight loss, night sweats, and fatigue  Skin: Denies pigmentation, dark lesions, and rashes   HEENT: Denies hearing loss, tinnitus, ear drainage, epistaxis, sore throat, and hoarseness. Cardiovascular: Denies palpitations, chest pain, and chest pressure.   Respiratory: Denies cough, dyspnea at rest, hemoptysis, apnea, and choking. Gastrointestinal: Denies nausea, vomiting, poor appetite, diarrhea, heartburn or reflux  Genitourinary: Denies dysuria, frequency, urgency or hematuria  Musculoskeletal: Denies myalgias, muscle weakness, and bone pain  Neurological: Denies dizziness, vertigo, headache, and focal weakness  Psychological: Denies anxiety and depression  Endocrine: Denies heat intolerance and cold intolerance  Hematopoietic/Lymphatic: Denies bleeding problems and blood transfusions    OBJECTIVE:  Vitals:    02/06/20 1200   BP: (!) 115/50   Pulse: 84   Resp: (!) 32   Temp:    SpO2: 93%     FiO2 : 40 %  O2 Flow Rate (L/min): 7 L/min  O2 Device: PAP (positive airway pressure)(14/6)    PHYSICAL EXAM:  Constitutional: No fever, chills, diaphoresis  Skin: No skin rash, no skin breakdown at abdominal wall pannus is being treated appropriately  HEENT: Unremarkable  Neck: No JVD, lymphadenopathy, thyromegaly  Cardiovascular: S1, S2 normal.  No S3 murmurs rubs present  Respiratory: Low pitched wheeze anteriorly. Few crackles at the lung bases on inspiration  Gastrointestinal: Soft, markedly obese, abdominal pain is noted to be dressed and protected  Genitourinary: No CVA tenderness  Extremities: No clubbing, cyanosis, or edema  Neurological: Wake, alert, oriented x3.   Psychological: Appropriate, intact mental status    LABS:  WBC   Date Value Ref Range Status   02/06/2020 3.3 (L) 4.5 - 11.5 E9/L Final   02/05/2020 3.9 (L) 4.5 - 11.5 E9/L Final   02/04/2020 4.8 4.5 - 11.5 E9/L Final     Hemoglobin   Date Value Ref Range Status   02/06/2020 7.6 (L) 12.5 - 16.5 g/dL Final   02/05/2020 7.5 (L) 12.5 - 16.5 g/dL Final   02/05/2020 6.7 (L) 12.5 - 16.5 g/dL Final     Hematocrit   Date Value Ref Range Status   02/06/2020 23.3 (L) 37.0 - 54.0 % Final   02/05/2020 23.4 (L) 37.0 - 54.0 % Final   02/05/2020 20.7 (L) 37.0 - 54.0 % Final     MCV   Date Value Ref Range Status   02/06/2020 112.0 (H) 80.0 - 99.9 fL Final 02/05/2020 112.1 (H) 80.0 - 99.9 fL Final   02/04/2020 106.0 (H) 80.0 - 99.9 fL Final     Platelets   Date Value Ref Range Status   02/06/2020 96 (L) 130 - 450 E9/L Final   02/05/2020 86 (L) 130 - 450 E9/L Final   02/04/2020 80 (L) 130 - 450 E9/L Final     Sodium   Date Value Ref Range Status   02/06/2020 140 132 - 146 mmol/L Final   02/05/2020 140 132 - 146 mmol/L Final   02/04/2020 134 132 - 146 mmol/L Final     Potassium   Date Value Ref Range Status   02/06/2020 4.7 3.5 - 5.0 mmol/L Final   02/05/2020 4.9 3.5 - 5.0 mmol/L Final   01/24/2020 4.4 3.5 - 5.0 mmol/L Final     Potassium reflex Magnesium   Date Value Ref Range Status   02/04/2020 5.1 (H) 3.5 - 5.0 mmol/L Final   02/03/2020 4.6 3.5 - 5.0 mmol/L Final   06/05/2019 4.3 3.5 - 5.0 mmol/L Final     Chloride   Date Value Ref Range Status   02/06/2020 102 98 - 107 mmol/L Final   02/05/2020 101 98 - 107 mmol/L Final   02/04/2020 96 (L) 98 - 107 mmol/L Final     CO2   Date Value Ref Range Status   02/06/2020 27 22 - 29 mmol/L Final   02/05/2020 25 22 - 29 mmol/L Final   02/04/2020 22 22 - 29 mmol/L Final     BUN   Date Value Ref Range Status   02/06/2020 21 (H) 6 - 20 mg/dL Final   02/05/2020 23 (H) 6 - 20 mg/dL Final   02/04/2020 23 (H) 6 - 20 mg/dL Final     CREATININE   Date Value Ref Range Status   02/06/2020 1.0 0.7 - 1.2 mg/dL Final   02/05/2020 1.1 0.7 - 1.2 mg/dL Final   02/04/2020 1.1 0.7 - 1.2 mg/dL Final     Glucose   Date Value Ref Range Status   02/06/2020 127 (H) 74 - 99 mg/dL Final   02/05/2020 158 (H) 74 - 99 mg/dL Final   02/04/2020 114 (H) 74 - 99 mg/dL Final   05/14/2012 118 (H) 70 - 110 mg/dL Final   05/13/2012 107 70 - 110 mg/dL Final   05/12/2012 103 70 - 110 mg/dL Final     Calcium   Date Value Ref Range Status   02/06/2020 8.3 (L) 8.6 - 10.2 mg/dL Final   02/05/2020 8.2 (L) 8.6 - 10.2 mg/dL Final   02/04/2020 8.2 (L) 8.6 - 10.2 mg/dL Final     Total Protein   Date Value Ref Range Status   02/06/2020 7.4 6.4 - 8.3 g/dL Final   02/05/2020 Final   06/06/2019 1.4 (L) 1.6 - 2.6 mg/dL Final     Phosphorus   Date Value Ref Range Status   06/06/2019 3.1 2.5 - 4.5 mg/dL Final   05/08/2012 5.0 2.4 - 5.1 mg/dL Final     Recent Labs     02/06/20  0511   PH 7.384   PO2 87.3   PCO2 45.1*   HCO3 26.3*   BE 1.0   O2SAT 96.5       RADIOLOGY:  XR CHEST PORTABLE   Final Result   Slight interval worsening within right basilar lung   infiltrate and/or atelectasis, with probable interval development of   small right pleural effusion since previous exam of 2/4/2020      XR CHEST PORTABLE   Final Result   Mild patchy right basilar airspace disease. US ABDOMEN LIMITED   Final Result   1. Hepatic steatosis and gallbladder sludge. XR CHEST STANDARD (2 VW)   Final Result   No acute cardiopulmonary disease. XR CHEST PORTABLE    (Results Pending)           PROBLEM LIST:  Principal Problem:    GI (gastrointestinal hemorrhage)  Active Problems:    Chronic wound infection of abdomen  Resolved Problems:    * No resolved hospital problems. *      IMPRESSION:  1. Acute respiratory failure  2. 2.  Obstructive sleep apnea  3. 3.  Alcohol abuse  4. Pneumonia lung bases  5. Fatty liver  6. Alveolar hypoventilation  7. Possible GI bleed        PLAN:  1. Upper endoscopy today  2. Aerosol treatments  3. Continue Zosyn  4. Try to wean BiPAP after he has undergone endoscopy  5. Follow electrolytes and hemoglobin/hematocrit carefully    ATTESTATION:  ICU Staff Physician note of personal involvement in Care  As the attending physician, I certify that I personally reviewed the patients history and personally examined the patient to confirm the physical findings described above,  And that I reviewed the relevant imaging studies and available reports. I also discussed the differential diagnosis and all of the proposed management plans with the patient and individuals accompanying the patient to this visit.   They had the opportunity to ask questions about the proposed management plans and to have those questions answered. This patient has a high probability of sudden, clinically significant deterioration, which requires the highest level of physician preparedness to intervene urgently. I managed/supervised life or organ supporting interventions that required frequent physician assessment. I devoted my full attention to the direct care of this patient for the amount of time indicated below. Time I spent with the family or surrogate(s) is included only if the patient was incapable of providing the necessary information or participating in medical decisions - Time devoted to teaching and to any procedures I billed separately is not included.     CRITICAL CARE TIME:  37 minutes    Electronically signed by Bonifacio Pabon MD on 2/6/2020 at 12:50 PM

## 2020-02-06 NOTE — PROGRESS NOTES
Speech Language Pathology      NAME:  Benji Judge  :  1975  DATE: 2020  [facility]    Not able to complete swallow eval due to NPO for scope this afternoon.       GI (gastrointestinal hemorrhage) [K92.2]    Roberto Ascencio MSCCC/SLP  Speech Language Pathologist  [phone]

## 2020-02-07 NOTE — PROGRESS NOTES
reflux  Genitourinary: Denies dysuria, frequency, urgency or hematuria  Musculoskeletal: Denies myalgias, muscle weakness, and bone pain  Neurological: Denies dizziness, vertigo, headache, and focal weakness  Psychological: Denies anxiety and depression  Endocrine: Denies heat intolerance and cold intolerance  Hematopoietic/Lymphatic: Denies bleeding problems and blood transfusions    OBJECTIVE:  Vitals:    02/07/20 1000   BP:    Pulse: 78   Resp: (!) 31   Temp:    SpO2: 92%     FiO2 : 40 %  O2 Flow Rate (L/min): 8 L/min  O2 Device: High flow nasal cannula    PHYSICAL EXAM:  Constitutional: No fever, chills, diaphoresis  Skin: Nominal pannus wound dressed  HEENT: Unremarkable  Neck: Thick neck circumference, no JVD or lymphadenopathy or thyromegaly  Cardiovascular: S1, S2 normal.  No S3 murmurs rubs present  Respiratory: Clear to auscultation bilaterally  Gastrointestinal: Massively obese, abdominal pannus dressed, nontender  Genitourinary: CVA tenderness  Extremities: Clubbing, cyanosis, or edema  Neurological: Awake, alert, oriented x3.   No evidence of focal motor or sensory deficits  Psychological: Appropriate affect    LABS:  WBC   Date Value Ref Range Status   02/07/2020 2.5 (L) 4.5 - 11.5 E9/L Final   02/06/2020 3.3 (L) 4.5 - 11.5 E9/L Final   02/05/2020 3.9 (L) 4.5 - 11.5 E9/L Final     Hemoglobin   Date Value Ref Range Status   02/07/2020 7.5 (L) 12.5 - 16.5 g/dL Final   02/06/2020 7.6 (L) 12.5 - 16.5 g/dL Final   02/05/2020 7.5 (L) 12.5 - 16.5 g/dL Final     Hematocrit   Date Value Ref Range Status   02/07/2020 23.3 (L) 37.0 - 54.0 % Final   02/06/2020 23.3 (L) 37.0 - 54.0 % Final   02/05/2020 23.4 (L) 37.0 - 54.0 % Final     MCV   Date Value Ref Range Status   02/07/2020 113.7 (H) 80.0 - 99.9 fL Final   02/06/2020 112.0 (H) 80.0 - 99.9 fL Final   02/05/2020 112.1 (H) 80.0 - 99.9 fL Final     Platelets   Date Value Ref Range Status   02/07/2020 100 (L) 130 - 450 E9/L Final   02/06/2020 96 (L) 130 - 450 E9/L Final   02/05/2020 86 (L) 130 - 450 E9/L Final     Sodium   Date Value Ref Range Status   02/07/2020 141 132 - 146 mmol/L Final   02/06/2020 140 132 - 146 mmol/L Final   02/05/2020 140 132 - 146 mmol/L Final     Potassium   Date Value Ref Range Status   02/07/2020 5.0 3.5 - 5.0 mmol/L Final   02/06/2020 4.7 3.5 - 5.0 mmol/L Final   02/05/2020 4.9 3.5 - 5.0 mmol/L Final     Potassium reflex Magnesium   Date Value Ref Range Status   02/04/2020 5.1 (H) 3.5 - 5.0 mmol/L Final   02/03/2020 4.6 3.5 - 5.0 mmol/L Final   06/05/2019 4.3 3.5 - 5.0 mmol/L Final     Chloride   Date Value Ref Range Status   02/07/2020 103 98 - 107 mmol/L Final   02/06/2020 102 98 - 107 mmol/L Final   02/05/2020 101 98 - 107 mmol/L Final     CO2   Date Value Ref Range Status   02/07/2020 26 22 - 29 mmol/L Final   02/06/2020 27 22 - 29 mmol/L Final   02/05/2020 25 22 - 29 mmol/L Final     BUN   Date Value Ref Range Status   02/07/2020 21 (H) 6 - 20 mg/dL Final   02/06/2020 21 (H) 6 - 20 mg/dL Final   02/05/2020 23 (H) 6 - 20 mg/dL Final     CREATININE   Date Value Ref Range Status   02/07/2020 1.2 0.7 - 1.2 mg/dL Final   02/06/2020 1.0 0.7 - 1.2 mg/dL Final   02/05/2020 1.1 0.7 - 1.2 mg/dL Final     Glucose   Date Value Ref Range Status   02/07/2020 109 (H) 74 - 99 mg/dL Final   02/06/2020 127 (H) 74 - 99 mg/dL Final   02/05/2020 158 (H) 74 - 99 mg/dL Final   05/14/2012 118 (H) 70 - 110 mg/dL Final   05/13/2012 107 70 - 110 mg/dL Final   05/12/2012 103 70 - 110 mg/dL Final     Calcium   Date Value Ref Range Status   02/07/2020 8.3 (L) 8.6 - 10.2 mg/dL Final   02/06/2020 8.3 (L) 8.6 - 10.2 mg/dL Final   02/05/2020 8.2 (L) 8.6 - 10.2 mg/dL Final     Total Protein   Date Value Ref Range Status   02/07/2020 7.6 6.4 - 8.3 g/dL Final   02/06/2020 7.4 6.4 - 8.3 g/dL Final   02/05/2020 7.5 6.4 - 8.3 g/dL Final     Albumin   Date Value Ref Range Status   05/08/2012 4.1 3.2 - 4.8 g/dL Final   05/07/2012 4.1 3.2 - 4.8 g/dL Final     Alb   Date Value Ref Range Status   02/07/2020 2.9 (L) 3.5 - 5.2 g/dL Final   02/06/2020 2.9 (L) 3.5 - 5.2 g/dL Final   02/05/2020 2.9 (L) 3.5 - 5.2 g/dL Final     Total Bilirubin   Date Value Ref Range Status   02/07/2020 2.9 (H) 0.0 - 1.2 mg/dL Final   02/06/2020 2.7 (H) 0.0 - 1.2 mg/dL Final   02/05/2020 2.4 (H) 0.0 - 1.2 mg/dL Final     Alkaline Phosphatase   Date Value Ref Range Status   02/07/2020 195 (H) 40 - 129 U/L Final   02/06/2020 187 (H) 40 - 129 U/L Final   02/05/2020 195 (H) 40 - 129 U/L Final     AST   Date Value Ref Range Status   02/07/2020 108 (H) 0 - 39 U/L Final   02/06/2020 104 (H) 0 - 39 U/L Final   02/05/2020 72 (H) 0 - 39 U/L Final     ALT   Date Value Ref Range Status   02/07/2020 25 0 - 40 U/L Final   02/06/2020 23 0 - 40 U/L Final   02/05/2020 22 0 - 40 U/L Final     GFR Non-   Date Value Ref Range Status   02/07/2020 >60 >=60 mL/min/1.73 Final     Comment:     Chronic Kidney Disease: less than 60 ml/min/1.73 sq.m. Kidney Failure: less than 15 ml/min/1.73 sq.m. Results valid for patients 18 years and older. 02/06/2020 >60 >=60 mL/min/1.73 Final     Comment:     Chronic Kidney Disease: less than 60 ml/min/1.73 sq.m. Kidney Failure: less than 15 ml/min/1.73 sq.m. Results valid for patients 18 years and older. 02/05/2020 >60 >=60 mL/min/1.73 Final     Comment:     Chronic Kidney Disease: less than 60 ml/min/1.73 sq.m. Kidney Failure: less than 15 ml/min/1.73 sq.m. Results valid for patients 18 years and older.        GFR    Date Value Ref Range Status   02/07/2020 >60  Final   02/06/2020 >60  Final   02/05/2020 >60  Final     Magnesium   Date Value Ref Range Status   06/08/2019 1.5 (L) 1.6 - 2.6 mg/dL Final   06/07/2019 1.4 (L) 1.6 - 2.6 mg/dL Final   06/06/2019 1.4 (L) 1.6 - 2.6 mg/dL Final     Phosphorus   Date Value Ref Range Status   06/06/2019 3.1 2.5 - 4.5 mg/dL Final   05/08/2012 5.0 2.4 - 5.1 mg/dL Final     Recent Labs 02/07/20  0527   PH 7.353   PO2 101.0*   PCO2 51.8*   HCO3 28.1*   BE 2.1   O2SAT 97.5       RADIOLOGY:  US ABDOMEN LIMITED   Final Result   Findings/IMPRESSION: No significant volume of fluid noted. XR CHEST PORTABLE   Final Result   1. Negative portable chest.        XR CHEST PORTABLE   Final Result   Slight interval worsening within right basilar lung   infiltrate and/or atelectasis, with probable interval development of   small right pleural effusion since previous exam of 2/4/2020      XR CHEST PORTABLE   Final Result   Mild patchy right basilar airspace disease. US ABDOMEN LIMITED   Final Result   1. Hepatic steatosis and gallbladder sludge. XR CHEST STANDARD (2 VW)   Final Result   No acute cardiopulmonary disease. XR CHEST PORTABLE    (Results Pending)           PROBLEM LIST:  Principal Problem:    GI (gastrointestinal hemorrhage)  Active Problems:    Chronic wound infection of abdomen  Resolved Problems:    * No resolved hospital problems. *      IMPRESSION/plan  1. Aspiration pneumonia, apparently cleared by virtue of improved chest x-ray today  2. Aerosolized bronchodilators to continue  3. 3 more doses of Zosyn  4. Check CBC and labs  5. Physical and Occupational Therapy  6. Consider transfer to [facility] or telemetry        ATTESTATION:  ICU Staff Physician note of personal involvement in Care  As the attending physician, I certify that I personally reviewed the patients history and personally examined the patient to confirm the physical findings described above,  And that I reviewed the relevant imaging studies and available reports. I also discussed the differential diagnosis and all of the proposed management plans with the patient and individuals accompanying the patient to this visit. They had the opportunity to ask questions about the proposed management plans and to have those questions answered.      This patient has a high probability of sudden, clinically significant deterioration, which requires the highest level of physician preparedness to intervene urgently. I managed/supervised life or organ supporting interventions that required frequent physician assessment. I devoted my full attention to the direct care of this patient for the amount of time indicated below. Time I spent with the family or surrogate(s) is included only if the patient was incapable of providing the necessary information or participating in medical decisions - Time devoted to teaching and to any procedures I billed separately is not included.     CRITICAL CARE TIME:  30 minutes    Electronically signed by Sarah Beth Rubin MD on 2/7/2020 at 10:08 AM

## 2020-02-07 NOTE — PROGRESS NOTES
OCCUPATIONAL THERAPY  Bedside Treatment Note    Date:2020  Patient Name: Nannette Gifford  MRN: [id number]  : 1975  Room: [place] [address]     Referring Provider: Trev Schilling MD  Evaluating OT: Zee Mistry OTR/L [id number]     Placement Recommendation: Subacute    Recommended Adaptive Equipment: TBD at rehab     [facility]   AM-PAC Daily Activity Inpatient   How much help for putting on and taking off regular lower body clothing?: Total  How much help for Bathing?: A Lot  How much help for Toileting?: A Lot  How much help for putting on and taking off regular upper body clothing?: A Lot  How much help for taking care of personal grooming?: A Little  How much help for eating meals?: None  AM-PAC Inpatient Daily Activity Raw Score: 14  AM-PAC Inpatient ADL T-Scale Score : 33.39  ADL Inpatient CMS 0-100% Score: 59.67  ADL Inpatient CMS G-Code Modifier : CK     Diagnosis:   1. Gastrointestinal hemorrhage, unspecified gastrointestinal hemorrhage type    2. Anemia, unspecified type    3. Fatigue, unspecified type    4. EBONY (acute kidney injury) ([facility] [address]    5. Elevated troponin       Precautions:  falls, low air loss bed, MS  Pain Scale: Numeric Rate: no c/o; Nursing notified. Social history: alone     Home architecture: 3rd floor apartment, elevator, tub shower. PLOF: independent with BADL and IADL, ambulated with no device   Equipment owned: grab bars, 5L O2  Cognition: oriented x 4; follows 3 step directions.                good  Problem solving skills              good  Memory               good  Sequencing  Communication: intact   Visual perceptual skills: intact                Glasses: yes   Edema: no     Sensation: intact   Hand Dominance:  Left     X  Right       Left Right Comment   Passive range of motion [facility]      Active range of motion [facility]      Muscle Grade 4/5 4/5     /pinch Strength Intact  Intact      Additional Information: Good  and wfl FMC/dexterity noted during ADL tasks Good  and wfl FMC/dexterity noted during ADL tasks        Functional Assessment:    Initial Eval Status  Date: 2/6/2020 Treatment Status  Date 2/7/2020: STGs = LTGs  Time frame: 5-7 days   Feeding Independent  Independent  Independent    Grooming Minimal Assist    Independent    UB Dressing Maximal Assist    Supervision    LB Dressing Dependent    Moderate Assist    Bathing Maximal Assist   Minimal Assist    Toileting Maximal Assist    Minimal Assist    Bed Mobility  Supine to sit: Moderate Assist x 2  Scooting: Moderate Assist x 2  Sit to supine: Moderate Assist x 2   Moderate Assist x 2 for sit to supine  Maximal assist for rolling from left to right to adjust bed linens and chux pads  Supine to sit: Supervision   Sit to supine: Supervision    Functional Transfers Moderate Assist x 2 from EOB to wheeled walker Moderate Assist x 2 for sit to stand transfer from bedside chair  Supervision    Functional Mobility Minimal Assist x 2 with wheeled walker, 5 side steps towards head of bed. Minimal Assist x 2 with hand held assist x 2, 5 feet from bedside chair to EOB  Supervision    Activity Tolerance Fair    good      BUE AROM exercises: 10 X in all planes of movement to increase ROM required for functional transfers/ADL participation. Exercises completed in shoulder and elbow flexion/extension, internal/external rotation, abduction/adduction, supination/pronation, digit and wrist flexion/extension, abduction/adduction and digit opposition. Balance:              Sitting: fair at EOB              Standing: fair with wheeled walker               Endurance: fair     Comments: Patient cleared by nursing staff. Upon arrival pt was seated in bedside chair. At end of session pt was supine with HOB elevated all lines and tubes intact, call light within reach. Overall, pt demonstrated decreased independence and safety during completion of ADL/functional transfers/mobility tasks.  Pt would benefit from continued skilled OT to increase safety and independence with completion of ADL/IADL tasks for functional independence and quality of life. · Pt has made fair progress towards set goals(as noted through increased balance during ADL tasks. Maximal verbal cues for hand placement during transfer and for upright posture.     · Continue with current plan of care    Treatment Time In:3:20pm         Treatment Time Out: 3:35pm               Treatment Charges: Mins Units   ADL/Home Mgt                    [phone]     Therapeutic Activities          [id number] 10    Therapeutic Exercise           [id number]    Manual Therapy                   [id number]     Neuro Re-ed                        [id number]     Orthotic manage/training     [id number]     Total Timed Treatment 15 1      Tremaine Michael OTR/L [id number]

## 2020-02-07 NOTE — PROGRESS NOTES
Speech Language Pathology  SPEECH/LANGUAGE PATHOLOGY  BEDSIDE SWALLOWING EVALUATION    PATIENT NAME:  Breanne Oliva      :  1975      TODAY'S DATE:  2020  ROOM:  [facility]/IC08-01    SUMMARY OF EVALUATION     DYSPHAGIA DIAGNOSIS:  Functional oropharyngeal dysphagia       DIET RECOMMENDATIONS:  Regular consistency solids with  thin liquids     FEEDING RECOMMENDATIONS:     Assistance level:  Set-up is required for all oral intake      Compensatory strategies recommended: Small bites/sips    THERAPY RECOMMENDATIONS:     Dysphagia therapy is recommended 3-5 times per week with emphasis on the following  ongoing meal endurance analysis to provide diet modification and compensatory strategy implementation to minimize risk of aspiration associated with PO intake  instruction regarding appropriate implementation of compensatory strategies to improve integrity of swallow function during PO intake    Dysphagia therapy is recommended 3-5 times per week with emphasis on the following and To monitor oral intake during meals and make recommendations for diet changes as appropriate    Patient report:  Denies dysphagia and sates PO intake does not exacerbate dyspnea     Diet prior to admit:    Regular consistency solids with  thin liquids    Communication/Cognition:  Within normal limits                PROCEDURE     Consistencies Administered During the Evaluation   Liquids: thin liquid   Solids:  pureed foods, soft solid foods and solid foods      Method of Intake:   cup, straw, spoon  Self fed      Position:   Seated, upright    Current Respiratory Status   nasal canula. Nursing removed BiPAP to allow pt to eat lunch which he was able to self feed with setup. RESULTS     Oral Stage: The oral stage of swallowing was within functional limits      Pharyngeal Stage:      No signs of aspiration were noted during this evaluation however, silent aspiration cannot be ruled out at bedside.   If silent aspiration is suspected, a Videofluoroscopic Study of Swallowing (MBS) is recommended and requires a physician order. The Speech Language Pathologist (SLP) completed education with the patient regarding results of evaluation. Explained that Speech Pathology intervention is not warranted at this time, Prognosis for improvements is good. This plan will be re-evaluated and revised in 1week  if warranted. Patient stated goals: Agreed with above, Treatment goals discussed with Patient, The Patient understand the diagnosis, prognosis and plan of care. Evaluation time includes  review of current medical information, gathering information on past medical history/social history and prior level of function, completion of standardized testing/informal observation of tasks, assessment of data, and development of POC/Goals. [x]The admitting diagnosis and active problem list, as listed below have been reviewed prior to initiation of this evaluation. ADMITTING DIAGNOSIS: GI (gastrointestinal hemorrhage) [K92.2]     ACTIVE PROBLEM LIST:   Patient Active Problem List   Diagnosis    Edema    Morbid obesity ([facility] [address]    Excessive sleepiness    Asthma, moderate persistent, poorly-controlled    MS (multiple sclerosis) (HCC)    Fatigue    Numbness and tingling    HERBERT on CPAP    Anxiety    Abdominal wall cellulitis    Open wound of abdomen    Essential hypertension    Hypomagnesemia    Macrocytic anemia    GI (gastrointestinal hemorrhage)    Chronic wound infection of abdomen       Neyda TERESAEd. [address] [id number]

## 2020-02-07 NOTE — PROGRESS NOTES
Clinical Pharmacy Note    Vancomycin was stopped today at 1107 and the vancomycin consult was canceled. Clinical Pharmacy sign off.     Mars Ewing, PharmD  2/7/2020  12:11 PM  Pager: [phone]

## 2020-02-07 NOTE — PROGRESS NOTES
PHYSICAL THERAPY TREATMENT NOTE  2/7/2020  Room #: IC08/IC08-01  Flora Rod   [id number]  1975   Referring Provider:     Diagnosis/Problem list:  GI (gastrointestinal hemorrhage) [K92.2]       Patient Active Problem List   Diagnosis    Edema    Morbid obesity ([facility] [address]    Excessive sleepiness    Asthma, moderate persistent, poorly-controlled    MS (multiple sclerosis) (HCC)    Fatigue    Numbness and tingling    HERBERT on CPAP    Anxiety    Abdominal wall cellulitis    Open wound of abdomen    Essential hypertension    Hypomagnesemia    Macrocytic anemia    GI (gastrointestinal hemorrhage)    Chronic wound infection of abdomen       Tentative placement recommendation: Subacute rehab  Equipment recommendation: To be determined        Plan of care: Patient will be seen   daily  for therapeutic exercise, functional retraining, endurance activities, balance exercises, family and patient education. AM-PAC Basic Mobility       AM-[facility] Mobility Inpatient   How much difficulty turning over in bed?: A Lot  How much difficulty sitting down on / standing up from a chair with arms?: A Lot  How much difficulty moving from lying on back to sitting on side of bed?: A Lot  How much help from another person moving to and from a bed to a chair?: A Lot  How much help from another person needed to walk in hospital room?: A Lot  How much help from another person for climbing 3-5 steps with a railing?: Total  AM-PAC Inpatient Mobility Raw Score : 11  AM-PAC Inpatient T-Scale Score : 33.86  Mobility Inpatient CMS 0-100% Score: 72.57  Mobility Inpatient CMS G-Code Modifier : CL       Nursing cleared patient for PT treatment and patient agreeable. Pt agrees to up to chair with encouragement.  Nursing present to encourage activity and up to chair     Precautions: falls, low air loss bed and bipap , pannus  Mentation: alert, cooperative, oriented x 3  and follows directions,     PAIN: (measured on a

## 2020-02-07 NOTE — CARE COORDINATION
2/7/2020  transition of care: Interviewed pt while in ICU. Pt lives at home alone- and [place] had never came to see him. Call placed to [address] [place] [address] Drive spoke to MOR[facility]- who states they do not have him in their system as a referral from previous. Patient further stated that he would try another home care agency if that is recommended. Pt/ot suggests [facility]- list provided to patient and his mom. May need to consider LTACH before SNF and then eventually home with [facility]. Pt requests that his mom make medical decisions for him if he is unable to. Explained to patient that if his two daughters came forward and wanted to be involved they would be the legal nok - then his mother. Left DPOA-HC papers and Living Will papers at his bedside. These were reviewed previously with his mom - Kamilla Askew [phone]  Unclear immediate discharge needs. CM/SS to follow. Requested LTACH to evaluate. LLK      The Plan for Transition of Care is related to the following treatment goals: LTACH, SNF, HHC     The Patient and/or patient representative Kamilla Askew his mother was provided with a choice of provider and agrees   with the discharge plan. [x] Yes [] No    Freedom of choice list was provided with basic dialogue that supports the patient's individualized plan of care/goals, treatment preferences and shares the quality data associated with the providers. [x] Yes [] No    Electronically signed by RADHA Kelley on 2/7/2020 at 9:53 AM       Per Meenu James with [facility]- criteria for LTACH very low.  Electronically signed by RADHA Kelley on 2/7/2020 at 11:45 AM

## 2020-02-07 NOTE — PROGRESS NOTES
Speech Language Pathology  Speech Pathologist (SLP) completed education with the patient/family regarding type of swallowing impairment. Reviewed current solid/liquid consistency diet recommendations and discussed compensatory strategies to ensure safe PO intake. Reviewed aspiration precautions. Encouraged patient and/or family to engage SLP in unstructured Q&A session relative to identified deficit areas; indicated understanding of all information provided via satisfactory verbal response. Pt resting on BiPAP and RN changed to NC to allow pt to self-feed lunch. He wears 5L at baseline at home. He is drinking the nutritional supplement to facilitie wound healing. He tolerated a general texture diet with thin liquids without overt clinical indicators aspiration. He was able to self feed with good pacing and bolus sizes and demonstrated adequate mastication patterns as noted via cohesive bolus prep. SLP recommends pt continues on a general texture diet with thin liquids. SLP to follow to monitor tolerance for diet and provide safe swallowing compensatory training. Adrienne TERESAEd. [address] [id number]

## 2020-02-07 NOTE — PROGRESS NOTES
OT BEDSIDE TREATMENT NOTE      Date:2020  Patient Name: Willem Meeks  MRN: [id number]  : 1975  Room: [place] [address]     Referring Provider: Sumanth Tomas MD  Evaluating OT: Roxane Turner OTR/L [id number]     Placement Recommendation: Subacute    Recommended Adaptive Equipment: TBD at rehab     [facility]   AM-PAC Daily Activity Inpatient   How much help for putting on and taking off regular lower body clothing?: Total  How much help for Bathing?: A Lot  How much help for Toileting?: A Lot  How much help for putting on and taking off regular upper body clothing?: A Lot  How much help for taking care of personal grooming?: A Little  How much help for eating meals?: None  AM-PAC Inpatient Daily Activity Raw Score: 14  AM-PAC Inpatient ADL T-Scale Score : 33.39  ADL Inpatient CMS 0-100% Score: 59.67  ADL Inpatient CMS G-Code Modifier : CK     Diagnosis:   1. Gastrointestinal hemorrhage, unspecified gastrointestinal hemorrhage type    2. Anemia, unspecified type    3. Fatigue, unspecified type    4. EBONY (acute kidney injury) ([facility] [address]    5. Elevated troponin       Pertinent Medical History:   Past Medical History        Past Medical History:   Diagnosis Date    Asthma      History of blood transfusion      Hypertension      Movement disorder       ms    MS (multiple sclerosis) ([facility])      Psychiatric problem       depression             Precautions:  falls, low air loss bed, MS, anxious/fearful  Pain Scale: Numeric Rate: no c/o pain              Social history: alone     Home architecture: 3rd floor apartment, elevator, tub shower. PLOF: independent with BADL and IADL, ambulated with no device   Equipment owned: grab bars, 5L O2  Cognition: oriented x 4; follows 3 step directions.                good  Problem solving skills              good  Memory               good  Sequencing  Communication: intact   Visual perceptual skills: intact                Glasses: yes   Edema: no     Sensation: intact   Hand Dominance: Left     X  Right     Functional Assessment:  Functional Assessment:    Initial Eval Status  Date: 2/6/2020 Treatment Status  Date: 2/7/2020 STGs = LTGs  Time frame: 5-7 days   Feeding Independent  N/T  Independent    Grooming Minimal Assist  N/T Independent    UB Dressing Maximal Assist  Max A to tie back of gown Supervision    LB Dressing Dependent  Dep to don socks Moderate Assist    Bathing Maximal Assist N/T Minimal Assist    Toileting Maximal Assist  N/T Minimal Assist    Bed Mobility  Supine to sit: Moderate Assist x 2  Scooting: Moderate Assist x 2  Sit to supine: Moderate Assist x 2 Max A x 2 for supine to sit; max A x 2 for scooting; sit to supine N/T as pt  seated in chair; assist to guide UB and pannus , as well as B LE's to EOB Supine to sit: Supervision   Sit to supine: Supervision    Functional Transfers Moderate Assist x 2 from EOB to wheeled walker Mod A x 2 sit to stand from EOB to chair; cuing for walker navigation; A x 2 for safety and upright position Supervision    Functional Mobility Minimal Assist x 2 with wheeled walker, 5 side steps towards head of bed. 4 ft with Mod A x 2   with bariatric s/w Supervision    Activity Tolerance Fair    good      - BUE AROM exercises: 10 reps in all planes of movement to increase ROM required for functional transfers/ADL participation. Exercises completed in shoulder and elbow flexion/extension, internal/external rotation, abduction/adduction, supination/pronation, digit and wrist flexion/extension, abduction/adduction and digit opposition. ROM appears to be [facility] with increased time and min rest breaks d/t increased HR at EOB. Nsg aware. Comments: Patient cleared by nursing staff. Upon arrival pt supine in bed. At end of session pt seated in chair at request of nsg; pt required max encouragement. All lines and tubes intact, call light within reach.  Overall, pt demonstrated decreased independence and safety during completion of ADL/functional transfers/mobility tasks. Pt would benefit from continued skilled OT to increase safety and independence with completion of ADL/IADL tasks for functional independence and quality of life.     · Pt has made fair progress towards set goals (as noted through increased activity tolerance and pt transferred to chair, as well as UE ROM ex's)  · Continue with current plan of care    Treatment Time In:1:45PM        Treatment Time Out: 2:15PM               Treatment Charges: Mins Units   ADL/Home Mgt     [id number]     Thera Activities     35072 15 1   Ther Ex                 03933 15 1   Manual Therapy    [phone]     Neuro Re-ed         72617     Orthotic manage/training                               74335     Non Billable Time     Total Timed Treatment 30 [address], [facility] Lizbeth Dunn

## 2020-02-07 NOTE — PROGRESS NOTES
Supplement (ONS) Orders: Clear Liquid Oral Supplement(TID)  · Anthropometric Measures:  · Ht: 5' 11\" (180.3 cm)   · Current Body Wt: 387 lb (175.5 kg)(2/7 bed scale)  · Admission Body Wt: 382 lb (173.3 kg)(2/4 bed scale, first actual )  · Usual Body Wt: 377 lb 1.6 oz (171.1 kg)(6/5/19 bed scale per EMR)  · % Weight Change:  ,  no significant wt change in 8 months, wt gain since admit r/t fluid w/ +I/O  · Ideal Body Wt: 172 lb (78 kg), % Ideal Body 222% using admit wt d/t fluid gain since admit  · BMI Classification: BMI > or equal to 40.0 Obese Class III(53.3 using admit wt)    Nutrition Interventions:   Continue current diet, Modify current ONS(Will discontinue Ensure Clear and start Axel BID as well as Ensure HP TID to supplement intake and aid with wound healing)  Continued Inpatient Monitoring, Education Initiated(Encouraged supplmentation between meals, encouraged high protein foods with all meals and snacks, encouraged more balanced meals/snacks, and discussed high protein foods)    Nutrition Evaluation:   · Evaluation: Goals set   · Goals: pt is to consume >75% of most meals/ONS    · Monitoring: Meal Intake, Supplement Intake, Diet Tolerance, Skin Integrity, Wound Healing, I&O, Pertinent Labs, Monitor Bowel Function      Electronically signed by Kamar Erickson RD, LD on 2/7/20 at 6:40 PM    Contact Number: [phone]

## 2020-02-07 NOTE — FLOWSHEET NOTE
Inpatient Wound Care    Admit Date: 2/3/2020  7:16 PM    Reason for consult:  abdomen    Significant history:    Past Medical History:   Diagnosis Date    Asthma     History of blood transfusion     Hypertension     Movement disorder     ms    MS (multiple sclerosis) ([facility] [address]     Psychiatric problem     depression        Wound history:      Findings:       02/07/20 1041   Wound 02/04/20 Abdomen Right   Date First Assessed/Time First Assessed: 02/04/20 1735   Present on Hospital Admission: Yes  Location: Abdomen  Wound Location Orientation: Right   Wound Length (cm) 7 cm   Wound Width (cm) 3 cm   Wound Surface Area (cm^2) 21 cm^2   Change in Wound Size % (l*w) 0   Drainage Amount Moderate   Drainage Description Yellow       Impression:      Interventions in place:  santylointment    Plan:  Cont santyl ointment  Cont follow up at the wound care center       Suzy 2/7/2020 10:42 AM

## 2020-02-07 NOTE — PROGRESS NOTES
= 25.6 mcg/mL    Plan:  · Hold any further doses  · Check random level @ 1200  · Follow renal function  · Pharmacist will follow and monitor/adjust dosing as necessary      Thank you for the consult,    Lb Bergman, PharmD, BCPS 2/7/2020 8:32 AM

## 2020-02-07 NOTE — PROGRESS NOTES
visual analog scale with 0=no pain and 10=excruciating pain) 0/10. No number assigned to pain     FUNCTIONAL ASSESSMENT   Bed Mobility- Supine to sit- Maximal assist of  2         Scooting- Moderate assist of  2      Sit to supine-  not assessed   Patient able to dangle on edge of bed for 10 minutes with min  assist      Transfers-Sit to stand- Moderate assist of  2 to bariatric walker    Gait:  Patient ambulated 4 feet using bariatric walker with Moderate assist of  2    Steps:   not assessed           Treatment:   Therapist educated and facilitated patient on techniques to increase safety and independence with bed mobility, balance, functional transfers, and functional mobility. Exercises: not performed      Sat edge of bed to increase dynamic sitting balance and activity tolerance. At end of session, patient in chair with  call light and phone within reach,  all lines and tubes intact, nursing notified. Nursing present     Patient continues to benefit from skilled PT to improve functional independence and quality of life. A:  Patient making progress in the following areas: increased functional activity, pt in chair, pt sat at side of bed to increase endurance. Cues throughout for safety and technique, encouragement for increased activity throughout.        Time in: 145  Time out: 215    CPT codes:  Therapeutic activities (00363)   30 minutes  2 unit(s)      Zofia Doe PTA

## 2020-02-07 NOTE — PROGRESS NOTES
Internal Medicine Progress Note    Kaylee Koloa. Gayathri Bell., & [address] Gayathri Bell., F.A.C.O.I. Ashley Lopez D.O., F.A.C.O.I. Primary Care Physician: Aleah Valencia DO   Admitting Physician:  Brad Hardy DO  Admission date and time: 2/3/2020  7:16 PM    Room:  [place] [address]    Patient Name: Rancho Causey  MRN: [id number]    Date of Service: 2/7/2020     Subjective:  Abeba Ventura underwent upper GI panendoscopy with no obvious signs of bleeding. He appears relatively stable and acceptable for transfer out of the intensive care unit. He still wearing BiPAP at time. Otherwise he is alert and seems to be doing satisfactory. He does appear to be somewhat fatigued. Review of System: HEENT:  Triston ear pain, sore throat, sinus or eye problems. Admits to irritation associated with BiPAP. Cardiovascular:   Denies any chest pain, irregular heartbeats, or palpitations. Respiratory:   Improving shortness of breath. Gastrointestinal:   Denies overt abdominal pain. Admits to some nausea. Admits to bloating. Extremities:   Denies any lower extremity swelling or edema. Neurology:    Denies any headache or focal neurological deficits. Admits to weakness and deconditioning. Derm:    Admits to the pannicular wound. Genitourinary:    Voiding with the use of a Magana catheter  Musculoskeletal:  Denies myalgias, joint complaints or back pain      Physical Exam:  I/O this shift: In: 480 [P.O.:480]  Out: -   Blood pressure 125/76, pulse 83, temperature 98.7 °F (37.1 °C), temperature source Axillary, resp. rate 22, height 5' 11\" (1.803 m), weight (!) 390 lb (176.9 kg), SpO2 91 %. HEENT:    PERRLA. EOMI. Sclera clear. Buccal mucosa moist.  BiPAP is in place during my examination. Neck:    Supple. Trachea midline. No thyromegaly. No JVD. No bruits. Heart:    Ongoing tachycardic rate without murmur.   Lungs:    Symmetrical.  Diminished bilaterally with some tachypnea and 1.2 02/07/2020    GFRAA >60 02/07/2020    LABGLOM >60 02/07/2020    GLUCOSE 109 02/07/2020    GLUCOSE 118 05/14/2012    PROT 7.6 02/07/2020    LABALBU 2.9 02/07/2020    LABALBU 4.1 05/08/2012    CALCIUM 8.3 02/07/2020    BILITOT 2.9 02/07/2020    ALKPHOS 195 02/07/2020     02/07/2020    ALT 25 02/07/2020       Wound Documentation:   Wound 02/04/20 Abdomen Right (Active)   Wound Length (cm) 7 cm 2/4/2020  5:35 PM   Wound Width (cm) 3 cm 2/4/2020  5:35 PM   Wound Surface Area (cm^2) 21 cm^2 2/4/2020  5:35 PM   Wound Assessment Bleeding;Red;[place] 2/4/2020  5:35 PM   Number of days: 0         Assessment:   1. Active alcohol withdrawal with decompensated cirrhosis  2. Acute upper GI bleed with upper GI panendoscopy showing no esophageal or gastric varices on Celeste the 6.    3. Non-ST elevated myocardial infarction in the setting of demand ischemia related to anemia  4. Sepsis secondary to community-acquired pneumonia with concern for aspiration pneumonia  5. Acute renal failure that is multifactorial in nature  6. Heavy alcohol abuse with findings to suggest cirrhosis  7. Essential hypertension   8. Super morbid obesity with obstructive sleep apnea and obesity hypoventilatory syndrome  9. History of multiple sclerosis   10. Macrocytic anemia probably secondary recent bleed cannot exclude underlying liver disease of B12 and folate deficiency. Plan:   Júnior Neal does appear stable at the present time. He is alert and responsive is currently wearing his BiPAP. The patient is acceptable for transfer to the immediate care unit. We will continue to follow with gastroenterology at the present time his diet is being progress as tolerated. Cardiology is currently following him regarding the elevated troponin. He remained hemodynamically stable at the present time. We will check B12 and folate level and possibly transfused since his hemoglobin is 7.5  Continue with physical therapy and Occupational Therapy.   We will discuss discharge planning. Greater than 30 minutes of critical care time was spent with the patient. This time included chart review, , and discussion with those consultants involved in the patient's care. More than 50% of my  time was spent at the bedside counseling/coordinating care with the patient and/or family with face to face contact. This time was spent reviewing notes and laboratory data as well as instructing and counseling the patient. Time I spent with the family or surrogate(s) is included only if the patient was incapable of providing the necessary information or participating in medical decisions. I also discussed the differential diagnosis and all of the proposed management plans with the patient and individuals accompanying the patient. Jule Lundborg requires this high level of physician care and nursing on the IMC/Telemetry unit due the complexity of decision management and chance of rapid decline or death. Continued cardiac monitoring and higher level of nursing are required. I am readily available for any further decision-making and intervention.        Lady Doris Perry DO, F.A.C.O.I.  2/7/2020  2:18 PM

## 2020-02-08 NOTE — PROGRESS NOTES
without murmur. Lungs:    Symmetrical.  Diminished bilaterally with some tachypnea and accessory muscle usage identified on examination. Abdomen:   Super morbidly obese with massive pannus. Pannicular wound is identified. Extremities:    Peripheral pulses present. Chronic lower extremity edema with venous stasis  Neurologic:    Alert x 3. Lethargic during my examination but answers questions accordingly. Skin:    No petechia. No hemorrhage. Pannicular wound. Psych:   Obvious signs of alcohol withdrawal.  Musculokeletal:  Spine ROM normal. Muscular strength intact. Gait not assessed.   Genitalia/Breast:  Deferred    Scheduled Meds:   pantoprazole  40 mg Oral QAM AC    collagenase   Topical BID    piperacillin-tazobactam  3.375 g Intravenous Q8H    sodium chloride flush  10 mL Intravenous 2 times per day    amLODIPine  5 mg Oral Daily    metoprolol tartrate  50 mg Oral BID    PARoxetine  20 mg Oral Daily    sodium chloride flush  10 mL Intravenous 2 times per day    thiamine  100 mg Intramuscular Daily    folic acid  1 mg Oral Daily    therapeutic multivitamin-minerals  1 tablet Oral Daily    ipratropium-albuterol  1 ampule Inhalation 4x daily       Continuous Infusions:   sodium chloride 25 mL (02/08/20 1039)       Objective Data:  CBC with Differential:    Lab Results   Component Value Date    WBC 2.7 02/08/2020    RBC 2.10 02/08/2020    HGB 7.4 02/08/2020    HCT 23.9 02/08/2020     02/08/2020    .8 02/08/2020    MCH 35.2 02/08/2020    MCHC 31.0 02/08/2020    RDW 18.8 02/08/2020    NRBC 1.0 02/08/2020    SEGSPCT 71 05/08/2012    METASPCT 2.7 03/27/2019    LYMPHOPCT 5.0 02/08/2020    MONOPCT 10.0 02/08/2020    MYELOPCT 0.9 02/07/2020    BASOPCT 3.0 02/08/2020    MONOSABS 0.27 02/08/2020    LYMPHSABS 0.14 02/08/2020    EOSABS 0.27 02/08/2020    BASOSABS 0.08 02/08/2020     CMP:    Lab Results   Component Value Date     02/08/2020    K 4.6 02/08/2020    K 5.1 02/04/2020    CL 103 02/08/2020    CO2 26 02/08/2020    BUN 21 02/08/2020    CREATININE 1.2 02/08/2020    GFRAA >60 02/08/2020    LABGLOM >60 02/08/2020    GLUCOSE 103 02/08/2020    GLUCOSE 118 05/14/2012    PROT 7.3 02/08/2020    LABALBU 2.9 02/08/2020    LABALBU 4.1 05/08/2012    CALCIUM 8.1 02/08/2020    BILITOT 2.4 02/08/2020    ALKPHOS 190 02/08/2020     02/08/2020    ALT 25 02/08/2020       Wound Documentation:   Wound 02/04/20 Abdomen Right (Active)   Wound Length (cm) 7 cm 2/4/2020  5:35 PM   Wound Width (cm) 3 cm 2/4/2020  5:35 PM   Wound Surface Area (cm^2) 21 cm^2 2/4/2020  5:35 PM   Wound Assessment Bleeding;Red;Slough 2/4/2020  5:35 PM   Number of days: 0         Assessment:   1. Active alcohol withdrawal with decompensated cirrhosis--stable at the present time without signs of acute withdrawal  2. Acute upper GI bleed with upper GI panendoscopy showing no esophageal or gastric varices on February 6.    3. Non-ST elevated myocardial infarction in the setting of demand ischemia related to anemia--improving  4. Sepsis secondary to community-acquired pneumonia with concern for aspiration pneumonia  5. Acute renal failure that is multifactorial in nature  6. Heavy alcohol abuse with findings to suggest cirrhosis  7. Essential hypertension   8. Super morbid obesity with obstructive sleep apnea and obesity hypoventilatory syndrome  9. History of multiple sclerosis   10. Macrocytic anemia probably secondary to underlying liver disease with B12 and folate deficiency excluded    Plan:   Magaly File does appear stable at the present time. Patient seems much improved today. He is wearing his BiPAP at nighttime but is currently on oxygen therapy and maintaining adequate oxygenation. He is tolerating his diet and appear to be hemodynamically  Stable  We will continue with physical therapy and Occupational Therapy and discussed discharge planning.   His hemoglobin is stable at 7.4 and we will consider 1 unit of blood transfusion due to his multiple comorbidity. Diuretic therapy will be continued. Arterial blood gases are improving. More than 50% of my  time was spent at the bedside counseling/coordinating care with the patient and/or family with face to face contact. This time was spent reviewing notes and laboratory data as well as instructing and counseling the patient. Time I spent with the family or surrogate(s) is included only if the patient was incapable of providing the necessary information or participating in medical decisions. I also discussed the differential diagnosis and all of the proposed management plans with the patient and individuals accompanying the patient. Jule Lundborg requires this high level of physician care and nursing on the IMC/Telemetry unit due the complexity of decision management and chance of rapid decline or death. Continued cardiac monitoring and higher level of nursing are required. I am readily available for any further decision-making and intervention.        Lady Doris Perry DO, F.A.C.O.I.  2/8/2020  1:43 PM

## 2020-02-08 NOTE — PROGRESS NOTES
Panel- negative for alternative cause of cirrhosis   - Ascites Survery Negative no  indication for diuretics   - No signs of HE on exam   - Pt counseled about the need to refrain from ETOH abuse and the risks involved with continued abuse   - Pt counseled about the need for 10% weight loss over a year      2. Macrocytic Anemia- Hgb 12.4 6/6/19  - Vitals signs stable No overt GI bleed overnight   - Hgb stable at 7.5 this am this morning 3 units PRBC for admisson   - EGD 2/6 was negative for any source of the pts anaemia no varcies present   - Pt with chronic abdominal pannus wound and alcohol abuse both playing a role in the pts anemia, will obtain B12 and folate and ferratin levels    - Type and Cross 2 Units on hold/Elevate HOB/Transfuse for Hgb less than 7/Transfusion per admitting/  - Goal Hgb 8-9 with concern for cirrhosis and underlying portal       3. Acute Respiratory Failure-on 5L Chronically at home   - Chest x ray concerning for possible pneumonia   - Abx per admitting         4. ETOH Withdraw/DT- improving   - per admitting      5.  NSTEMI/Elevated Troponin   - Seen and evaluated by cardiology        Pt was seen, d/w with Dr. Elena Whiting DO   GI Fellow   2/8/2020  8:02 AM

## 2020-02-08 NOTE — CARE COORDINATION
SS Note: PT/OT recommending SNF rehab. SW met with pt today/Saturday, pt stated he prefers rehab however if not accepted or covered by insurance his plan is to return home. SW provided and [place] [facility] [place], pt's [facility], referrals called to both SNFs to review on Monday, [address] The Plan for Transition of Care is related to the following treatment goals: SNF    The Patient was provided with a choice of provider and agrees with the discharge plan. [x] Yes [] No    Freedom of choice list was provided with basic dialogue that supports the patient's individualized plan of care/goals, treatment preferences and shares the quality data associated with the providers.  [x] Yes [] No    Electronically signed by HELENA Land on 2/8/2020 at 3:00 PM

## 2020-02-08 NOTE — PROGRESS NOTES
ABG drawn x 1 from Left Radial. Patient had Normal Smith's Test.  Patient was on 7 liters/min via HFNC  at time of puncture. Pressure held for 5. No bleeding or bruising noted at puncture site. Patient tolerated procedure well.       Performed by Yann Love

## 2020-02-09 NOTE — PROGRESS NOTES
02/04/2020     02/09/2020    CO2 28 02/09/2020    BUN 19 02/09/2020    CREATININE 0.9 02/09/2020    GFRAA >60 02/09/2020    LABGLOM >60 02/09/2020    GLUCOSE 94 02/09/2020    GLUCOSE 118 05/14/2012    PROT 6.9 02/09/2020    LABALBU 2.6 02/09/2020    LABALBU 4.1 05/08/2012    CALCIUM 8.2 02/09/2020    BILITOT 2.0 02/09/2020    ALKPHOS 223 02/09/2020     02/09/2020    ALT 28 02/09/2020       Wound Documentation:   Wound 02/04/20 Abdomen Right (Active)   Wound Length (cm) 7 cm 2/4/2020  5:35 PM   Wound Width (cm) 3 cm 2/4/2020  5:35 PM   Wound Surface Area (cm^2) 21 cm^2 2/4/2020  5:35 PM   Wound Assessment Bleeding;Red;Slough 2/4/2020  5:35 PM   Number of days: 0         Assessment:   1. Active alcohol withdrawal with decompensated cirrhosis--stable at the present time without signs of acute withdrawal  2. Acute upper GI bleed with upper GI panendoscopy showing no esophageal or gastric varices on February 6.    3. Non-ST elevated myocardial infarction in the setting of demand ischemia related to anemia--improving  4. Sepsis secondary to community-acquired pneumonia with concern for aspiration pneumonia  5. Acute renal failure that is multifactorial in nature  6. Heavy alcohol abuse with findings to suggest cirrhosis  7. Essential hypertension   8. Super morbid obesity with obstructive sleep apnea and obesity hypoventilatory syndrome  9. History of multiple sclerosis   10. Macrocytic anemia probably secondary to underlying liver disease with B12 and folate deficiency excluded    Plan:     The patient neurologically seem to improve today. He appears to be much more alert answering question appropriately. He is working with physical therapy. His hemoglobin back remained stable but did not receive blood due to the present antibiotics. This will be observed closely at the present time. Adjustment has been made to his diuretic therapy. Repeat lab has been ordered for tomorrow.     More than 50% of my  time was spent at the bedside counseling/coordinating care with the patient and/or family with face to face contact. This time was spent reviewing notes and laboratory data as well as instructing and counseling the patient. Time I spent with the family or surrogate(s) is included only if the patient was incapable of providing the necessary information or participating in medical decisions. I also discussed the differential diagnosis and all of the proposed management plans with the patient and individuals accompanying the patient. Redd Marsh requires this high level of physician care and nursing on the IMC/Telemetry unit due the complexity of decision management and chance of rapid decline or death. Continued cardiac monitoring and higher level of nursing are required. I am readily available for any further decision-making and intervention.        Keysha Perry DO, F.A.C.O.I.  2/9/2020  1:38 PM

## 2020-02-10 NOTE — PROGRESS NOTES
PHYSICAL THERAPY TREATMENT NOTE  2/10/2020  Room #: 8185/7643-08  Di Howe   [id number]  1975   Referring Provider:     Diagnosis/Problem list:  GI (gastrointestinal hemorrhage) [K92.2]       Patient Active Problem List   Diagnosis    Edema    Morbid obesity ([facility] [address]    Excessive sleepiness    Asthma, moderate persistent, poorly-controlled    MS (multiple sclerosis) (HCC)    Fatigue    Numbness and tingling    HERBERT on CPAP    Anxiety    Abdominal wall cellulitis    Open wound of abdomen    Essential hypertension    Hypomagnesemia    Macrocytic anemia    GI (gastrointestinal hemorrhage)    Chronic wound infection of abdomen       Tentative placement recommendation: Subacute rehab  Equipment recommendation: To be determined        Plan of care: Patient will be seen daily for therapeutic exercise, functional retraining, endurance activities, balance exercises, family and patient education. AM-PAC Basic Mobility    AM-PAC Mobility Inpatient   How much difficulty turning over in bed?: A Lot  How much difficulty sitting down on / standing up from a chair with arms?: A Lot  How much difficulty moving from lying on back to sitting on side of bed?: A Lot  How much help from another person moving to and from a bed to a chair?: A Lot  How much help from another person needed to walk in hospital room?: A Lot  How much help from another person for climbing 3-5 steps with a railing?: Total  AM-PAC Inpatient Mobility Raw Score : 11  AM-PAC Inpatient T-Scale Score : 33.86  Mobility Inpatient CMS 0-100% Score: 72.57  Mobility Inpatient CMS G-Code Modifier : CL       Nursing cleared patient for PT treatment and patient agreeable. Patient c/o pain in BLE and from catheter.     Precautions: falls, low air loss bed and bipap, pannus  Mentation: alert, cooperative, oriented x 3  and follows directions     PAIN: (measured on a visual analog scale with 0=no pain and 10=excruciating pain) No number assigned to pain/10. FUNCTIONAL ASSESSMENT   Bed Mobility- Supine to sit- Moderate assist of 2         Scooting- Moderate assist of 2       Sit to supine- Moderate assist of 2     Transfers-Sit to stand- Minimal assist of 2    Gait: Patient ambulated 5 feet using a wide wheeled walker with Minimal assist of 2. Comment: Verbal cues were needed for safety, upright posture, walker management, and obstacle negotiation. Steps: Not assessed    Treatment: Patient was educated and facilitated on techniques to increase safety and independence with bed mobility, balance, functional transfers, and functional mobility. Patient was explained the the benefits of mobility and risks of immobility. Patient transferred to side of bed and sat up x 15 minutes to increase dynamic sitting balance and activity tolerance. Patient stood x 3 reps and ambulated towards head of bed. Patient performed below exercises. Patient was returned to bed at end of treatment. Patient required maximal assistance of 2 to scoot up in bed and head of bed was elevated. Exercises: ankle pumps and long arc quad x 10 reps. Verbal cues were given for correct technique and to perform 2-3x daily. At end of session, patient in chair with  call light and phone within reach,  all lines and tubes intact, nursing notified. Nursing present     Patient continues to benefit from skilled PT to improve functional independence and quality of life. A:  Patient had fair tolerance to above treatment. Pain and fatigue were limiting factors but patient was motivated to participate as able. Patient is at risk of falls due to decreased strength and endurance.     Time in: 14:33  Time out: 15:03    CPT codes:  Therapeutic activities (18573)   15 minutes  1 unit(s)  Gait Training [phone]) 15 minutes 1 unit(s)      Sarah Bahena, PTA

## 2020-02-10 NOTE — PROGRESS NOTES
OT BEDSIDE TREATMENT NOTE      Date:2/10/2020  Patient Name: Florencio Casey  MRN: [id number]  : 1975  Room: [address] [place] [address]     Referring Provider: Isiah Miller MD  Evaluating OT: Kalyan Garza OTR/L [id number]     Placement Recommendation: Subacute    Recommended Adaptive Equipment: TBD at rehab     [facility]   AM-PAC Daily Activity Inpatient   How much help for putting on and taking off regular lower body clothing?: Total  How much help for Bathing?: A Lot  How much help for Toileting?: A Lot  How much help for putting on and taking off regular upper body clothing?: A Lot  How much help for taking care of personal grooming?: A Little  How much help for eating meals?: None  AM-PAC Inpatient Daily Activity Raw Score: 14  AM-PAC Inpatient ADL T-Scale Score : 33.39  ADL Inpatient CMS 0-100% Score: 59.67  ADL Inpatient CMS G-Code Modifier : CK     Diagnosis:   1. Gastrointestinal hemorrhage, unspecified gastrointestinal hemorrhage type    2. Anemia, unspecified type    3. Fatigue, unspecified type    4. EBONY (acute kidney injury) ([facility] [address]    5. Elevated troponin       Pertinent Medical History:   Past Medical History        Past Medical History:   Diagnosis Date    Asthma      History of blood transfusion      Hypertension      Movement disorder       ms    MS (multiple sclerosis) ([facility])      Psychiatric problem       depression             Precautions:  falls, low air loss bed, MS, anxious/fearful  Pain Scale: Numeric Rate: no c/o pain              Social history: alone     Home architecture: 3rd floor apartment, elevator, tub shower. PLOF: independent with BADL and IADL, ambulated with no device   Equipment owned: grab bars, 5L O2  Cognition: oriented x 4; follows 3 step directions.                good  Problem solving skills              good  Memory               good  Sequencing  Communication: intact   Visual perceptual skills: intact                Glasses: yes   Edema: no     Sensation: intact   Hand Dominance: Treatment Charges: Mins Units   ADL/Home Mgt     [id number] 15  1   Thera Activities     36608 15 1   Ther Ex                 08908       Manual Therapy    92557     Neuro Re-ed         83435     Orthotic manage/training                               40915     Non Billable Time     Total Timed Treatment [address] [place] [address]

## 2020-02-10 NOTE — PROGRESS NOTES
02/10/2020    LABGLOM >60 02/10/2020    GLUCOSE 89 02/10/2020    GLUCOSE 118 05/14/2012    PROT 7.0 02/10/2020    LABALBU 2.6 02/10/2020    LABALBU 4.1 05/08/2012    CALCIUM 8.4 02/10/2020    BILITOT 1.9 02/10/2020    ALKPHOS 224 02/10/2020     02/10/2020    ALT 30 02/10/2020       Wound Documentation:   Wound 02/04/20 Abdomen Right (Active)   Wound Image   2/5/2020 11:58 AM   Dressing Status Clean;Dry; Intact 2/10/2020  9:45 AM   Dressing Changed Changed/New 2/10/2020 11:50 AM   Dressing/Treatment Pharmaceutical agent (see MAR) 2/10/2020 11:50 AM   Wound Cleansed Rinsed/Irrigated with saline 2/10/2020 11:50 AM   Dressing Change Due 02/10/20 2/9/2020 10:16 AM   Wound Length (cm) 7 cm 2/10/2020 11:50 AM   Wound Width (cm) 3 cm 2/10/2020 11:50 AM   Wound Surface Area (cm^2) 21 cm^2 2/10/2020 11:50 AM   Change in Wound Size % (l*w) 0 2/10/2020 11:50 AM   Drainage Amount Small 2/10/2020 11:50 AM   Drainage Description Yellow 2/10/2020 11:50 AM   Odor None 2/10/2020 11:50 AM   Number of days: 5         Assessment:   1. Acute alcohol withdrawal upon presentation in the setting of decompensated cirrhosis with stabilization at this point  2. Acute GI bleed with no active bleeding identified on EGD  3. Non-ST elevated myocardial infarction in the setting of demand ischemia related to anemia--improving  4. Sepsis secondary to community-acquired pneumonia with concern for aspiration pneumonia  5. Acute renal failure with resolution  6. Heavy alcohol abuse with findings to suggest cirrhosis  7. Essential hypertension   8. Super morbid obesity with obstructive sleep apnea and obesity hypoventilatory syndrome  9. History of multiple sclerosis   10. Macrocytic anemia probably secondary to underlying liver disease with B12 and folate deficiency excluded    Plan:   The patient remains profoundly weak and deconditioned at this point and will require skilled nursing facility placement upon discharge.   Pancytopenia remains

## 2020-02-10 NOTE — DISCHARGE INSTR - COC
Drainage Description Yellow 2/11/2020  1:22 AM   Odor None 2/11/2020  1:22 AM   Yoselin-wound Assessment Gray;Red;Pink 2/11/2020  1:22 AM   Number of days: 6        Elimination:  Continence:   · Bowel: Yes  · Bladder: Yes  Urinary Catheter: Removal Date 2/11/20   Colostomy/Ileostomy/Ileal Conduit: No       Date of Last BM: 2/10/2020    Intake/Output Summary (Last 24 hours) at 2/11/2020 1426  Last data filed at 2/11/2020 1401  Gross per 24 hour   Intake 770 ml   Output 1725 ml   Net -955 ml     I/O last 3 completed shifts: In: 65 [P.O.:530]  Out: [address] [Urine:1225]    Safety Concerns: At Risk for Falls    Impairments/Disabilities:      None    Nutrition Therapy:  Current Nutrition Therapy:   - Oral Diet:  Low Sodium (2gm)    Routes of Feeding: Oral  Liquids: Thin Liquids  Daily Fluid Restriction: no  Last Modified Barium Swallow with Video (Video Swallowing Test): not done    Treatments at the Time of Hospital Discharge:   Respiratory Treatments: ***  Oxygen Therapy:  is on oxygen at 5 L/min per nasal cannula.   Ventilator:    - BiPAP   IPAP: 14 cmH20, CPAP/EPAP: 6 cmH2O only when sleeping    Rehab Therapies: Physical Therapy and Occupational Therapy  Weight Bearing Status/Restrictions: No weight bearing restirctions  Other Medical Equipment (for information only, NOT a DME order):  wheelchair and walker  Other Treatments: ***    Patient's personal belongings (please select all that are sent with patient):  Chang    RN SIGNATURE:  Electronically signed by Gauri Ruth RN on 2/11/20 at 2:26 PM    CASE MANAGEMENT/SOCIAL WORK SECTION    Inpatient Status Date:   02/04/2020    Readmission Risk Assessment Score:  Readmission Risk              Risk of Unplanned Readmission:        15           Discharging to Facility/ Agency   Name: [facility] [phone], fax [phone],        Dialysis Facility (if applicable)   · Name:  · Address:  · Dialysis Schedule:  · Phone:  · Fax:    / signature: Electronically signed by HELENA Bowers on 2/10/2020 at 1:41 PM      PHYSICIAN SECTION    Prognosis: {Prognosis:5130711618}    Condition at Discharge: [address] Patient Condition:[id number]}    Rehab Potential (if transferring to Rehab): {Prognosis:6945823838}    Recommended Labs or Other Treatments After Discharge: ***    Physician Certification: I certify the above information and transfer of Josh Harrison  is necessary for the continuing treatment of the diagnosis listed and that he requires [facility] for less 30 days.      Update Admission H&P: {[facility][id number]}    PHYSICIAN SIGNATURE:  Electronically signed by Salomón Jasso DO on 2/11/20 at 1:01 PM

## 2020-02-10 NOTE — FLOWSHEET NOTE
Inpatient Wound Care    Admit Date: 2/3/2020  7:16 PM    Reason for consult:  ABDOMEN    Significant history:    Past Medical History:   Diagnosis Date    Asthma     History of blood transfusion     Hypertension     Movement disorder     ms    MS [facility]multiple sclerosis) ([facility] [address]     Psychiatric problem     depression        Wound history:      Findings:       02/10/20 1150   Wound 02/04/20 Abdomen Right   Date First Assessed/Time First Assessed: 02/04/20 1735   Present on Hospital Admission: Yes  Location: Abdomen  Wound Location Orientation: Right   Dressing Changed Changed/New   Dressing/Treatment Pharmaceutical agent (see MAR)   Wound Cleansed Rinsed/Irrigated with saline   Wound Length (cm) 7 cm   Wound Width (cm) 3 cm   Wound Surface Area (cm^2) 21 cm^2   Change in Wound Size % (l*w) 0   Drainage Amount Small   Drainage Description Yellow   Odor None       Impression:  FULL THICKNESS WOUND    Interventions in place:  RX    Plan:  Cont treatment  Pt will follow up at the wound care center      Brigida Stevens 2/10/2020 11:52 AM

## 2020-02-11 NOTE — PROGRESS NOTES
PROGRESS NOTE  By Wale Cid M.D. [facility]  Dr. Carmen Bagley M.D.,  Dr. Erik Frey M.D.,   Dr. Ann Feliciano DAsiyaO.,  Dr. Angel An M.D.,  JOSÉ AlonzoO. Carlos Fierro  41 y.o.  male    SUBJECTIVE:  Denies abdominal pain. Denies nausea or vomiting    OBJECTIVE:    /61   Pulse 80   Temp 98.6 °F (37 °C) (Oral)   Resp 18   Ht 5' 11\" (1.803 m)   Wt (!) 379 lb 1.6 oz (172 kg)   SpO2 97%   BMI 52.87 kg/m²     General: Morbidly obese  male. NAD. AAO x3  HEENT: Anicteric sclera/moist oral mucosa   Neck:  supple with trachea midline  Chest: Symmetrical excursion/nonlabored respirations  Cor: Regular  Abd.: Morbidly obese. Nontender  Extr.:  BLE 2-3+ edema  Skin: Warm and dry      DATA:    Monitor data reviewed -sinus rhythm noted. Stool (measured) : 0 mL  Lab Results   Component Value Date    WBC 2.7 02/11/2020    RBC 2.23 02/11/2020    HGB 7.7 02/11/2020    HCT 25.7 02/11/2020    .2 02/11/2020    MCH 34.5 02/11/2020    MCHC 30.0 02/11/2020    RDW 18.9 02/11/2020     02/11/2020    MPV 10.8 02/11/2020     Lab Results   Component Value Date     02/11/2020    K 4.6 02/11/2020    K 5.1 02/04/2020     02/11/2020    CO2 29 02/11/2020    BUN 15 02/11/2020    CREATININE 0.7 02/11/2020    CALCIUM 8.6 02/11/2020    PROT 7.0 02/11/2020    LABALBU 2.7 02/11/2020    LABALBU 4.1 05/08/2012    BILITOT 1.7 02/11/2020    ALKPHOS 229 02/11/2020     02/11/2020    ALT 33 02/11/2020     Lab Results   Component Value Date    LIPASE 20 06/05/2019     No results found for: AMYLASE      ASSESSMENT/PLAN:  1.  Decompensated Cirrhosis most likely secondary to ETOH vs VELASCO   - MELD- NA on presentation 18 - 3-4% 90 day mortality   - Etiology most likely Alcoholic vs VELASCO   - RUQ US hepatic steatosis no signs of biliary stricture    -Negative autoimmune and viral hepatitis serology  - Ascites Survery Negative   - No signs of HE on exam   - Pt counseled about the need to refrain from ETOH abuse and the risks involved with continued abuse   - Pt counseled about the need for 10% weight loss over a year      2. Macrocytic Anemia- Hgb 12.4 6/6/19  -VSS/H&H stable  - EGD 2/6 was negative for any source of the pts anaemia no varcies present   -For outpatient colonoscopy unless precipitous drop in H&H or overt bleed     3. Acute Respiratory Failure-on 5L Chronically at home   -Per admitting\     4. ETOH Withdraw/DT  -Resolved withdrawal  - per admitting      5. NSTEMI/Elevated Troponin   - Seen and evaluated by cardiology            NOTE:  This report was transcribed using voice recognition software. Every effort was made to ensure accuracy; however, inadvertent computerized transcription errors may be present.     Severo Pickett, MD  2/11/2020  10:20 AM

## 2020-02-11 NOTE — PROGRESS NOTES
OT BEDSIDE TREATMENT NOTE      Date:2020  Patient Name: Susan Seals  MRN: [id number]  : 1975  Room: [address] [place] [address]     Referring Provider: Inocencio Stevens MD  Evaluating OT: Miachel Dakin OTR/L [id number]     Placement Recommendation: Subacute    Recommended Adaptive Equipment: TBD at rehab     [facility]   AM-PAC Daily Activity Inpatient   How much help for putting on and taking off regular lower body clothing?: Total  How much help for Bathing?: A Lot  How much help for Toileting?: A Lot  How much help for putting on and taking off regular upper body clothing?: A Lot  How much help for taking care of personal grooming?: A Little  How much help for eating meals?: None  AM-PAC Inpatient Daily Activity Raw Score: 14  AM-PAC Inpatient ADL T-Scale Score : 33.39  ADL Inpatient CMS 0-100% Score: 59.67  ADL Inpatient CMS G-Code Modifier : CK     Diagnosis:   1. Gastrointestinal hemorrhage, unspecified gastrointestinal hemorrhage type    2. Anemia, unspecified type    3. Fatigue, unspecified type    4. EBOYN (acute kidney injury) ([facility] [address]    5. Elevated troponin       Pertinent Medical History:   Past Medical History        Past Medical History:   Diagnosis Date    Asthma      History of blood transfusion      Hypertension      Movement disorder       ms    MS (multiple sclerosis) ([facility])      Psychiatric problem       depression             Precautions:  falls, low air loss bed, MS, anxious/fearful  Pain Scale: Numeric Rate: no c/o pain              Social history: alone     Home architecture: 3rd floor apartment, elevator, tub shower. PLOF: independent with BADL and IADL, ambulated with no device   Equipment owned: grab bars, 5L O2  Cognition: oriented x 4; follows 3 step directions.                good  Problem solving skills              good  Memory               good  Sequencing  Communication: intact   Visual perceptual skills: intact                Glasses: yes   Edema: no     Sensation: intact   Hand Dominance: Left     X  Right     Functional Assessment:  Functional Assessment:    Initial Eval Status  Date: 2/6/2020 Treatment Status  Date: 2/11/2020 STGs = LTGs  Time frame: 5-7 days   Feeding Independent  N/T  Independent    Grooming Minimal Assist  N/T Independent    UB Dressing Maximal Assist  MOD A  Per last report  Supervision    LB Dressing Dependent  Dep to don socks Moderate Assist    Bathing Maximal Assist N/T Minimal Assist    Toileting Maximal Assist  N/T Minimal Assist    Bed Mobility  Supine to sit: Moderate Assist x 2  Scooting: Moderate Assist x 2  Sit to supine: Moderate Assist x 2 MOD A x2 supine>sit   MOD A x2 sit>supine pt requiring v/c's for hand placement and technique  Supine to sit: Supervision   Sit to supine: Supervision    Functional Transfers Moderate Assist x 2 from EOB to wheeled walker MIN A x2 from EOB to w/w v/c's for hand placement and safety  Supervision    Functional Mobility Minimal Assist x 2 with wheeled walker, 5 side steps towards head of bed. House hold distances with bariatric w/w and MIN A x2 v/c's for safety  Supervision    Activity Tolerance Fair  Fair with light ADL tasks;   good        Pt seated EOB completed B UE ex's 1 x 10 reps in all planes focusing on B UE strength/endurance, AROM, dynamic sitting balance to increase independence with ADL tasks and transfers/mobility. Comments: Patient cleared by nursing staff. Upon arrival pt supine in bed. Pt educated with regards to bed mobility, functional transfers, functional mobility, hand placement, walker safety, static/dynamic sitting balance, LE dressing, ROM ex's, UE strength/endurance. At end of session pt returned to supine in bed with all lines and tubes intact, call light within reach. Overall, pt demonstrated decreased independence and safety during completion of ADL/functional transfers/mobility tasks.  Pt would benefit from continued skilled OT to increase safety and independence with completion of ADL/IADL tasks

## 2020-02-11 NOTE — PLAN OF CARE
Problem: Increased nutrient needs (NI-5.1)  Goal: Food and/or Nutrient Delivery  Description  Individualized approach for food/nutrient provision.   Outcome: Met This Shift
Problem: Risk for Impaired Skin Integrity  Goal: Tissue integrity - skin and mucous membranes  Description  Structural intactness and normal physiological function of skin and  mucous membranes.   2/11/2020 1553 by Dione Webster RN  Outcome: Met This Shift     Problem: Falls - Risk of:  Goal: Will remain free from falls  Description  Will remain free from falls  2/11/2020 1553 by Dione Webster RN  Outcome: Met This Shift     Problem: Falls - Risk of:  Goal: Absence of physical injury  Description  Absence of physical injury  2/11/2020 1553 by Dione Webster RN  Outcome: Met This Shift     Problem: Discharge Planning:  Goal: Discharged to appropriate level of care  Description  Discharged to appropriate level of care  2/11/2020 1553 by Dione Webster RN  Outcome: Met This Shift     Problem: Fluid Volume - Deficit:  Goal: Absence of fluid volume deficit signs and symptoms  Description  Absence of fluid volume deficit signs and symptoms  2/11/2020 1553 by Dione Webster RN  Outcome: Met This Shift     Problem: Nutrition Deficit:  Goal: Ability to achieve adequate nutritional intake will improve  Description  Ability to achieve adequate nutritional intake will improve  2/11/2020 1553 by Dione Webster RN  Outcome: Met This Shift     Problem: Sleep Pattern Disturbance:  Goal: Appears well-rested  Description  Appears well-rested  2/11/2020 1553 by Dione Webster RN  Outcome: Met This Shift     Problem: Skin Integrity:  Goal: Will show no infection signs and symptoms  Description  Will show no infection signs and symptoms  2/11/2020 1553 by Dione Webster RN  Outcome: Met This Shift     Problem: Skin Integrity:  Goal: Absence of new skin breakdown  Description  Absence of new skin breakdown  2/11/2020 1553 by Dione Webster RN  Outcome: Met This Shift
Problem: Risk for Impaired Skin Integrity  Goal: Tissue integrity - skin and mucous membranes  Description  Structural intactness and normal physiological function of skin and  mucous membranes.   2/7/2020 0128 by Eddie Mosquera RN  Outcome: Met This Shift  2/6/2020 1913 by Jennifer Horowitz RN  Outcome: Met This Shift     Problem: Falls - Risk of:  Goal: Will remain free from falls  Description  Will remain free from falls  2/7/2020 0128 by Eddie Mosquera RN  Outcome: Met This Shift  2/6/2020 1913 by Jennifer Horowitz RN  Outcome: Met This Shift  Goal: Absence of physical injury  Description  Absence of physical injury  2/7/2020 0128 by Eddie Mosquera RN  Outcome: Met This Shift  2/6/2020 1913 by Jennifer Horowitz RN  Outcome: Met This Shift     Problem: Fluid Volume - Deficit:  Goal: Absence of fluid volume deficit signs and symptoms  Description  Absence of fluid volume deficit signs and symptoms  Outcome: Met This Shift     Problem: Nutrition Deficit:  Goal: Ability to achieve adequate nutritional intake will improve  Description  Ability to achieve adequate nutritional intake will improve  Outcome: Met This Shift     Problem: Skin Integrity:  Goal: Will show no infection signs and symptoms  Description  Will show no infection signs and symptoms  Outcome: Met This Shift  Goal: Absence of new skin breakdown  Description  Absence of new skin breakdown  Outcome: Met This Shift
Problem: Risk for Impaired Skin Integrity  Goal: Tissue integrity - skin and mucous membranes  Description  Structural intactness and normal physiological function of skin and  mucous membranes.   2/8/2020 1848 by Erin Penn RN  Outcome: Met This Shift     Problem: Falls - Risk of:  Goal: Will remain free from falls  Description  Will remain free from falls  2/8/2020 1848 by Erin Penn RN  Outcome: Met This Shift
Problem: Risk for Impaired Skin Integrity  Goal: Tissue integrity - skin and mucous membranes  Description  Structural intactness and normal physiological function of skin and  mucous membranes.   Outcome: Met This Shift     Problem: Falls - Risk of:  Goal: Will remain free from falls  Description  Will remain free from falls  Outcome: Met This Shift  Goal: Absence of physical injury  Description  Absence of physical injury  Outcome: Met This Shift     Problem: Discharge Planning:  Goal: Discharged to appropriate level of care  Description  Discharged to appropriate level of care  Outcome: Met This Shift     Problem: Fluid Volume - Deficit:  Goal: Absence of fluid volume deficit signs and symptoms  Description  Absence of fluid volume deficit signs and symptoms  Outcome: Met This Shift     Problem: Nutrition Deficit:  Goal: Ability to achieve adequate nutritional intake will improve  Description  Ability to achieve adequate nutritional intake will improve  Outcome: Met This Shift     Problem: Sleep Pattern Disturbance:  Goal: Appears well-rested  Description  Appears well-rested  Outcome: Met This Shift     Problem: Skin Integrity:  Goal: Will show no infection signs and symptoms  Description  Will show no infection signs and symptoms  Outcome: Met This Shift  Goal: Absence of new skin breakdown  Description  Absence of new skin breakdown  Outcome: Met This Shift
Problem: Risk for Impaired Skin Integrity  Goal: Tissue integrity - skin and mucous membranes  Description  Structural intactness and normal physiological function of skin and  mucous membranes.   Outcome: Met This Shift     Problem: Falls - Risk of:  Goal: Will remain free from falls  Description  Will remain free from falls  Outcome: Met This Shift  Goal: Absence of physical injury  Description  Absence of physical injury  Outcome: Met This Shift     Problem: Discharge Planning:  Goal: Discharged to appropriate level of care  Description  Discharged to appropriate level of care  Outcome: Met This Shift     Problem: Fluid Volume - Deficit:  Goal: Absence of fluid volume deficit signs and symptoms  Description  Absence of fluid volume deficit signs and symptoms  Outcome: Met This Shift     Problem: Nutrition Deficit:  Goal: Ability to achieve adequate nutritional intake will improve  Description  Ability to achieve adequate nutritional intake will improve  Outcome: Met This Shift     Problem: Sleep Pattern Disturbance:  Goal: Appears well-rested  Description  Appears well-rested  Outcome: Met This Shift     Problem: Skin Integrity:  Goal: Will show no infection signs and symptoms  Description  Will show no infection signs and symptoms  Outcome: Met This Shift  Goal: Absence of new skin breakdown  Description  Absence of new skin breakdown  Outcome: Met This Shift
Problem: Risk for Impaired Skin Integrity  Goal: Tissue integrity - skin and mucous membranes  Description  Structural intactness and normal physiological function of skin and  mucous membranes.   Outcome: Met This Shift     Problem: Falls - Risk of:  Goal: Will remain free from falls  Description  Will remain free from falls  Outcome: Met This Shift  Goal: Absence of physical injury  Description  Absence of physical injury  Outcome: Met This Shift     Problem: Fluid Volume - Deficit:  Goal: Absence of fluid volume deficit signs and symptoms  Description  Absence of fluid volume deficit signs and symptoms  Outcome: Met This Shift     Problem: Sleep Pattern Disturbance:  Goal: Appears well-rested  Description  Appears well-rested  Outcome: Met This Shift     Problem: Skin Integrity:  Goal: Will show no infection signs and symptoms  Description  Will show no infection signs and symptoms  Outcome: Met This Shift  Goal: Absence of new skin breakdown  Description  Absence of new skin breakdown  Outcome: Met This Shift     Problem: Nutrition Deficit:  Goal: Ability to achieve adequate nutritional intake will improve  Description  Ability to achieve adequate nutritional intake will improve  Outcome: Not Met This Shift
level of care  Outcome: Not Met This Shift     Problem: Sleep Pattern Disturbance:  Goal: Appears well-rested  Description  Appears well-rested  Outcome: Not Met This Shift     Problem: Skin Integrity:  Goal: Will show no infection signs and symptoms  Description  Will show no infection signs and symptoms  2/7/2020 0830 by Terese Snyder, RN  Outcome: Not Met This Shift  2/7/2020 0128 by Rachana Jiménez RN  Outcome: Met This Shift

## 2020-02-11 NOTE — PROGRESS NOTES
Attempted to call nurse to nurse to [facility] No nurse was able to answer my call. Message left with  that patient was be transported to their facility at 4:30pm and to have nurse call back for report.

## 2020-02-11 NOTE — DISCHARGE SUMMARY
Results   Component Value Date    TRIG 116 06/06/2019    TRIG 132 10/17/2015    TRIG 168 (H) 11/23/2012     Lab Results   Component Value Date    HDL 38 06/06/2019    HDL 39 03/27/2019    HDL 35 10/17/2015     Lab Results   Component Value Date    LDLCALC 160 (H) 06/06/2019    LDLCALC 168 (H) 03/27/2019    LDLCALC 210 (H) 10/17/2015     Lab Results   Component Value Date    LABA1C 4.6 06/06/2019       IMAGING:  Xr Chest Standard (2 Vw)    Result Date: 2/3/2020  Reading location: 200 Indication: Fatigue Comparison: Prior chest radiograph from 7/18/2013 Technique: Chest PA and lateral radiographs were obtained. Findings: The cardiomediastinal silhouette is stable in size and contours. Bilateral lungs and costophrenic angles are clear. There is no evidence of pneumothorax. No acute cardiopulmonary disease. Xr Chest Portable    Result Date: 2/8/2020  Reading location: 200 Indication: Shortness of breath Comparison: Prior chest radiograph from 2/7/2020 Technique: Portable AP upright chest radiograph was obtained. Findings: The patient is rotated to the right. The cardiomediastinal silhouette is stable in size and contours. There is a new small right pleural effusion with right basilar airspace disease. There is questionable left perihilar airspace disease. There is no evidence of pneumothorax. 1. New small right pleural effusion with right basilar airspace disease. 2. Questionable left perihilar airspace disease. Xr Chest Portable    Result Date: 2/7/2020  LOCATION: 200 EXAM: XR CHEST PORTABLE COMPARISON: 2/6/2020 HISTORY: Shortness of breath TECHNIQUE: Single frontal view of the chest was obtained. FINDINGS:  SUPPORT DEVICES: None. LUNGS: Clear with no areas of consolidation. PLEURA: No pneumothorax visualized. LUNG VOLUMES: Satisfactory inspirator effort. MEDIASTINAL STRUCTURES: No lymphadenopathy. Normal aortic contour. HEART SIZE: Normal. BONES AND SOFT TISSUES: No fracture or soft tissue abnormality.

## 2020-02-11 NOTE — CARE COORDINATION
SOCIAL WORK / DISCHARGE PLANNIN [address]  obtained today for [facility] of [address] [facility], [address] [facility], fax [phone], skilled. Transport arranged via [facility] 330 pm. Ambulance form completed. Jerry Davey RN aware. Electronic JOHNNA in pt's EPIC Chart for physician signature. HENs form completed.                  Electronically signed by Posey Kawasaki, LSW on 2020 at 1:29 PM

## 2020-02-11 NOTE — PROGRESS NOTES
No number assigned to pain/10. FUNCTIONAL ASSESSMENT   Bed Mobility- Supine to sit- Moderate assist of 2         Scooting- Moderate assist of 2       Sit to supine- Moderate assist of 2     Transfers-Sit to stand- Minimal assist of 2    Gait: Patient ambulated 20 feet x 2 using a wide wheeled walker with Minimal assist. Comment: Verbal cues were needed for safety, upright posture, walker management, and obstacle negotiation. Steps: Not assessed    Treatment: Patient was educated and facilitated on techniques to increase safety and independence with bed mobility, balance, functional transfers, and functional mobility. Patient was explained the the benefits of mobility and risks of immobility. Patient transferred to side of bed and sat up x 8 minutes to increase dynamic sitting balance and activity tolerance. Patient stood, ambulated to the door and back to the edge of the bed. Patient performed below exercises. Patient was returned to bed at end of treatment. Patient required maximal assistance of 2 to scoot up in bed and head of bed was elevated. Exercises: ankle pumps, hip abduction/adduction and long arc quad AAROM x 10 reps. Verbal cues were given for correct technique and to perform 2-3x daily. At end of session, patient in bed with  call light and phone within reach,  all lines and tubes intact, nursing notified. Nursing present     Patient continues to benefit from skilled PT to improve functional independence and quality of life. A:  Patient able to progress with increased distance but needing minimal assist for obstacle negotiation and walker management. Patient is at risk of falls due to decreased strength and endurance.     Time in: 10:15  Time out: 10:43    CPT codes:  Therapeutic activities (59424)   18 minutes  1 unit(s)  Therapeutic exercises (73539)   10 minutes  1 unit(s)      ERMELINDA BARAJAS, PTA

## 2020-03-02 ENCOUNTER — TELEPHONE (OUTPATIENT)
Dept: INTERNAL MEDICINE CLINIC | Age: 45
End: 2020-03-02

## 2020-03-04 ENCOUNTER — TELEPHONE (OUTPATIENT)
Dept: NEUROLOGY | Age: 45
End: 2020-03-04

## 2020-03-04 NOTE — LETTER
[facility] ([facility] Muscular Dystrophy/Multiple Sclerosis  [address]  Phone: [phone]  Fax: [phone]    Salvador Baca MD        March 4, 2020     [address]      Dear Irving Miller: We are sorry that you missed your appointment with Dr. Js Fiore on 3/4/2020. Your health and follow-up medical care are important to us. Please call our office as soon as possible so that we may reschedule your appointment. If you have already rescheduled your appointment, please disregard this letter.     Sincerely,      TORI Fernando on behalf of:  Salvador Baca MD

## 2020-03-04 NOTE — TELEPHONE ENCOUNTER
Patient no showed 03/04/2020 withDr. Khalida TREJO. Attempted to contact patient to reschedule the appointment, left message. No show letter sent.

## 2020-03-04 NOTE — TELEPHONE ENCOUNTER
THe pt's mother was called, condolences offered. She had specific questions I was unable to answer regarding cause of death.

## 2021-10-05 NOTE — PATIENT INSTRUCTIONS
Due to your asthma, rather than adding a higher dose of the metoprolol, which could potentially worsen your asthma, a new blood pressure medication has been added. Additionally, Singulair should help with your asthma symptoms but is in pill form rather than an inhaler. It should be taken nightly. Patient Education        Learning About Leukotriene Modifiers  Introduction    Leukotriene (say \"loo-koh-TRY-een\") modifiers are drugs used for asthma and hay fever. They treat asthma symptoms in adults. And in children, they improve how well the lungs work. They are not used to treat asthma attacks. They also reduce symptoms of hay fever. These include sneezing and a runny or stuffy nose. Examples  · Montelukast (Singulair)  · Zafirlukast (Accolate)  · Zileuton (Zyflo)  Side effects  · Vomiting. · Diarrhea. · A headache. You may have other side effects or reactions. Check the information that comes with your medicine. What to know about taking these medicines  · These drugs may help if your asthma is caused by exercise, aspirin, or allergies. · You may need to have blood tests during the first 6 months of treatment. The tests check for liver damage. · Take your medicines exactly as prescribed. Call your doctor if you think you are having a problem with your medicine. · Check with your doctor or pharmacist before you use any other medicines. This includes over-the-counter drugs. Make sure your doctor knows all of the medicines you take. This includes vitamins, herbs, and supplements. Taking some drugs together can cause problems. Where can you learn more? Go to [email or web address] org and sign in to your [facility] account. Enter Y310 in the [employer] box to learn more about \"Learning About Leukotriene Modifiers. \"     If you do not have an account, please click on the \"Sign Up Now\" link.   Current as of: September 5, 2018  Content Version: 12.0  © 7860-0925 Healthwise, Quality 111:Pneumonia Vaccination Status For Older Adults: Pneumococcal Vaccination Previously Received Quality 110: Preventive Care And Screening: Influenza Immunization: Influenza Immunization Ordered or Recommended, but not Administered due to system reason Detail Level: Detailed

## (undated) DEVICE — GAUZE,SPONGE,4"X4",16PLY,XRAY,STRL,LF: Brand: MEDLINE

## (undated) DEVICE — COVER,LIGHT HANDLE,FLX,1/PK: Brand: MEDLINE INDUSTRIES, INC.

## (undated) DEVICE — SKIN AFFIX SURG ADHESIVE 72/CS 0.55ML: Brand: MEDLINE

## (undated) DEVICE — CHLORAPREP 26ML ORANGE

## (undated) DEVICE — 6 X 9  1.75MIL 4-WALL LABGUARD: Brand: MINIGRIP COMMERCIAL LLC

## (undated) DEVICE — SET SURG INSTR DISSECT

## (undated) DEVICE — CONTAINER SPEC COLL 960ML POLYPR TRIANG GRAD INTAKE/OUTPUT

## (undated) DEVICE — DOUBLE BASIN SET: Brand: MEDLINE INDUSTRIES, INC.

## (undated) DEVICE — SPONGE GZ 4IN 4IN 4 PLY N WVN AVANT

## (undated) DEVICE — TUBING, SUCTION, 1/4" X 10', STRAIGHT: Brand: MEDLINE

## (undated) DEVICE — SYRINGE IRRIG 60ML SFT PLIABLE BLB EZ TO GRP 1 HND USE W/

## (undated) DEVICE — PENCIL ES L3M BTTN SWCH HOLSTER W/ BLDE ELECTRD EDGE

## (undated) DEVICE — YANKAUER,BULB TIP,W/O VENT,RIGID,STERILE: Brand: MEDLINE

## (undated) DEVICE — BLADE ES ELASTOMERIC COAT INSUL DURABLE BEND UPTO 90DEG

## (undated) DEVICE — PATIENT RETURN ELECTRODE, SINGLE-USE, CONTACT QUALITY MONITORING, ADULT, WITH 9FT CORD, FOR PATIENTS WEIGING OVER 33LBS. (15KG): Brand: MEGADYNE

## (undated) DEVICE — TOWEL,OR,DSP,ST,BLUE,STD,6/PK,12PK/CS: Brand: MEDLINE

## (undated) DEVICE — STANDARD HYPODERMIC NEEDLE,POLYPROPYLENE HUB: Brand: MONOJECT

## (undated) DEVICE — GOWN,SIRUS,FABRNF,XL,20/CS: Brand: MEDLINE

## (undated) DEVICE — NDL CNTR 40CT FM MAG: Brand: MEDLINE INDUSTRIES, INC.

## (undated) DEVICE — GLOVE ORANGE PI 7 1/2   MSG9075

## (undated) DEVICE — MASK,FACE,MAXFLUIDPROTECT,SHIELD/ERLPS: Brand: MEDLINE

## (undated) DEVICE — PACK,LAPAROTOMY,NO GOWNS: Brand: MEDLINE

## (undated) DEVICE — LUBRICANT SURG JELLY ST BACTER TUBE 4.25OZ

## (undated) DEVICE — GAUZE,SPONGE,4"X4",16PLY,STRL,LF,10/TRAY: Brand: MEDLINE

## (undated) DEVICE — PAD,NON-ADHERENT,3X8,STERILE,LF,1/PK: Brand: MEDLINE

## (undated) DEVICE — GOWN,SIRUS,NONRNF,SETINSLV,XL,20/CS: Brand: MEDLINE

## (undated) DEVICE — INTENDED FOR TISSUE SEPARATION, AND OTHER PROCEDURES THAT REQUIRE A SHARP SURGICAL BLADE TO PUNCTURE OR CUT.: Brand: BARD-PARKER ® STAINLESS STEEL BLADES

## (undated) DEVICE — KENDALL 450 SERIES MONITORING FOAM ELECTRODE - RECTANGULAR SHAPE ( 3/PK): Brand: KENDALL

## (undated) DEVICE — ELECTRODE NDL L2.8IN COAT LO PWR SET EDGE

## (undated) DEVICE — SOLUTION IV IRRIG 500ML 0.9% SODIUM CHL 2F7123

## (undated) DEVICE — BLOCK BITE 60FR CAREGUARD

## (undated) DEVICE — KIT BEDSIDE REVITAL OX 500ML

## (undated) DEVICE — Device: Brand: DEFENDO VALVE AND CONNECTOR KIT

## (undated) DEVICE — MARKER,SKIN,WI/RULER AND LABELS: Brand: MEDLINE

## (undated) DEVICE — GOWN ISOLATN REG YEL M WT MULTIPLY SIDETIE LEV 2

## (undated) DEVICE — FORCEPS BX L240CM JAW DIA2.8MM L CAP W/ NDL MIC MESH TOOTH